# Patient Record
Sex: MALE | Race: ASIAN | NOT HISPANIC OR LATINO | Employment: FULL TIME | URBAN - METROPOLITAN AREA
[De-identification: names, ages, dates, MRNs, and addresses within clinical notes are randomized per-mention and may not be internally consistent; named-entity substitution may affect disease eponyms.]

---

## 2021-08-11 ENCOUNTER — APPOINTMENT (OUTPATIENT)
Dept: RADIOLOGY | Facility: HOSPITAL | Age: 36
End: 2021-08-11
Attending: STUDENT IN AN ORGANIZED HEALTH CARE EDUCATION/TRAINING PROGRAM
Payer: COMMERCIAL

## 2021-08-11 ENCOUNTER — HOSPITAL ENCOUNTER (EMERGENCY)
Facility: HOSPITAL | Age: 36
Discharge: HOME | End: 2021-08-11
Attending: EMERGENCY MEDICINE | Admitting: EMERGENCY MEDICINE
Payer: COMMERCIAL

## 2021-08-11 VITALS
OXYGEN SATURATION: 96 % | TEMPERATURE: 97.6 F | SYSTOLIC BLOOD PRESSURE: 125 MMHG | DIASTOLIC BLOOD PRESSURE: 80 MMHG | RESPIRATION RATE: 18 BRPM | HEART RATE: 100 BPM | WEIGHT: 180 LBS

## 2021-08-11 DIAGNOSIS — K27.9 PUD (PEPTIC ULCER DISEASE): ICD-10-CM

## 2021-08-11 DIAGNOSIS — R10.11 RUQ ABDOMINAL PAIN: ICD-10-CM

## 2021-08-11 LAB
ALBUMIN SERPL-MCNC: 4 G/DL (ref 3.5–5)
ALP SERPL-CCNC: 67 U/L (ref 45–120)
ALT SERPL W P-5'-P-CCNC: 36 U/L (ref 0–45)
ANION GAP SERPL CALCULATED.3IONS-SCNC: 6 MMOL/L (ref 5–18)
AST SERPL W P-5'-P-CCNC: 22 U/L (ref 0–40)
BASOPHILS # BLD AUTO: 0.1 10E3/UL (ref 0–0.2)
BASOPHILS NFR BLD AUTO: 1 %
BILIRUB SERPL-MCNC: 0.6 MG/DL (ref 0–1)
BUN SERPL-MCNC: 10 MG/DL (ref 8–22)
CALCIUM SERPL-MCNC: 9.2 MG/DL (ref 8.5–10.5)
CHLORIDE BLD-SCNC: 106 MMOL/L (ref 98–107)
CO2 SERPL-SCNC: 28 MMOL/L (ref 22–31)
CREAT SERPL-MCNC: 0.83 MG/DL (ref 0.7–1.3)
EOSINOPHIL # BLD AUTO: 0.1 10E3/UL (ref 0–0.7)
EOSINOPHIL NFR BLD AUTO: 1 %
ERYTHROCYTE [DISTWIDTH] IN BLOOD BY AUTOMATED COUNT: 12.6 % (ref 10–15)
GFR SERPL CREATININE-BSD FRML MDRD: >90 ML/MIN/1.73M2
GLUCOSE BLD-MCNC: 97 MG/DL (ref 70–125)
HCT VFR BLD AUTO: 46.4 % (ref 40–53)
HGB BLD-MCNC: 15.6 G/DL (ref 13.3–17.7)
IMM GRANULOCYTES # BLD: 0 10E3/UL
IMM GRANULOCYTES NFR BLD: 1 %
LYMPHOCYTES # BLD AUTO: 1.9 10E3/UL (ref 0.8–5.3)
LYMPHOCYTES NFR BLD AUTO: 26 %
MCH RBC QN AUTO: 30 PG (ref 26.5–33)
MCHC RBC AUTO-ENTMCNC: 33.6 G/DL (ref 31.5–36.5)
MCV RBC AUTO: 89 FL (ref 78–100)
MONOCYTES # BLD AUTO: 0.6 10E3/UL (ref 0–1.3)
MONOCYTES NFR BLD AUTO: 9 %
NEUTROPHILS # BLD AUTO: 4.6 10E3/UL (ref 1.6–8.3)
NEUTROPHILS NFR BLD AUTO: 62 %
NRBC # BLD AUTO: 0 10E3/UL
NRBC BLD AUTO-RTO: 0 /100
PLATELET # BLD AUTO: 262 10E3/UL (ref 150–450)
POTASSIUM BLD-SCNC: 3.8 MMOL/L (ref 3.5–5)
PROT SERPL-MCNC: 7.2 G/DL (ref 6–8)
RBC # BLD AUTO: 5.2 10E6/UL (ref 4.4–5.9)
SODIUM SERPL-SCNC: 140 MMOL/L (ref 136–145)
WBC # BLD AUTO: 7.2 10E3/UL (ref 4–11)

## 2021-08-11 PROCEDURE — 71046 X-RAY EXAM CHEST 2 VIEWS: CPT

## 2021-08-11 PROCEDURE — 85025 COMPLETE CBC W/AUTO DIFF WBC: CPT | Performed by: EMERGENCY MEDICINE

## 2021-08-11 PROCEDURE — 250N000009 HC RX 250: Performed by: EMERGENCY MEDICINE

## 2021-08-11 PROCEDURE — 82247 BILIRUBIN TOTAL: CPT | Performed by: EMERGENCY MEDICINE

## 2021-08-11 PROCEDURE — 99284 EMERGENCY DEPT VISIT MOD MDM: CPT | Mod: 25

## 2021-08-11 PROCEDURE — 36415 COLL VENOUS BLD VENIPUNCTURE: CPT | Performed by: EMERGENCY MEDICINE

## 2021-08-11 PROCEDURE — 250N000013 HC RX MED GY IP 250 OP 250 PS 637: Performed by: EMERGENCY MEDICINE

## 2021-08-11 PROCEDURE — 82040 ASSAY OF SERUM ALBUMIN: CPT | Performed by: EMERGENCY MEDICINE

## 2021-08-11 RX ADMIN — LIDOCAINE HYDROCHLORIDE 30 ML: 20 SOLUTION ORAL; TOPICAL at 15:52

## 2021-08-11 ASSESSMENT — ENCOUNTER SYMPTOMS
BLOOD IN STOOL: 0
DIARRHEA: 0
CHILLS: 0
DIZZINESS: 0
VOMITING: 0
CONFUSION: 0
ABDOMINAL PAIN: 0
SORE THROAT: 0
HEMATURIA: 0
COUGH: 0
DYSURIA: 0
JOINT SWELLING: 0
DIFFICULTY URINATING: 0
NAUSEA: 0
FEVER: 0
SHORTNESS OF BREATH: 0

## 2021-08-11 NOTE — ED PROVIDER NOTES
Emergency Department Encounter     Evaluation Date & Time:   8/11/2021  3:17 PM    CHIEF COMPLAINT:  Rib Pain      Triage Note:R sided anterior lower rib pain x3-4 years, acutely worse and constant over the past 3 weeks. Denies cough, denies CP, Denies trauma, denies SOB, Denies fevers, denies NVD. No other complaints.         ED COURSE & MEDICAL DECISION MAKING:     ED Course as of Aug 11 1707   Wed Aug 11, 2021   1550 CXR (independent interpretation): no acute cardiopulmonary process       1559 CBC unremarkable.      1616 CMP unremarkable. Will speak with pt, reiterate PPI, [facility] follow up. Pt remains quite well here, appropriate for discharge, conservative empiric therapy.            3:36 PM I met with patient to gather history and perform initial exam. ED course and treatment plan was discussed.  Pt here with ongoing, intermittent RUQ/epigastric pain for the past 3 years, occuring 2-3 times a month, but now daily for the past 2-3 weeks. This is in the setting of recent ibuprofen use for left upper back pain (that has now resolved for few days).  Pt states abd pain is worse with an empty stomach, improves with eating.  Abdomen entirely benign, no associated other concerning symptoms and no bleeding/melena and well appearing.  Exam/history consistent with likely PUD.  Will get cmp, cbc, but no indication for imaging.  Will give a gi cocktail, anticipate discharge with PPI and [facility] follow up.  Pt agreeable.    4:20 PM I updated the patient with results and discussed plans for discharge. Patient was agreeable with the plan.    At the conclusion of the encounter I discussed the results of all the tests and the disposition. The questions were answered. The patient or family acknowledged understanding and was agreeable with the care plan.      MEDICATIONS GIVEN IN THE EMERGENCY DEPARTMENT:  Medications   lidocaine (XYLOCAINE) 2 % 15 mL, alum & mag hydroxide-simethicone (MAALOX) 15 mL GI Cocktail (30 mLs Oral Given  "8/11/21 1552)       NEW PRESCRIPTIONS STARTED AT TODAY'S ED VISIT:  Discharge Medication List as of 8/11/2021  4:26 PM      START taking these medications    Details   omeprazole (PRILOSEC) 20 MG DR capsule Take 1 capsule (20 mg) by mouth daily for 21 days, Disp-21 capsule, R-0, E-Prescribe             HPI   HPI     Jeromy Valdivia is a 35 year old male with no pertinent history who presents to this ED via walk in for evaluation of right rib pain.    Patient reports developing worsening \"constant\" right sided rib pain for 3 weeks that has been ongoing for 3 years with episodes that occur 1-2x a month. Also endorses constipation. Pain has no radiation. Says that eating and moving are palliating factors. Not eating provokes his pain. Has not seen any other provider. Notes he has been intaking more Ibuprofen due to his back pain with about 3 tablets each day. Denies nausea, vomiting, diarrhea, chest pain, shortness of breath, cough, difficulty urinating, black/bloody stools. No other complaints at this time.     REVIEW OF SYSTEMS:  Review of Systems   Constitutional: Negative for chills and fever.   HENT: Negative for sore throat.    Eyes: Negative for visual disturbance.   Respiratory: Negative for cough and shortness of breath.    Cardiovascular: Negative for chest pain.   Gastrointestinal: Negative for abdominal pain, blood in stool, diarrhea, nausea and vomiting.   Endocrine: Negative for polyuria.   Genitourinary: Negative for difficulty urinating, dysuria and hematuria.        - urinary changes     Musculoskeletal: Negative for joint swelling.        Positive for right rib pain.   Skin: Negative for rash.   Neurological: Negative for dizziness.   Psychiatric/Behavioral: Negative for confusion.   All other systems reviewed and are negative.          Medical History   No past medical history on file.    No past surgical history on file.    No family history on file.    Social History     Tobacco Use     Smoking status: " Not on file   Substance Use Topics     Alcohol use: Not on file     Drug use: Not on file       omeprazole (PRILOSEC) 20 MG DR capsule        Physical Exam     Triage Vitals:  ED Triage Vitals [08/11/21 1459]   Enc Vitals Group      /80      Pulse 100      Resp 18      Temp 97.6  F (36.4  C)      Temp src Temporal      SpO2 96 %      Weight 81.6 kg (180 lb)      Height       Head Circumference       Peak Flow       Pain Score       Pain Loc       Pain Edu?       Excl. in GC?         Vitals:  /80   Pulse 100   Temp 97.6  F (36.4  C) (Temporal)   Resp 18   Wt 81.6 kg (180 lb)   SpO2 96%     PHYSICAL EXAM:   Physical Exam  Vitals and nursing note reviewed.   Constitutional:       General: He is not in acute distress.     Appearance: Normal appearance.   HENT:      Head: Normocephalic and atraumatic.      Nose: Nose normal.      Mouth/Throat:      Mouth: Mucous membranes are moist.   Eyes:      Pupils: Pupils are equal, round, and reactive to light.   Cardiovascular:      Rate and Rhythm: Normal rate and regular rhythm.      Pulses: Normal pulses.           Radial pulses are 2+ on the right side and 2+ on the left side.        Dorsalis pedis pulses are 2+ on the right side and 2+ on the left side.   Pulmonary:      Effort: Pulmonary effort is normal. No respiratory distress.      Breath sounds: Normal breath sounds.   Chest:      Chest wall: No tenderness.   Abdominal:      General: There is no distension.      Palpations: Abdomen is soft.      Tenderness: There is no abdominal tenderness. Negative signs include Jacobo's sign and McBurney's sign.      Comments: No rigidity   Musculoskeletal:      Cervical back: Full passive range of motion without pain and neck supple.      Comments: No calf tenderness or swelling b/l   Skin:     General: Skin is warm.      Findings: No rash.   Neurological:      General: No focal deficit present.      Mental Status: He is alert. Mental status is at baseline.       Comments: Fluent speech, no acute lateralizing deficits   Psychiatric:         Mood and Affect: Mood normal.         Behavior: Behavior normal.           Results     LAB:  All pertinent labs reviewed and interpreted  Labs Ordered and Resulted from Time of ED Arrival Up to the Time of Departure from the ED   COMPREHENSIVE METABOLIC PANEL - Normal   CBC WITH PLATELETS & DIFFERENTIAL    Narrative:     The following orders were created for panel order CBC with platelets + differential.  Procedure                               Abnormality         Status                     ---------                               -----------         ------                     CBC with platelets and d...[[id number]]                      Final result                 Please view results for these tests on the individual orders.   CBC WITH PLATELETS AND DIFFERENTIAL       RADIOLOGY:  Chest XR,  PA & LAT   Final Result   IMPRESSION: No pleural fluid or pneumothorax. No airspace disease or edema. Normal size of the heart. No displaced fractures identified. CT or rib radiographs would be more sensitive.                    ECG:  none    PROCEDURES:  Procedures:  None            FINAL IMPRESSION:    ICD-10-CM    1. RUQ abdominal pain  R10.11    2. PUD (peptic ulcer disease)  K27.9     suspect       0 minutes of critical care time      I, Zakia Romero, am serving as a scribe to document services personally performed by Dr. Sammy Howard, based on my observations and the provider's statements to me. I, Sammy Howard, DO attest that Zakia Romero is acting in a scribe capacity, has observed my performance of the services and has documented them in accordance with my direction.      Sammy Howard DO  Emergency Medicine  [facility] EMERGENCY DEPARTMENT  8/11/2021  3:22 PM        Sammy Howard MD  08/11/21 1393

## 2021-08-11 NOTE — ED TRIAGE NOTES
R sided anterior lower rib pain x3-4 years, acutely worse and constant over the past 3 weeks. Denies cough, denies CP, Denies trauma, denies SOB, Denies fevers, denies NVD. No other complaints.

## 2021-08-11 NOTE — DISCHARGE INSTRUCTIONS
Take prilosec daily for the next 2 weeks at least and call GI doctor for follow up and further evaluation/care.  Prilosec needs to be taken every day to be effective and can take 2-3 days to start working.  For the first 2-3 days, you can try over the counter pepcid 20mg twice a day for those first 2-3 days.

## 2021-08-22 ENCOUNTER — HEALTH MAINTENANCE LETTER (OUTPATIENT)
Age: 36
End: 2021-08-22

## 2021-10-17 ENCOUNTER — HEALTH MAINTENANCE LETTER (OUTPATIENT)
Age: 36
End: 2021-10-17

## 2021-10-22 ENCOUNTER — HOSPITAL ENCOUNTER (OUTPATIENT)
Facility: HOSPITAL | Age: 36
Setting detail: OBSERVATION
Discharge: HOME HEALTH | End: 2021-10-24
Attending: EMERGENCY MEDICINE | Admitting: HOSPITALIST
Payer: COMMERCIAL

## 2021-10-22 ENCOUNTER — APPOINTMENT (OUTPATIENT)
Dept: MRI IMAGING | Facility: HOSPITAL | Age: 36
End: 2021-10-22
Payer: COMMERCIAL

## 2021-10-22 DIAGNOSIS — M54.50 ACUTE MIDLINE LOW BACK PAIN WITHOUT SCIATICA: Primary | ICD-10-CM

## 2021-10-22 DIAGNOSIS — R10.13 DYSPEPSIA: ICD-10-CM

## 2021-10-22 LAB — SARS-COV-2 RNA RESP QL NAA+PROBE: NEGATIVE

## 2021-10-22 PROCEDURE — 99220 PR INITIAL OBSERVATION CARE,LEVEL III: CPT | Performed by: HOSPITALIST

## 2021-10-22 PROCEDURE — 99285 EMERGENCY DEPT VISIT HI MDM: CPT | Mod: 25

## 2021-10-22 PROCEDURE — 250N000011 HC RX IP 250 OP 636: Performed by: PHYSICIAN ASSISTANT

## 2021-10-22 PROCEDURE — 72148 MRI LUMBAR SPINE W/O DYE: CPT

## 2021-10-22 PROCEDURE — 250N000011 HC RX IP 250 OP 636: Performed by: HOSPITALIST

## 2021-10-22 PROCEDURE — 87635 SARS-COV-2 COVID-19 AMP PRB: CPT | Performed by: EMERGENCY MEDICINE

## 2021-10-22 PROCEDURE — G0378 HOSPITAL OBSERVATION PER HR: HCPCS

## 2021-10-22 PROCEDURE — 96376 TX/PRO/DX INJ SAME DRUG ADON: CPT

## 2021-10-22 PROCEDURE — 96375 TX/PRO/DX INJ NEW DRUG ADDON: CPT

## 2021-10-22 PROCEDURE — 96374 THER/PROPH/DIAG INJ IV PUSH: CPT

## 2021-10-22 PROCEDURE — C9803 HOPD COVID-19 SPEC COLLECT: HCPCS

## 2021-10-22 PROCEDURE — 250N000011 HC RX IP 250 OP 636: Performed by: EMERGENCY MEDICINE

## 2021-10-22 PROCEDURE — 250N000013 HC RX MED GY IP 250 OP 250 PS 637: Performed by: HOSPITALIST

## 2021-10-22 RX ORDER — NALOXONE HYDROCHLORIDE 0.4 MG/ML
0.2 INJECTION, SOLUTION INTRAMUSCULAR; INTRAVENOUS; SUBCUTANEOUS
Status: DISCONTINUED | OUTPATIENT
Start: 2021-10-22 | End: 2021-10-24 | Stop reason: HOSPADM

## 2021-10-22 RX ORDER — ONDANSETRON 4 MG/1
4 TABLET, ORALLY DISINTEGRATING ORAL EVERY 6 HOURS PRN
Status: DISCONTINUED | OUTPATIENT
Start: 2021-10-22 | End: 2021-10-24 | Stop reason: HOSPADM

## 2021-10-22 RX ORDER — ORPHENADRINE CITRATE 30 MG/ML
60 INJECTION INTRAMUSCULAR; INTRAVENOUS ONCE
Status: COMPLETED | OUTPATIENT
Start: 2021-10-22 | End: 2021-10-22

## 2021-10-22 RX ORDER — AMOXICILLIN 250 MG
2 CAPSULE ORAL 2 TIMES DAILY PRN
Status: DISCONTINUED | OUTPATIENT
Start: 2021-10-22 | End: 2021-10-23

## 2021-10-22 RX ORDER — METHOCARBAMOL 500 MG/1
500 TABLET, FILM COATED ORAL 4 TIMES DAILY
Status: DISCONTINUED | OUTPATIENT
Start: 2021-10-22 | End: 2021-10-24 | Stop reason: HOSPADM

## 2021-10-22 RX ORDER — NALOXONE HYDROCHLORIDE 0.4 MG/ML
0.4 INJECTION, SOLUTION INTRAMUSCULAR; INTRAVENOUS; SUBCUTANEOUS
Status: DISCONTINUED | OUTPATIENT
Start: 2021-10-22 | End: 2021-10-24 | Stop reason: HOSPADM

## 2021-10-22 RX ORDER — ONDANSETRON 2 MG/ML
4 INJECTION INTRAMUSCULAR; INTRAVENOUS EVERY 6 HOURS PRN
Status: DISCONTINUED | OUTPATIENT
Start: 2021-10-22 | End: 2021-10-24 | Stop reason: HOSPADM

## 2021-10-22 RX ORDER — KETOROLAC TROMETHAMINE 30 MG/ML
30 INJECTION, SOLUTION INTRAMUSCULAR; INTRAVENOUS EVERY 6 HOURS PRN
Status: DISCONTINUED | OUTPATIENT
Start: 2021-10-22 | End: 2021-10-24 | Stop reason: HOSPADM

## 2021-10-22 RX ORDER — AMOXICILLIN 250 MG
1 CAPSULE ORAL 2 TIMES DAILY PRN
Status: DISCONTINUED | OUTPATIENT
Start: 2021-10-22 | End: 2021-10-23

## 2021-10-22 RX ORDER — ACETAMINOPHEN 325 MG/1
975 TABLET ORAL EVERY 8 HOURS
Status: DISCONTINUED | OUTPATIENT
Start: 2021-10-22 | End: 2021-10-24 | Stop reason: HOSPADM

## 2021-10-22 RX ORDER — ALLOPURINOL 100 MG/1
100 TABLET ORAL DAILY
COMMUNITY
Start: 2020-11-19 | End: 2022-01-27

## 2021-10-22 RX ORDER — DIAZEPAM 10 MG/2ML
5 INJECTION, SOLUTION INTRAMUSCULAR; INTRAVENOUS ONCE
Status: COMPLETED | OUTPATIENT
Start: 2021-10-22 | End: 2021-10-22

## 2021-10-22 RX ORDER — HYDROMORPHONE HYDROCHLORIDE 2 MG/1
2 TABLET ORAL EVERY 4 HOURS
Status: DISCONTINUED | OUTPATIENT
Start: 2021-10-22 | End: 2021-10-22

## 2021-10-22 RX ORDER — BACLOFEN 10 MG/1
10 TABLET ORAL 2 TIMES DAILY PRN
Status: ON HOLD | COMMUNITY
Start: 2020-07-16 | End: 2021-10-24

## 2021-10-22 RX ORDER — KETOROLAC TROMETHAMINE 15 MG/ML
15 INJECTION, SOLUTION INTRAMUSCULAR; INTRAVENOUS ONCE
Status: COMPLETED | OUTPATIENT
Start: 2021-10-22 | End: 2021-10-22

## 2021-10-22 RX ADMIN — HYDROMORPHONE HYDROCHLORIDE 1 MG: 1 INJECTION, SOLUTION INTRAMUSCULAR; INTRAVENOUS; SUBCUTANEOUS at 20:31

## 2021-10-22 RX ADMIN — ORPHENADRINE CITRATE 60 MG: 30 INJECTION INTRAMUSCULAR; INTRAVENOUS at 13:40

## 2021-10-22 RX ADMIN — HYDROMORPHONE HYDROCHLORIDE 1 MG: 1 INJECTION, SOLUTION INTRAMUSCULAR; INTRAVENOUS; SUBCUTANEOUS at 13:05

## 2021-10-22 RX ADMIN — HYDROMORPHONE HYDROCHLORIDE 1 MG: 1 INJECTION, SOLUTION INTRAMUSCULAR; INTRAVENOUS; SUBCUTANEOUS at 15:37

## 2021-10-22 RX ADMIN — KETOROLAC TROMETHAMINE 15 MG: 15 INJECTION, SOLUTION INTRAMUSCULAR; INTRAVENOUS at 16:45

## 2021-10-22 RX ADMIN — HYDROMORPHONE HYDROCHLORIDE 1 MG: 1 INJECTION, SOLUTION INTRAMUSCULAR; INTRAVENOUS; SUBCUTANEOUS at 13:40

## 2021-10-22 RX ADMIN — ACETAMINOPHEN 975 MG: 325 TABLET ORAL at 20:32

## 2021-10-22 RX ADMIN — METHOCARBAMOL 500 MG: 500 TABLET, FILM COATED ORAL at 22:46

## 2021-10-22 RX ADMIN — DIAZEPAM 5 MG: 5 INJECTION, SOLUTION INTRAMUSCULAR; INTRAVENOUS at 15:38

## 2021-10-22 ASSESSMENT — ENCOUNTER SYMPTOMS
CHILLS: 0
ABDOMINAL PAIN: 0
WEAKNESS: 0
DIARRHEA: 0
VOMITING: 0
FEVER: 0
DIAPHORESIS: 0
NAUSEA: 0
NUMBNESS: 0
BACK PAIN: 1

## 2021-10-22 ASSESSMENT — MIFFLIN-ST. JEOR: SCORE: 1620.72

## 2021-10-22 ASSESSMENT — ACTIVITIES OF DAILY LIVING (ADL): DEPENDENT_IADLS:: INDEPENDENT

## 2021-10-22 NOTE — ED PROVIDER NOTES
EMERGENCY DEPARTMENT ENCOUNTER      NAME: Jeromy Valdivia  AGE: 35 year old male  YOB: 1985  MRN: [id number]  EVALUATION DATE & TIME: 10/22/2021 12:24 PM    PCP: System, Provider Not In    ED PROVIDER: Liliana Contreras PA-C      Chief Complaint   Patient presents with     Back Pain       FINAL IMPRESSION:  1. Acute midline low back pain without sciatica          MEDICAL DECISION MAKING:    Pertinent Labs & Imaging studies reviewed. (See chart for details)  35 year old male presents to the Emergency Department for evaluation of atraumatic severe low back pain. Woke up with mild low back pain which acutely worsened after standing up from toilet. Pain intermittent with contraction like muscle spasms that causes him to not be able to walk. He denies fevers, chills, night sweats, saddle anesthesia, bowel/bladder dysfunction, leg weakness or numbness. Pain does not radiate into his legs. He notes tingling in his hands and feet when pain is severe.     Exam reveals uncomfortable appearing male.  Vital signs unremarkable. He has 5 out of 5 strength of upper and lower extremities, bilaterally. Distal sensation intact. No reproducible midline or paraspinal tenderness though grabbing at his lower back. No overlying skin changes. Differential diagnosis includes muscle spasm/strain, slipped disc cauda equina syndrome, vertebral fracture, vertebral tumor, epidural abscess / discitis.    Acute, severe low back pain. The patient appears nontoxic with stable vital signs. Overall, the patient has a benign physical exam with no focal neuro deficits.  He denies any saddle anesthesia, bowel or bladder incontinence, history of immunocompromise, fever or night sweats, spinal instrumentation, or other red flags.  He has no bony tenderness or recent falls or trauma.  Given severity of pain despite pain medication and muscle relaxants, will proceed with MRI lumbar spine. Patient signed out to Dr. Early at scheduled shift change pending  "imaging study.    0 minutes of critical care time     ED COURSE:  12:35 PM I met with the patient, obtained history, performed an initial exam, and discussed options and plan for diagnostics and treatment here in the ED.  2:23 PM Patient signed out to Dr. Early pending MR lumbar spine.     The questions were answered. The patient and family acknowledged understanding and were agreeable with the care plan.     PPE: Provider wore gloves, N95 mask, eye protection, surgical cap, and paper mask.     MEDICATIONS GIVEN IN THE EMERGENCY:  Medications   HYDROmorphone (DILAUDID) injection 1 mg (1 mg Intravenous Given 10/22/21 1305)   HYDROmorphone (DILAUDID) injection 1 mg (1 mg Intravenous Given 10/22/21 1340)   orphenadrine (NORFLEX) injection 60 mg (60 mg Intravenous Given 10/22/21 1340)       NEW PRESCRIPTIONS STARTED AT TODAY'S ER VISIT  New Prescriptions    No medications on file     =================================================================    HPI:    Patient information was obtained from: Patient    Use of Interpretor: N/A    Jeromy Valdivia is a 35 year old male with no pertinent medical history who presents to this ED via walk-in for evaluation of back pain.     The patient reports waking up with localized mild lower back pain this morning. Patient went to work where his pain began to increase. Patient used the bathroom to have a bowel movement and once he got up off of the toilet, he felt a sharp spasm in the center of his lower back that releases and contracts intermittently. He notes that any movement can cause the spasm. He rates his pain \"off the chart\". Also notes some tingling in his legs and hands. Patient took a muscle relaxant prior to arrival. Patient denies fever, chills, diaphoresis, abdominal pain, numbness, weakness, bowel or bladder dysfunction, saddle anesthesia, and any other symptoms or complaints at this time.    REVIEW OF SYSTEMS:  Review of Systems   Constitutional: Negative for chills, " diaphoresis and fever.   Gastrointestinal: Negative for abdominal pain, diarrhea, nausea and vomiting.   Musculoskeletal: Positive for back pain (mid lower back) and gait problem (secondary to back pain).   Skin: Negative for rash.   Allergic/Immunologic: Negative for immunocompromised state.   Neurological: Negative for weakness and numbness.        Positive for tingling (bilateral legs, bilateral hands). Negative for bowel or bladder dysfunction   All other systems reviewed and are negative.      PAST MEDICAL HISTORY:  No past medical history on file.    PAST SURGICAL HISTORY:  No past surgical history on file.    CURRENT MEDICATIONS:    No current facility-administered medications for this encounter.  No current outpatient medications on file.    ALLERGIES:  Allergies   Allergen Reactions     Flagyl [Metronidazole] Hives     FAMILY HISTORY:  No family history on file.    SOCIAL HISTORY:   Social History     Socioeconomic History     Marital status: Single     Spouse name: Not on file     Number of children: Not on file     Years of education: Not on file     Highest education level: Not on file   Occupational History     Not on file   Tobacco Use     Smoking status: Not on file   Substance and Sexual Activity     Alcohol use: Not on file     Drug use: Not on file     Sexual activity: Not on file   Other Topics Concern     Not on file   Social History Narrative     Not on file     Social Determinants of Health     Financial Resource Strain:      Difficulty of Paying Living Expenses:    Food Insecurity:      Worried About Running Out of Food in the Last Year:      Ran Out of Food in the Last Year:    Transportation Needs:      Lack of Transportation (Medical):      Lack of Transportation (Non-Medical):    Physical Activity:      Days of Exercise per Week:      Minutes of Exercise per Session:    Stress:      Feeling of Stress :    Social Connections:      Frequency of Communication with Friends and Family:       "Frequency of Social Gatherings with Friends and Family:      Attends Muslim Services:      Active Member of Clubs or Organizations:      Attends Club or Organization Meetings:      Marital Status:    Intimate Partner Violence:      Fear of Current or Ex-Partner:      Emotionally Abused:      Physically Abused:      Sexually Abused:        VITALS:  Patient Vitals for the past 24 hrs:   BP Temp Temp src Pulse Resp SpO2 Height Weight   10/22/21 1300 110/70 -- -- 92 16 98 % -- --   10/22/21 1137 121/73 98.3  F (36.8  C) Oral 89 20 99 % 1.575 m (5' 2\") 81.6 kg (180 lb)       PHYSICAL EXAM   Constitutional: Well developed, Obese. Uncomfortable appearing.   HENT: Normocephalic, Atraumatic, mucous membranes moist  Neck-  Normal range of motion, No tenderness, Supple, No stridor.   Eyes: PERRL, Conjunctiva normal, No discharge.   Respiratory: Normal breath sounds, No respiratory distress, No wheezing, Speaks full sentences easily. No cough.  Cardiovascular: Normal heart rate, Regular rhythm, No murmurs, No rubs, No gallops. Chest wall nontender.  GI: Soft, No tenderness, No masses, No flank tenderness. No rebound or guarding.  Musculoskeletal: 2+ DP pulses bilaterally. No edema. No cyanosis, No clubbing. Good range of motion in all major joints. Lumbar flexion and extension WNL. Straight leg raise negative bilaterally. No midline or paraspinal tenderness. No overlying skin changes. 5/5 strength for the bilateral hip flexors, knee flexors/extensors, ankle dorsiflexors/plantar flexors, great toe extensors, ankle evertors/invertors.  Integument: Warm, Dry, No erythema, No rash. No petechiae.  Neurologic: Patient is alert and oriented ×3. Face is symmetric. Speech is normal. Strength is full and equal in both upper and lower extremities. Sensory is intact. Patellar and achilles reflexes are 2+ and symmetric. No ankle clonus bilaterally.  Psychiatric: Affect normal, Judgment normal, Mood normal. " Cooperative.    RADIOLOGY:  Reviewed all pertinent imaging. Please see official radiology report.  Lumbar spine MRI w/o contrast    (Results Pending)       I, Deana Chaparro, am serving as a scribe to document services personally performed by Liliana Contreras PA-C based on my observation and the provider's statements to me. I, Liliana Contreras PA-C attest that Deana Chaparro is acting in a scribe capacity, has observed my performance of the services and has documented them in accordance with my direction.    Liliana Contreras PA-C  Emergency Medicine  [facility]  10/22/2021      Liliana Contreras PA-C  10/22/21 1722

## 2021-10-22 NOTE — H&P
.  [facility]    History and Physical - Hospitalist Service       Date of Admission:  10/22/2021    Assessment & Plan      Jeromy Valdivia is a 35 year old male admitted on 10/22/2021. He has no history of PMH and comes in with back pain    Acute back pain - unclear etiology   -Sudden onset of back pain today, no injuries reported.  -MRI no acute findings.  -Check UA ?  -Start scheduled tylenol 1000mg TID, IV dilaudid 1 mg Q4H  -Toradol 30mg X3 doses and robaxin ordered  -PT/OT consulted      Diet: Regular Diet AdultRegular diet  DVT Prophylaxis: Ambulate every shift  Downing Catheter: Not present  Central Lines: None  Code Status: Full CodeFull code    Disposition Plan   Expected discharge: <2 midnight for evaluation of back pain    The patient's care was discussed with the Bedside Nurse and Patient.    Lisa Gunn MD  [facility]  ______________________________________________________________________    Chief Complaint   Acute back pain    History is obtained from the patient    History of Present Illness   Jeromy Valdivia is a 35 year old male with no past medical history presented with back pain that started today.  Patient reports pain is in the lower back, rates 10 on 10 in severity.  Describes it as dull aching.  Associated with severe muscle spasms.  It is nonradiating.  Patient denies any fever, chest pain, shortness of breath, abdominal pain, nausea, vomiting, dysuria or polyuria, diarrhea or constipation.  No bowel or bladder incontinence.  No weakness, tingling or numbness.  No recent travel or sick contact.  No known exposures to Covid.  No history of any injuries to back.    In ER, patient appears hemodynamically stable.  Labs are all unremarkable.  MRI of the lumbar spine shows no acute findings to suggest the etiology for the back pain.  Patient's pain is uncontrollable and hence will be admitted on observation overnight for pain control.    Review of Systems     The 10 point Review of Systems is negative other than noted in the HPI or here.     Past Medical History    I have reviewed this patient's medical history and updated it with pertinent information if needed.   No past medical history on file.    Past Surgical History   I have reviewed this patient's surgical history and updated it with pertinent information if needed.  No past surgical history on file.    Social History   I have reviewed this patient's social history and updated it with pertinent information if needed.  Social History     Tobacco Use     Smoking status: Not on file   Substance Use Topics     Alcohol use: Not on file     Drug use: Not on file       Family History   Reviewed and non contributory    Prior to Admission Medications   Prior to Admission Medications   Prescriptions Last Dose Informant Patient Reported? Taking?   allopurinol (ZYLOPRIM) 100 MG tablet 10/21/2021 at Unknown time  Yes Yes   Sig: Take 100 mg by mouth daily   baclofen (LIORESAL) 10 MG tablet 10/22/2021 at am  Yes Yes   Sig: Take 10 mg by mouth 2 times daily as needed      Facility-Administered Medications: None     Allergies   Allergies   Allergen Reactions     Flagyl [Metronidazole] Hives       Physical Exam   Vital Signs: Temp: 98.4  F (36.9  C) Temp src: Oral BP: 120/73 Pulse: 80   Resp: 16 SpO2: 98 % O2 Device: Nasal cannula Oxygen Delivery: 3 LPM  Weight: 180 lbs 0 oz  General:  Appears stated age, no acute distress. A&O x 3.  Skin:  Warm, dry. No rashes or lesions on exposed skin.  HEENT:  Normocephalic, atraumatic; EOMs grossly intact.  Neck:  Supple.  Chest:  Breath sounds CTA and no increased work of breathing on room air.  Cardiovascular:  RRR. No peripheral edema.  Abdomen:  Soft, non-tender, non-distended.  Musculoskeletal:  Moves all four extremities. No muscle atrophy. Paraspinal muscle tenderness+.   Neurological:  CN 2-12 grossly intact.    Data   Data reviewed today: I reviewed all medications, new labs and  imaging results over the last 24 hours. I personally reviewed labs and MRI    No lab results found in last 7 days.    Imaging:  Recent Results (from the past 24 hour(s))   Lumbar spine MRI w/o contrast    Narrative    EXAM: MR LUMBAR SPINE W/O CONTRAST  LOCATION: [facility]  DATE/TIME: 10/22/2021 2:22 PM    INDICATION: Severe low back pain.  COMPARISON: None.  TECHNIQUE: Routine Lumbar Spine MRI without IV contrast.    FINDINGS:   Nomenclature is based on five lumbar-type vertebral bodies.    Vertebral body heights are unremarkable. There is no evidence of a marrow-replacing process. There is no evidence of abnormal bony edema. The imaged portions of the proximal femora, sacrum and gabe appear intact.    There is mild dextroconvex curvature. Lumbar lordosis is unremarkable.    The conus terminates at L1. No signal abnormalities of the visualized cord or cauda equina nerve roots are demonstrated.    The paraspinal soft tissues are unremarkable.    Limited images of the abdominal cavity demonstrate no acute abnormality. There is no evidence of abdominal aortic aneurysm.    T12-L1: Normal disc height and signal. No herniation. Normal facets. No spinal canal or neural foraminal stenosis.     L1-L2: Normal disc height and signal. No herniation. Normal facets. No spinal canal or neural foraminal stenosis.    L2-L3: Normal disc height and signal. No herniation. Normal facets. No spinal canal or neural foraminal stenosis.     L3-L4: Mild intervertebral disc height loss with disc desiccation. Shallow bulge and right paracentral protrusion. No significant spinal canal stenosis. Mild right lateral recess effacement. Mild facet arthropathy. No significant neural foraminal   stenosis.    L4-L5: Mild intervertebral disc height loss with disc desiccation. Shallow bulge and central/left paracentral extrusion. Mild facet arthropathy. No significant spinal canal stenosis. No significant neural foraminal  stenosis.    L5-S1: Mild intervertebral disc height loss with disc desiccation. Broad-based disc bulging with right-sided protrusion abutting the descending S1 nerve root. No spinal canal stenosis or neural foraminal stenosis.      Impression    IMPRESSION:  1.  No findings to suggest acute fracture.  2.  No high-grade stenosis of the spinal canal or neural foramina demonstrated.  3.  At L5-S1, a right-sided protrusion contacts the descending right S1 nerve root.  4.  At L3-L4, there is mild right lateral recess stenosis.  5.  Additional mild lower lumbar spondylosis as above.

## 2021-10-22 NOTE — ED TRIAGE NOTES
Pt in recliner chair in [facility].  Reports not able to get up due to pain.  Reports woke up today with some pian in low back from chronic injury more than 1 yr ago.  States went to work and about 0900 went to have a BM and started having sharp pain in low back that radiates bilat.  Denies current injury.  Reports feels better when laying down.  Took baclofen and tylenol about 0830 today.

## 2021-10-22 NOTE — PHARMACY-ADMISSION MEDICATION HISTORY
Pharmacy Note - Admission Medication History    Pertinent Provider Information: None     ______________________________________________________________________    Prior To Admission (PTA) med list completed and updated in EMR.       PTA Med List   Medication Sig Last Dose     allopurinol (ZYLOPRIM) 100 MG tablet Take 100 mg by mouth daily 10/21/2021 at Unknown time     baclofen (LIORESAL) 10 MG tablet Take 10 mg by mouth 2 times daily as needed 10/22/2021 at am       Information source(s): Patient, Family member and CareEverywhere/SureScripts  Method of interview communication: in-person    Summary of Changes to PTA Med List  New: Both  Discontinued: None  Changed: None    Patient was asked about OTC/herbal products specifically.  PTA med list reflects this.    In the past week, patient estimated taking medication this percent of the time:  greater than 90%.    Allergies were reviewed, assessed, and updated with the patient.      Patient does not use any multi-dose medications prior to admission.    The information provided in this note is only as accurate as the sources available at the time of the update(s).    Thank you for the opportunity to participate in the care of this patient.    Alison Kwong [facility]  10/22/2021 6:09 PM

## 2021-10-22 NOTE — ED PROVIDER NOTES
"I am seeing this patient along with Liliana Contreras, PAC.    HPI:  Jeromy Valdivia is a 35 year old male who presents to this ED for the evaluation of back pain. The patient reports onset of localized mild lower back pain this morning when he woke up which grew worse after going to work. Patient had a sharp spasm in his central middle back after he got up from having a bowel movement. Pain persistent since onset and states it constricts and releases. He currently rates his pain as 9/10. Pain provoked by movement, particularly movement of his legs. Patient took muscle relaxer prior to arrival.    Denies dysuria, hematuria, urinary or bowel incontinence, chest pain, shortness of breath, cough, congestion, sore throat, fever, or any other symptoms at this time.    ROS:   Constitutional:  Denies fever, chills, weight loss or weakness  Eyes:  No pain, discharge, redness   HENT:  Denies sore throat, ear pain, congestion   Respiratory: No SOB, wheeze or cough  Cardiovascular:  No CP, palpitations  GI:  Denies abdominal pain, nausea, vomiting, diarrhea, incontinence  : Denies dysuria, hematuria, urinary incontinence  Musculoskeletal:  Positive for back pain (central lower, severe) Denies any new joint pain, swelling or loss of function.   Skin:  Denies rash, pallor   Neurologic:  Denies headache, focal weakness or sensory changes  Endocrine: Denies polyuria, denies difficulty with tolerating hot/cold temperatures  Lymph: Denies swollen nodes    All other systems negative unless noted in HPI.    Physical Exam:    VITAL SIGNS: /83   Pulse 89   Temp 98.3  F (36.8  C) (Oral)   Resp 16   Ht 1.575 m (5' 2\")   Wt 81.6 kg (180 lb)   SpO2 100%   BMI 32.92 kg/m      General Appearance: Well-appearing, well-nourished, no acute distress   Head:  Normocephalic, without obvious abnormality, atraumatic  Eyes:  PERRL, conjunctiva/corneas clear, EOM's intact,  ENT:  Lips, mucosa, and tongue normal, membranes are moist without " pallor  Neck:  Normal ROM, symmetrical, trachea midline    Cardio:  Regular rate and rhythm, no murmur, rub or gallop, 2+ pulses symmetric in all extremities  Pulm:  Clear to auscultation bilaterally, respirations unlabored,   Back:  Spasms with back pain with attempts to lift legs off of bed. ROM normal, no CVA tenderness, no spinal tenderness, no paraspinal tenderness   Abdomen:  Soft, non-tender, no rebound or guarding.  Musculoskeletal: Full ROM, no edema, no cyanosis, good ROM of major joints  Integument:  Warm, Dry, No erythema, No rash.    Neurologic:  Alert & oriented.  No focal deficits appreciated.  Ambulatory.  Psychiatric:  Affect normal, Judgment normal, Mood normal.      LABS  Results for orders placed or performed during the hospital encounter of 10/22/21 (from the past 24 hour(s))   Lumbar spine MRI w/o contrast    Narrative    EXAM: MR LUMBAR SPINE W/O CONTRAST  LOCATION: [facility]  DATE/TIME: 10/22/2021 2:22 PM    INDICATION: Severe low back pain.  COMPARISON: None.  TECHNIQUE: Routine Lumbar Spine MRI without IV contrast.    FINDINGS:   Nomenclature is based on five lumbar-type vertebral bodies.    Vertebral body heights are unremarkable. There is no evidence of a marrow-replacing process. There is no evidence of abnormal bony edema. The imaged portions of the proximal femora, sacrum and gabe appear intact.    There is mild dextroconvex curvature. Lumbar lordosis is unremarkable.    The conus terminates at L1. No signal abnormalities of the visualized cord or cauda equina nerve roots are demonstrated.    The paraspinal soft tissues are unremarkable.    Limited images of the abdominal cavity demonstrate no acute abnormality. There is no evidence of abdominal aortic aneurysm.    T12-L1: Normal disc height and signal. No herniation. Normal facets. No spinal canal or neural foraminal stenosis.     L1-L2: Normal disc height and signal. No herniation. Normal facets. No spinal  canal or neural foraminal stenosis.    L2-L3: Normal disc height and signal. No herniation. Normal facets. No spinal canal or neural foraminal stenosis.     L3-L4: Mild intervertebral disc height loss with disc desiccation. Shallow bulge and right paracentral protrusion. No significant spinal canal stenosis. Mild right lateral recess effacement. Mild facet arthropathy. No significant neural foraminal   stenosis.    L4-L5: Mild intervertebral disc height loss with disc desiccation. Shallow bulge and central/left paracentral extrusion. Mild facet arthropathy. No significant spinal canal stenosis. No significant neural foraminal stenosis.    L5-S1: Mild intervertebral disc height loss with disc desiccation. Broad-based disc bulging with right-sided protrusion abutting the descending S1 nerve root. No spinal canal stenosis or neural foraminal stenosis.      Impression    IMPRESSION:  1.  No findings to suggest acute fracture.  2.  No high-grade stenosis of the spinal canal or neural foramina demonstrated.  3.  At L5-S1, a right-sided protrusion contacts the descending right S1 nerve root.  4.  At L3-L4, there is mild right lateral recess stenosis.  5.  Additional mild lower lumbar spondylosis as above.         RADIOLOGY  Lumbar spine MRI w/o contrast   Final Result   IMPRESSION:   1.  No findings to suggest acute fracture.   2.  No high-grade stenosis of the spinal canal or neural foramina demonstrated.   3.  At L5-S1, a right-sided protrusion contacts the descending right S1 nerve root.   4.  At L3-L4, there is mild right lateral recess stenosis.   5.  Additional mild lower lumbar spondylosis as above.           ED COURSE & MEDICAL DECISION MAKING    Pertinent Labs and Imagaing reviewed (see chart for details)    35 year old male presented to emergency department with acute back pain.  Patient did not have reproducible pain on exam however any movement of his legs where he tries to lift them off the bed because of  severe back spasms.  Patient the patient's clinical presentation, an MRI was performed that did not show any evidence of obvious pathology that could easily explain his symptoms.  There is no evidence of surgical or other emergent pathology.  Unfortunately while in the emergency department, we have been unable to control the patient's pain despite Norflex, Toradol, Valium, and multiple doses of Dilaudid.  At this time, as the patient is not able to ambulate due to the severe pain, I will have to admit for pain control under observation status.    2:00 PM The patient was staffed with me.  3:08 PM I met with the patient, obtained history, performed an initial exam, and discussed options and plan for diagnostics and treatment here in the ED. PPE worn including N95 mask, surgical gloves.  3:29 PM I spoke with radiology who states they do not see any abnormalities with the psoas muscle in the MRI. Rechecked and updated the patient.  4:18 PM I rechecked and updated the patient. Even after valium, the patient cannot move his legs without having major spasms in his back.  5:46 PM I spoke with the hospitalist Dr. Gunn.    At the conclusion of the encounter I discussed  the results of all of the tests and the disposition.   The questions were answered.  The patient or family acknowledged understanding and was agreeable with the care plan.       FINAL IMPRESSION        1. Acute midline low back pain without sciatica            I, Raul Sherman, am serving as a scribe to document services personally performed by Sandoval Early DO, based on my observations and the provider's statements to me.  I, Sandoval Early DO, attest that Raul Sherman is acting in a scribe capacity, has observed my performance of the services and has documented them in accordance with my direction.     Sandoval Early D.O.  Emergency Medicine  [facility] EMERGENCY DEPARTMENT     Sandoval Early DO  10/22/21  1827

## 2021-10-22 NOTE — ED NOTES
Pt c/o spasms in lumbar area without specific spine pain with palpation.  States he is having spasm in low back that go to coccyx area and he is unable to ambulate.  No loss of sensation, bowel, bladder, numbness or ningling.  Does not radiate down legs.  Warm pack applied

## 2021-10-22 NOTE — CONSULTS
Care Management Initial Consult    General Information  Assessment completed with: Patient,    Type of CM/SW Visit: Initial Assessment    Primary Care Provider verified and updated as needed:     Readmission within the last 30 days: no previous admission in last 30 days      Reason for Consult: discharge planning  Advance Care Planning:            Communication Assessment  Patient's communication style: spoken language (English or Bilingual)    Hearing Difficulty or Deaf: no   Wear Glasses or Blind: no    Cognitive  Cognitive/Neuro/Behavioral: WDL                      Living Environment:   People in home: significant other     Current living Arrangements: house      Able to return to prior arrangements: yes       Family/Social Support:  Care provided by:    Provides care for:    Marital Status: Lives with Significant Other  Significant Other          Description of Support System:           Current Resources:   Patient receiving home care services: No     Community Resources: None  Equipment currently used at home: none  Supplies currently used at home: None    Employment/Financial:  Employment Status: employed full-time        Financial Concerns:             Lifestyle & Psychosocial Needs:  Social Determinants of Health     Tobacco Use:      Smoking Tobacco Use:      Smokeless Tobacco Use:    Alcohol Use:      Frequency of Alcohol Consumption:      Average Number of Drinks:      Frequency of Binge Drinking:    Financial Resource Strain:      Difficulty of Paying Living Expenses:    Food Insecurity:      Worried About Running Out of Food in the Last Year:      Ran Out of Food in the Last Year:    Transportation Needs:      Lack of Transportation (Medical):      Lack of Transportation (Non-Medical):    Physical Activity:      Days of Exercise per Week:      Minutes of Exercise per Session:    Stress:      Feeling of Stress :    Social Connections:      Frequency of Communication with Friends and Family:       Frequency of Social Gatherings with Friends and Family:      Attends Sabianist Services:      Active Member of Clubs or Organizations:      Attends Club or Organization Meetings:      Marital Status:    Intimate Partner Violence:      Fear of Current or Ex-Partner:      Emotionally Abused:      Physically Abused:      Sexually Abused:    Depression:      PHQ-2 Score:    Housing Stability:      Unable to Pay for Housing in the Last Year:      Number of Places Lived in the Last Year:      Unstable Housing in the Last Year:        Functional Status:  Prior to admission patient needed assistance:   Dependent ADLs:: Independent  Dependent IADLs:: Independent  Assesssment of Functional Status: Not at baseline with ADL Functioning    Mental Health Status:          Chemical Dependency Status:                Values/Beliefs:  Spiritual, Cultural Beliefs, Sabianist Practices, Values that affect care:                 Additional Information:     Pt lives with significant other Pino Amira: [phone] in a private residence. He awoke with low back pain and spams, unable to walk. Otherwise he is independent with cares. He has chronic low back pain due to injury a year ago.     Discharge needs to be determined on patient's progress. Family will transport. Informed CM will follow.     Lavonne Aparicio RN, CM, AHMET

## 2021-10-22 NOTE — ED NOTES
"[facility] ED Handoff Report    ED Chief Complaint:     ED Diagnosis:  (M54.50) Acute midline low back pain without sciatica  Comment:   Plan:        PMH:  No past medical history on file.     Code Status:  No Order     Falls Risk: Yes Band: Not applicable    Current Living Situation/Residence: lives with a significant other     Elimination Status: Continent: Yes     Activity Level: Independent at baseline.     Patients Preferred Language:  English     Needed: No    Vital Signs:  /83   Pulse 89   Temp 98.3  F (36.8  C) (Oral)   Resp 16   Ht 1.575 m (5' 2\")   Wt 81.6 kg (180 lb)   SpO2 100%   BMI 32.92 kg/m       Cardiac Rhythm    Pain Score: 10/10    Is the Patient Confused:  No    Last Food or Drink: 10/22/21   Focused Assessment:  Pt A/o x4. Denies sob and chest pain. 02 drop when sleep due to pain medications. 2lit 02 applied.  Bi leg pain 10/10. Pain meds given with no effects. Neuro intact.     Tests Performed: Done: Labs and Imaging    Treatments Provided:      Family Dynamics/Concerns: No    Family Updated On Visitor Policy: Yes    Plan of Care Communicated to Family: Yes    Who Was Updated about Plan of Care: Spouse    Belongings Checklist Done and Signed by Patient: No    Covid: asymptomatic , result pending     Additional Information:    RN: Thony Perez  10/22/2021 6:16 PM       "

## 2021-10-23 ENCOUNTER — APPOINTMENT (OUTPATIENT)
Dept: OCCUPATIONAL THERAPY | Facility: HOSPITAL | Age: 36
End: 2021-10-23
Attending: HOSPITALIST
Payer: COMMERCIAL

## 2021-10-23 ENCOUNTER — APPOINTMENT (OUTPATIENT)
Dept: PHYSICAL THERAPY | Facility: HOSPITAL | Age: 36
End: 2021-10-23
Attending: HOSPITALIST
Payer: COMMERCIAL

## 2021-10-23 LAB
ANION GAP SERPL CALCULATED.3IONS-SCNC: 8 MMOL/L (ref 5–18)
BUN SERPL-MCNC: 14 MG/DL (ref 8–22)
C REACTIVE PROTEIN LHE: 0.3 MG/DL (ref 0–0.8)
CALCIUM SERPL-MCNC: 9.1 MG/DL (ref 8.5–10.5)
CHLORIDE BLD-SCNC: 106 MMOL/L (ref 98–107)
CO2 SERPL-SCNC: 27 MMOL/L (ref 22–31)
CREAT SERPL-MCNC: 0.79 MG/DL (ref 0.7–1.3)
ERYTHROCYTE [DISTWIDTH] IN BLOOD BY AUTOMATED COUNT: 12.7 % (ref 10–15)
GFR SERPL CREATININE-BSD FRML MDRD: >90 ML/MIN/1.73M2
GLUCOSE BLD-MCNC: 98 MG/DL (ref 70–125)
HCT VFR BLD AUTO: 46 % (ref 40–53)
HGB BLD-MCNC: 15.2 G/DL (ref 13.3–17.7)
MCH RBC QN AUTO: 30.4 PG (ref 26.5–33)
MCHC RBC AUTO-ENTMCNC: 33 G/DL (ref 31.5–36.5)
MCV RBC AUTO: 92 FL (ref 78–100)
PLATELET # BLD AUTO: 262 10E3/UL (ref 150–450)
POTASSIUM BLD-SCNC: 4 MMOL/L (ref 3.5–5)
RBC # BLD AUTO: 5 10E6/UL (ref 4.4–5.9)
SODIUM SERPL-SCNC: 141 MMOL/L (ref 136–145)
WBC # BLD AUTO: 7.8 10E3/UL (ref 4–11)

## 2021-10-23 PROCEDURE — 86141 C-REACTIVE PROTEIN HS: CPT | Performed by: INTERNAL MEDICINE

## 2021-10-23 PROCEDURE — 258N000003 HC RX IP 258 OP 636: Performed by: INTERNAL MEDICINE

## 2021-10-23 PROCEDURE — G0378 HOSPITAL OBSERVATION PER HR: HCPCS

## 2021-10-23 PROCEDURE — 250N000011 HC RX IP 250 OP 636: Performed by: HOSPITALIST

## 2021-10-23 PROCEDURE — 97116 GAIT TRAINING THERAPY: CPT | Mod: GP

## 2021-10-23 PROCEDURE — 82947 ASSAY GLUCOSE BLOOD QUANT: CPT | Performed by: HOSPITALIST

## 2021-10-23 PROCEDURE — 97535 SELF CARE MNGMENT TRAINING: CPT | Mod: GO

## 2021-10-23 PROCEDURE — 99225 PR SUBSEQUENT OBSERVATION CARE,LEVEL II: CPT | Performed by: INTERNAL MEDICINE

## 2021-10-23 PROCEDURE — 36415 COLL VENOUS BLD VENIPUNCTURE: CPT | Performed by: HOSPITALIST

## 2021-10-23 PROCEDURE — 250N000013 HC RX MED GY IP 250 OP 250 PS 637: Performed by: HOSPITALIST

## 2021-10-23 PROCEDURE — 250N000013 HC RX MED GY IP 250 OP 250 PS 637: Performed by: INTERNAL MEDICINE

## 2021-10-23 PROCEDURE — 97530 THERAPEUTIC ACTIVITIES: CPT | Mod: GP

## 2021-10-23 PROCEDURE — 97166 OT EVAL MOD COMPLEX 45 MIN: CPT | Mod: GO

## 2021-10-23 PROCEDURE — 82374 ASSAY BLOOD CARBON DIOXIDE: CPT | Performed by: HOSPITALIST

## 2021-10-23 PROCEDURE — 97162 PT EVAL MOD COMPLEX 30 MIN: CPT | Mod: GP

## 2021-10-23 PROCEDURE — 85027 COMPLETE CBC AUTOMATED: CPT | Performed by: HOSPITALIST

## 2021-10-23 PROCEDURE — 96376 TX/PRO/DX INJ SAME DRUG ADON: CPT

## 2021-10-23 PROCEDURE — 96361 HYDRATE IV INFUSION ADD-ON: CPT

## 2021-10-23 RX ORDER — ALLOPURINOL 100 MG/1
100 TABLET ORAL DAILY
Status: DISCONTINUED | OUTPATIENT
Start: 2021-10-23 | End: 2021-10-24 | Stop reason: HOSPADM

## 2021-10-23 RX ORDER — SODIUM CHLORIDE 9 MG/ML
INJECTION, SOLUTION INTRAVENOUS CONTINUOUS
Status: DISCONTINUED | OUTPATIENT
Start: 2021-10-23 | End: 2021-10-24

## 2021-10-23 RX ORDER — POLYETHYLENE GLYCOL 3350 17 G/17G
17 POWDER, FOR SOLUTION ORAL DAILY
Status: DISCONTINUED | OUTPATIENT
Start: 2021-10-23 | End: 2021-10-24 | Stop reason: HOSPADM

## 2021-10-23 RX ORDER — AMOXICILLIN 250 MG
2 CAPSULE ORAL 2 TIMES DAILY
Status: DISCONTINUED | OUTPATIENT
Start: 2021-10-23 | End: 2021-10-24 | Stop reason: HOSPADM

## 2021-10-23 RX ORDER — OXYCODONE HYDROCHLORIDE 5 MG/1
5 TABLET ORAL EVERY 4 HOURS PRN
Status: DISCONTINUED | OUTPATIENT
Start: 2021-10-23 | End: 2021-10-24 | Stop reason: HOSPADM

## 2021-10-23 RX ORDER — AMOXICILLIN 250 MG
1 CAPSULE ORAL 2 TIMES DAILY
Status: DISCONTINUED | OUTPATIENT
Start: 2021-10-23 | End: 2021-10-24 | Stop reason: HOSPADM

## 2021-10-23 RX ORDER — NICOTINE 21 MG/24HR
1 PATCH, TRANSDERMAL 24 HOURS TRANSDERMAL DAILY
Status: DISCONTINUED | OUTPATIENT
Start: 2021-10-23 | End: 2021-10-24 | Stop reason: HOSPADM

## 2021-10-23 RX ORDER — FAMOTIDINE 20 MG/1
20 TABLET, FILM COATED ORAL 2 TIMES DAILY
Status: DISCONTINUED | OUTPATIENT
Start: 2021-10-23 | End: 2021-10-24 | Stop reason: HOSPADM

## 2021-10-23 RX ORDER — IBUPROFEN 600 MG/1
600 TABLET, FILM COATED ORAL 3 TIMES DAILY
Status: DISCONTINUED | OUTPATIENT
Start: 2021-10-23 | End: 2021-10-24 | Stop reason: HOSPADM

## 2021-10-23 RX ADMIN — ACETAMINOPHEN 975 MG: 325 TABLET ORAL at 12:11

## 2021-10-23 RX ADMIN — ALLOPURINOL 100 MG: 100 TABLET ORAL at 13:36

## 2021-10-23 RX ADMIN — ACETAMINOPHEN 975 MG: 325 TABLET ORAL at 05:00

## 2021-10-23 RX ADMIN — POLYETHYLENE GLYCOL 3350 17 G: 17 POWDER, FOR SOLUTION ORAL at 12:12

## 2021-10-23 RX ADMIN — HYDROMORPHONE HYDROCHLORIDE 1 MG: 1 INJECTION, SOLUTION INTRAMUSCULAR; INTRAVENOUS; SUBCUTANEOUS at 07:25

## 2021-10-23 RX ADMIN — METHOCARBAMOL 500 MG: 500 TABLET, FILM COATED ORAL at 16:06

## 2021-10-23 RX ADMIN — SODIUM CHLORIDE: 9 INJECTION, SOLUTION INTRAVENOUS at 13:38

## 2021-10-23 RX ADMIN — DOCUSATE SODIUM 50 MG AND SENNOSIDES 8.6 MG 1 TABLET: 8.6; 5 TABLET, FILM COATED ORAL at 12:11

## 2021-10-23 RX ADMIN — METHOCARBAMOL 500 MG: 500 TABLET, FILM COATED ORAL at 20:26

## 2021-10-23 RX ADMIN — FAMOTIDINE 20 MG: 20 TABLET, FILM COATED ORAL at 12:11

## 2021-10-23 RX ADMIN — FAMOTIDINE 20 MG: 20 TABLET, FILM COATED ORAL at 20:26

## 2021-10-23 RX ADMIN — IBUPROFEN 600 MG: 600 TABLET ORAL at 13:36

## 2021-10-23 RX ADMIN — IBUPROFEN 600 MG: 600 TABLET ORAL at 20:26

## 2021-10-23 RX ADMIN — DOCUSATE SODIUM 50 MG AND SENNOSIDES 8.6 MG 1 TABLET: 8.6; 5 TABLET, FILM COATED ORAL at 20:26

## 2021-10-23 RX ADMIN — METHOCARBAMOL 500 MG: 500 TABLET, FILM COATED ORAL at 13:02

## 2021-10-23 RX ADMIN — METHOCARBAMOL 500 MG: 500 TABLET, FILM COATED ORAL at 09:31

## 2021-10-23 RX ADMIN — ACETAMINOPHEN 975 MG: 325 TABLET ORAL at 20:26

## 2021-10-23 RX ADMIN — HYDROMORPHONE HYDROCHLORIDE 1 MG: 1 INJECTION, SOLUTION INTRAMUSCULAR; INTRAVENOUS; SUBCUTANEOUS at 00:33

## 2021-10-23 NOTE — PROGRESS NOTES
PRIMARY DIAGNOSIS: ACUTE PAIN  OUTPATIENT/OBSERVATION GOALS TO BE MET BEFORE DISCHARGE:  1. Pain Status: Improved-controlled with oral pain medications.    2. Return to near baseline physical activity: No    3. Cleared for discharge by consultants (if involved): No    Discharge Planner Nurse   Safe discharge environment identified: No  Barriers to discharge: Yes       Entered by: Geneva Nino 10/23/2021 12:18 PM     Please review provider order for any additional goals.   Nurse to notify provider when observation goals have been met and patient is ready for discharge.

## 2021-10-23 NOTE — SUMMARY OF CARE
PRIMARY DIAGNOSIS: ACUTE PAIN  OUTPATIENT/OBSERVATION GOALS TO BE MET BEFORE DISCHARGE:  1. Pain Status: Improved but still requiring IV narcotics.    2. Return to near baseline physical activity: No    3. Cleared for discharge by consultants (if involved): No    Discharge Planner Nurse   Safe discharge environment identified: No  Barriers to discharge: Yes       Entered by: Emilia Zhu 10/23/2021 8:20 AM     Please review provider order for any additional goals.   Nurse to notify provider when observation goals have been met and patient is ready for discharge.

## 2021-10-23 NOTE — SUMMARY OF CARE
PRIMARY DIAGNOSIS: ACUTE PAIN  OUTPATIENT/OBSERVATION GOALS TO BE MET BEFORE DISCHARGE:  1. Pain Status: Improved but still requiring IV narcotics.    2. Return to near baseline physical activity: No    3. Cleared for discharge by consultants (if involved): No    Discharge Planner Nurse   Safe discharge environment identified: No  Barriers to discharge: Yes       Entered by: Emilia Zhu 10/23/2021 8:24 AM     Please review provider order for any additional goals.   Nurse to notify provider when observation goals have been met and patient is ready for discharge.

## 2021-10-23 NOTE — PROGRESS NOTES
PRIMARY DIAGNOSIS: ACUTE PAIN  OUTPATIENT/OBSERVATION GOALS TO BE MET BEFORE DISCHARGE:  1. Pain Status: Improved but still requiring IV narcotics.    2. Return to near baseline physical activity: No    3. Cleared for discharge by consultants (if involved): No    Discharge Planner Nurse   Safe discharge environment identified: No  Barriers to discharge: Yes - pain control       Entered by: Geneva Nino 10/23/2021 7:57 AM     Please review provider order for any additional goals.   Nurse to notify provider when observation goals have been met and patient is ready for discharge.

## 2021-10-23 NOTE — PROGRESS NOTES
10/23/21 0940   Signing Clinician's Name / Credentials   Signing clinician's name / credentials Mary Reyes, OTR/L   Quick Adds   Rehab Discipline OT   ADL Training   Minutes of Treatment 15   Symptoms Noted During/After Treatment dizziness;increased pain   Treatment Instruction in compensatory methods;ADL training within precautions;Energy conservation/work simplification;Tasks graded to facilitate independence   Treatment Detail sup>sit mod A w/ cues for logroll and breathing, unable to sit at EOB for more than few seconds d/t pain, stood at FWW w/ CGA for 2 minutes, some dizziness, unable to sidestep or amb. forward/backward, dependent w/ socks, education on logroll, positioing in bed  w/ pillow under knees and avoiding bending w drsg,sit>sup quickly w/ min A to avoid having to sit EOB     OT Discharge Planning    OT Discharge Recommendation (DC Rec) Home with assist   OT Rationale for DC Rec pending progress w/ pain and ability to trsf and amb. to BR   Additional Documentation   OT Plan bed mob (logroll), supine drsg, trsfs or amb. to BR as able,

## 2021-10-23 NOTE — UTILIZATION REVIEW
Concurrent stay review; Secondary Review Determination     Under the authority of the Utilization Management Committee, the utilization review process indicated a secondary review on Jeromy Valdivia.  The review outcome is based on review of the medical records, discussions with staff, and applying clinical experience noted on the date of the review.        (x) Observation Status Appropriate - Concurrent stay review    RATIONALE FOR DETERMINATION   35 yr old male with gout who presented with back pain.  No radiculopathy, MRI negative for acute findings, suspected musculoskeletal.  Working on pain control.  IVF started as poor oral intake overnight/this AM however labs normal.  If not able to discharge tomorrow (crossing 3rd night) due to pain issues requiring further IV meds or other acute issues arise then consider change to inpatient.    Patient is clinically improving and there is no clear indication to change patient's status to inpatient. The severity of illness, intensity of service provided, expected LOS and risk for adverse outcome make the care appropriate for observation.    The information on this document is developed by the utilization review team in order for the business office to ensure compliance.  This only denotes the appropriateness of proper admission status and does not reflect the quality of care rendered.         The definitions of Inpatient Status and Observation Status used in making the determination above are those provided in the CMS Coverage Manual, Chapter 1 and Chapter 6, section 70.4.      Sincerely,   Lisa Diego MD  Utilization Review  Physician Advisor  [facility]

## 2021-10-23 NOTE — PLAN OF CARE
Problem: Pain Acute  Goal: Acceptable Pain Control and Functional Ability  Outcome: No Change     Patient continues to report pain in lower back. Reports sometimes there is no pain if he is sitting still but mostly pain gets down to a five from a 10. Pain regimen changed today. Pain controlled with scheduled Tylenol, Ibuprofen and Robaxin. Writer offered oxycodone but patient refused. Using heat pad and ice packs. Working with therapy. Call light within reach and able to make needs known.

## 2021-10-23 NOTE — PROGRESS NOTES
[facility]    PROGRESS NOTE - Hospitalist Service    Assessment and Plan  75 years old male with a past medical history of gout who presents to the ER for his evaluation of back pain, he was admitted with    Acute back pain  - Sudden onset of back pain yesterday after trying to get off toilet seat., no injuries or trauma reported.  - Unremarkable MRI for acute findings.  - No radiculopathy symptoms  - Probably musculoskeletal  - No signs or symptoms of infection, unremarkable CRP  - Pain is still uncontrolled and patient had 3 flights of stairs to get to his apartment  - Continue scheduled tylenol 1000mg TID  - Discontinue IV Dilaudid and start oral oxycodone   - Add scheduled ibuprofen  - Continue with Toradol and robaxin   - PT/OT consulted    History of gout  - No sign or symptom of acute exacerbation  - Resume allopurinol    Low blood pressure  - Patient is not eating or drinking much because of pain  - Start IV fluid support    Smoking dependence  - Start nicotine patch    Active Problems:    Acute midline low back pain without sciatica      VTE prophylaxis:  Pneumatic Compression Devices  DIET: Orders Placed This Encounter      Regular Diet Adult      Disposition/Barriers to discharge: Pain control, PT evaluation  Code Status: Full Code    Subjective:  Jeromy is feeling about the same today, still has significant pain with ambulation.  Denies any chest pain or shortness of breath.    PHYSICAL EXAM  Vitals:    10/22/21 1137   Weight: 81.6 kg (180 lb)     B/P:100/62 T:98.4 P:89 R:16     Intake/Output Summary (Last 24 hours) at 10/23/2021 1231  Last data filed at 10/23/2021 0700  Gross per 24 hour   Intake 240 ml   Output 525 ml   Net -285 ml      Body mass index is 32.92 kg/m .    Constitutional: awake, alert, cooperative, no apparent distress, and appears stated age  Eyes: Lids and lashes normal, pupils equal, round and reactive to light, extra ocular muscles intact, sclera clear,  conjunctiva normal  ENT: Normocephalic, without obvious abnormality, atraumatic, sinuses nontender on palpation, external ears without lesions, oral pharynx with moist mucous membranes, tonsils without erythema or exudates, gums normal and good dentition.  Respiratory: No increased work of breathing, good air exchange, clear to auscultation bilaterally, no crackles or wheezing  Cardiovascular: Normal apical impulse, regular rate and rhythm, normal S1 and S2, no S3 or S4, and no murmur noted  GI: No scars, normal bowel sounds, soft, non-distended, non-tender, no masses palpated, no hepatosplenomegally  Skin: no bruising or bleeding and normal skin color, texture, turgor  Musculoskeletal: There is no redness, warmth, or swelling of the joints.  Full range of motion noted.  no lower extremity pitting edema present  Neurologic: Awake, alert, oriented to name, place and time.  Cranial nerves II-XII are grossly intact.  Motor is 5 out of 5 bilaterally.   Sensory is intact.  Positive straight leg raising test bilaterally.  Neuropsychiatric: Appropriate with examiner      PERTINENT LABS/IMAGING:  Recent Labs   Lab 10/23/21  0802   WBC 7.8   HGB 15.2   MCV 92         POTASSIUM 4.0   CHLORIDE 106   CO2 27   BUN 14   CR 0.79   ANIONGAP 8   CAAR 9.1   GLC 98     Recent Results (from the past 24 hour(s))   Lumbar spine MRI w/o contrast    Narrative    EXAM: MR LUMBAR SPINE W/O CONTRAST  LOCATION: [facility]  DATE/TIME: 10/22/2021 2:22 PM    INDICATION: Severe low back pain.  COMPARISON: None.  TECHNIQUE: Routine Lumbar Spine MRI without IV contrast.    FINDINGS:   Nomenclature is based on five lumbar-type vertebral bodies.    Vertebral body heights are unremarkable. There is no evidence of a marrow-replacing process. There is no evidence of abnormal bony edema. The imaged portions of the proximal femora, sacrum and gabe appear intact.    There is mild dextroconvex curvature. Lumbar lordosis  is unremarkable.    The conus terminates at L1. No signal abnormalities of the visualized cord or cauda equina nerve roots are demonstrated.    The paraspinal soft tissues are unremarkable.    Limited images of the abdominal cavity demonstrate no acute abnormality. There is no evidence of abdominal aortic aneurysm.    T12-L1: Normal disc height and signal. No herniation. Normal facets. No spinal canal or neural foraminal stenosis.     L1-L2: Normal disc height and signal. No herniation. Normal facets. No spinal canal or neural foraminal stenosis.    L2-L3: Normal disc height and signal. No herniation. Normal facets. No spinal canal or neural foraminal stenosis.     L3-L4: Mild intervertebral disc height loss with disc desiccation. Shallow bulge and right paracentral protrusion. No significant spinal canal stenosis. Mild right lateral recess effacement. Mild facet arthropathy. No significant neural foraminal   stenosis.    L4-L5: Mild intervertebral disc height loss with disc desiccation. Shallow bulge and central/left paracentral extrusion. Mild facet arthropathy. No significant spinal canal stenosis. No significant neural foraminal stenosis.    L5-S1: Mild intervertebral disc height loss with disc desiccation. Broad-based disc bulging with right-sided protrusion abutting the descending S1 nerve root. No spinal canal stenosis or neural foraminal stenosis.      Impression    IMPRESSION:  1.  No findings to suggest acute fracture.  2.  No high-grade stenosis of the spinal canal or neural foramina demonstrated.  3.  At L5-S1, a right-sided protrusion contacts the descending right S1 nerve root.  4.  At L3-L4, there is mild right lateral recess stenosis.  5.  Additional mild lower lumbar spondylosis as above.       Discussed with patient, family, nursing staff and discharge planner    Vineet Mcdaniel MD  [facility] Medicine Service  [phone]

## 2021-10-23 NOTE — PLAN OF CARE
Problem: Adult Inpatient Plan of Care  Goal: Optimal Comfort and Wellbeing  Outcome: Improving     Problem: Adult Inpatient Plan of Care  Goal: Absence of Hospital-Acquired Illness or Injury  Intervention: Identify and Manage Fall Risk  Recent Flowsheet Documentation  Taken 10/23/2021 0501 by Emilia Washburn, RN  Safety Promotion/Fall Prevention:   lighting adjusted   nonskid shoes/slippers when out of bed   patient and family education  Taken 10/23/2021 0035 by Emilia Washburn, RN  Safety Promotion/Fall Prevention:   lighting adjusted   nonskid shoes/slippers when out of bed   patient and family education  Taken 10/22/2021 2030 by Emilia Washburn, RN  Safety Promotion/Fall Prevention:   lighting adjusted   nonskid shoes/slippers when out of bed   patient and family education

## 2021-10-23 NOTE — PROGRESS NOTES
10/23/21 0940   Quick Adds   Type of Visit Initial Occupational Therapy Evaluation   Living Environment   People in home significant other   Current Living Arrangements apartment   Home Accessibility other (see comments)  (stairs to 3rd floor apt)   Transportation Anticipated family or friend will provide   Living Environment Comments no elevator   Self-Care   Usual Activity Tolerance good   Current Activity Tolerance poor   Equipment Currently Used at Home none  (tub/shower, standard toilet)   Instrumental Activities of Daily Living (IADL)   IADL Comments indep.all ADLs   Disability/Function   Hearing Difficulty or Deaf no   Wear Glasses or Blind no   Concentrating, Remembering or Making Decisions Difficulty no   Difficulty Communicating no   Difficulty Eating/Swallowing no   Walking or Climbing Stairs Difficulty no   Dressing/Bathing Difficulty no   Toileting issues no   Doing Errands Independently Difficulty (such as shopping) yes   Fall history within last six months no   General Information   Onset of Illness/Injury or Date of Surgery 10/22/21   Patient/Family Therapy Goal Statement (OT) feel better   Existing Precautions/Restrictions spinal   Cognitive Status Examination   Affect/Mental Status (Cognitive) WFL   Visual Perception   Visual Impairment/Limitations WFL   Sensory   Sensory Quick Adds No deficits were identified   Pain Assessment   Patient Currently in Pain Yes, see Vital Sign flowsheet   Range of Motion Comprehensive   General Range of Motion no range of motion deficits identified   Strength Comprehensive (MMT)   General Manual Muscle Testing (MMT) Assessment no strength deficits identified   Coordination   Upper Extremity Coordination No deficits were identified   Bed Mobility   Bed Mobility supine-sit;sit-supine;rolling left;rolling right   Rolling Left [place] (Bed Mobility) contact guard   Rolling Right [place] (Bed Mobility) minimum assist (75% patient effort)   Supine-Sit  [place] (Bed Mobility) moderate assist (50% patient effort)   Sit-Supine [place] (Bed Mobility) minimum assist (75% patient effort)   Assistive Device (Bed Mobility) bed rails   Comment (Bed Mobility) increased pain, unable to sit for more than a few seconds    Transfers   Transfers other (see comments)  (sit to stand at EOB, min A w/ FWW, stood 2 minutes,)   Transfer Comments some dizziness w/ standing, unable to take sidesteps or amb. forward/backward   Balance   Balance Assessment standing balance: static   Standing Balance: Static WFL   Lower Body Dressing Assessment   [place] Level (Lower Body Dressing) dependent (less than 25% patient effort)   Position (Lower Body Dressing) supine   Comment (Lower Body Dressing) socks   Clinical Impression   Criteria for Skilled Therapeutic Interventions Met (OT) yes   OT Diagnosis decreased ADLs   OT Problem List-Impairments impacting ADL pain   Assessment of Occupational Performance 1-3 Performance Deficits   Identified Performance Deficits drsg, trsfs, toileting   Planned Therapy Interventions (OT) ADL retraining;transfer training   Clinical Decision Making Complexity (OT) moderate complexity   Therapy Frequency (OT) Daily   Predicted Duration of Therapy 5 days   Anticipated Equipment Needs Upon Discharge (OT)   (cont. to assess)   Risk & Benefits of therapy have been explained patient   OT Discharge Planning    OT Discharge Recommendation (DC Rec) Home with assist   OT Rationale for DC Rec pending progress w/ pain and ability to trsf and amb. to [place]   Total Evaluation Time (Minutes)   Total Evaluation Time (Minutes) 15

## 2021-10-24 ENCOUNTER — APPOINTMENT (OUTPATIENT)
Dept: PHYSICAL THERAPY | Facility: HOSPITAL | Age: 36
End: 2021-10-24
Payer: COMMERCIAL

## 2021-10-24 VITALS
HEIGHT: 62 IN | WEIGHT: 180 LBS | HEART RATE: 88 BPM | TEMPERATURE: 98.2 F | DIASTOLIC BLOOD PRESSURE: 76 MMHG | RESPIRATION RATE: 16 BRPM | OXYGEN SATURATION: 98 % | SYSTOLIC BLOOD PRESSURE: 116 MMHG | BODY MASS INDEX: 33.13 KG/M2

## 2021-10-24 LAB
ALBUMIN UR-MCNC: NEGATIVE MG/DL
APPEARANCE UR: CLEAR
BILIRUB UR QL STRIP: NEGATIVE
COLOR UR AUTO: YELLOW
GLUCOSE UR STRIP-MCNC: NEGATIVE MG/DL
HGB UR QL STRIP: ABNORMAL
KETONES UR STRIP-MCNC: NEGATIVE MG/DL
LEUKOCYTE ESTERASE UR QL STRIP: NEGATIVE
MUCOUS THREADS #/AREA URNS LPF: PRESENT /LPF
NITRATE UR QL: NEGATIVE
PH UR STRIP: 6 [PH] (ref 5–7)
RBC URINE: 1 /HPF
SP GR UR STRIP: 1.03 (ref 1–1.03)
UROBILINOGEN UR STRIP-MCNC: 3 MG/DL
WBC URINE: <1 /HPF

## 2021-10-24 PROCEDURE — 81001 URINALYSIS AUTO W/SCOPE: CPT | Performed by: INTERNAL MEDICINE

## 2021-10-24 PROCEDURE — 250N000013 HC RX MED GY IP 250 OP 250 PS 637: Performed by: INTERNAL MEDICINE

## 2021-10-24 PROCEDURE — 99217 PR OBSERVATION CARE DISCHARGE: CPT | Performed by: INTERNAL MEDICINE

## 2021-10-24 PROCEDURE — 250N000013 HC RX MED GY IP 250 OP 250 PS 637: Performed by: HOSPITALIST

## 2021-10-24 PROCEDURE — 97116 GAIT TRAINING THERAPY: CPT | Mod: GP

## 2021-10-24 PROCEDURE — G0378 HOSPITAL OBSERVATION PER HR: HCPCS

## 2021-10-24 PROCEDURE — 97530 THERAPEUTIC ACTIVITIES: CPT | Mod: GP

## 2021-10-24 RX ORDER — FAMOTIDINE 20 MG/1
20 TABLET, FILM COATED ORAL 2 TIMES DAILY
Qty: 60 TABLET | Refills: 0 | Status: SHIPPED | OUTPATIENT
Start: 2021-10-24 | End: 2021-11-23

## 2021-10-24 RX ORDER — METHOCARBAMOL 500 MG/1
500 TABLET, FILM COATED ORAL 4 TIMES DAILY
Qty: 40 TABLET | Refills: 0 | Status: SHIPPED | OUTPATIENT
Start: 2021-10-24 | End: 2021-11-03

## 2021-10-24 RX ORDER — ACETAMINOPHEN 325 MG/1
975 TABLET ORAL EVERY 8 HOURS
Qty: 180 TABLET | Refills: 0 | Status: SHIPPED | OUTPATIENT
Start: 2021-10-24 | End: 2021-11-13

## 2021-10-24 RX ORDER — IBUPROFEN 600 MG/1
600 TABLET, FILM COATED ORAL 3 TIMES DAILY
Qty: 30 TABLET | Refills: 0 | Status: SHIPPED | OUTPATIENT
Start: 2021-10-24 | End: 2021-11-03

## 2021-10-24 RX ADMIN — IBUPROFEN 600 MG: 600 TABLET ORAL at 09:09

## 2021-10-24 RX ADMIN — FAMOTIDINE 20 MG: 20 TABLET, FILM COATED ORAL at 09:09

## 2021-10-24 RX ADMIN — ACETAMINOPHEN 975 MG: 325 TABLET ORAL at 04:41

## 2021-10-24 RX ADMIN — OXYCODONE HYDROCHLORIDE 5 MG: 5 TABLET ORAL at 01:05

## 2021-10-24 RX ADMIN — ALLOPURINOL 100 MG: 100 TABLET ORAL at 09:09

## 2021-10-24 RX ADMIN — METHOCARBAMOL 500 MG: 500 TABLET, FILM COATED ORAL at 09:09

## 2021-10-24 NOTE — PLAN OF CARE
Physical Therapy Discharge Summary    Reason for therapy discharge:    Discharged to home with home therapy.    Progress towards therapy goal(s). See goals on Care Plan in [facility] electronic health record for goal details.  Goals partially met.  Barriers to achieving goals:   limited tolerance for therapy and discharge from facility.    Therapy recommendation(s):    Continued therapy is recommended.  Rationale/Recommendations:  to improve mobility and increase strength. .

## 2021-10-24 NOTE — PLAN OF CARE
PRIMARY DIAGNOSIS: ACUTE PAIN  OUTPATIENT/OBSERVATION GOALS TO BE MET BEFORE DISCHARGE:  1. Pain Status: Improved-controlled with oral pain medications. Pt tolerating pain with scheduled meds, ice packs, and heating pad.     2. Return to near baseline physical activity: No. Pt having difficulty ambulating and sitting upright. Requires assist of 1 and walker.    3. Cleared for discharge by consultants (if involved): No    Discharge Planner Nurse   Safe discharge environment identified: No  Barriers to discharge: Yes       Entered by: Melodie Haddad 10/23/2021 11:13 PM     Please review provider order for any additional goals.   Nurse to notify provider when observation goals have been met and patient is ready for discharge.

## 2021-10-24 NOTE — PLAN OF CARE
Problem: Pain Acute  Goal: Acceptable Pain Control and Functional Ability  Outcome: Improving  Intervention: Develop Pain Management Plan  Recent Flowsheet Documentation  Taken 10/24/2021 0755 by Geneva Nino, RN  Pain Management Interventions: medication (see MAR)     Pain controlled with scheduled Ibuprofen and robaxin. Patient rating pain 7/10 per him is better compared to yesterday when his pain was 9-10/10. Has been out of bed and sitting in chair. Worked with therapy today and will discharge this afternoon.   PRIMARY DIAGNOSIS: ACUTE PAIN  OUTPATIENT/OBSERVATION GOALS TO BE MET BEFORE DISCHARGE:  1. Pain Status: Improved-controlled with oral pain medications.    2. Return to near baseline physical activity: Yes    3. Cleared for discharge by consultants (if involved): Yes    Discharge Planner Nurse   Safe discharge environment identified: Yes  Barriers to discharge: No       Entered by: Geneva Nino 10/24/2021 11:02 AM     Please review provider order for any additional goals.   Nurse to notify provider when observation goals have been met and patient is ready for discharge.

## 2021-10-24 NOTE — DISCHARGE SUMMARY
[facility]  Hospitalist Discharge Summary      Date of Admission:  10/22/2021  Date of Discharge:  10/24/2021  Discharging Provider: Vineet Mcdaniel MD      Discharge Diagnoses   Acute back pain, improving  History of gout  Low blood pressure, resolved  Smoking dependence    Follow-ups Needed After Discharge   Follow-up Appointments     Follow-up and recommended labs and tests       Follow up with primary care provider, Provider Not In System, within 7   days for hospital follow- up.  No follow up labs or test are needed.             Unresulted Labs Ordered in the Past 30 Days of this Admission     No orders found from 9/22/2021 to 10/23/2021.      These results will be followed up by PCP    Discharge Disposition   Discharged to home with home care  Condition at discharge: Stable    Hospital Course   75 years old male with a past medical history of gout who presents to the ER for his evaluation of back pain, he was admitted, please refer to H&P for details     Acute back pain  - Sudden onset of back pain yesterday after trying to get off toilet seat., no injuries or trauma reported.  - Unremarkable MRI for acute findings.  - No radiculopathy symptoms  - Probably musculoskeletal  - No signs or symptoms of infection, unremarkable CRP  - Pain is improving and patient is able to ambulate, of note that patient had 3 flights of stairs to get to his apartment with no elevator  - Continue scheduled tylenol 1000mg TID  - Discontinue IV Dilaudid and start oral oxycodone   - Add scheduled ibuprofen  - Continue with Toradol and robaxin   - PT/OT consulted  - Patient would need home care on discharge     History of gout  - No sign or symptom of acute exacerbation  - Resume allopurinol     Low blood pressure  - Patient is not eating or drinking much because of pain  - Improving with IV fluid support  - Discontinue IV fluid     Smoking dependence  - Start nicotine patch    Consultations This Hospital Stay    SOCIAL WORK IP CONSULT  PHYSICAL THERAPY ADULT IP CONSULT  OCCUPATIONAL THERAPY ADULT IP CONSULT    Code Status   Full Code    Time Spent on this Encounter   I, Vineet Mcdaniel MD, personally saw the patient today and spent greater than 30 minutes discharging this patient.       Vineet Mcdaniel MD  [address] [address]  Phone: [phone]  Fax: [phone]  ______________________________________________________________________    Physical Exam   Vital Signs: Temp: 98.2  F (36.8  C) Temp src: Oral BP: 116/76 Pulse: 88   Resp: 16 SpO2: 98 % O2 Device: None (Room air)    Weight: 180 lbs 0 oz  Constitutional: awake, alert, cooperative, no apparent distress, and appears stated age  Eyes: Lids and lashes normal, pupils equal, round and reactive to light, extra ocular muscles intact, sclera clear, conjunctiva normal  ENT: Normocephalic, without obvious abnormality, atraumatic, sinuses nontender on palpation, external ears without lesions, oral pharynx with moist mucous membranes, tonsils without erythema or exudates, gums normal and good dentition.  Respiratory: No increased work of breathing, good air exchange, clear to auscultation bilaterally, no crackles or wheezing  Cardiovascular: Normal apical impulse, regular rate and rhythm, normal S1 and S2, no S3 or S4, and no murmur noted  GI: No scars, normal bowel sounds, soft, non-distended, non-tender, no masses palpated, no hepatosplenomegally  Skin: no bruising or bleeding and normal skin color, texture, turgor  Musculoskeletal: There is no redness, warmth, or swelling of the joints.  Full range of motion noted.  no lower extremity pitting edema present  Neurologic: Awake, alert, oriented to name, place and time.  Cranial nerves II-XII are grossly intact.  Motor is 5 out of 5 bilaterally.   Sensory is intact.  Positive straight leg raising test bilaterally.  Neuropsychiatric: Appropriate with examiner        Primary Care Physician   Provider Not In System    Discharge Orders      Home Care PT Referral for Hospital Discharge      Reason for your hospital stay    Acute back pain     Follow-up and recommended labs and tests     Follow up with primary care provider, Provider Not In System, within 7 days for hospital follow- up.  No follow up labs or test are needed.     Activity    Your activity upon discharge: activity as tolerated     MD face to face encounter    Documentation of Face to Face and Certification for Home Health Services    I certify that patient: Jeromy Valdivia is under my care and that I, or a nurse practitioner or physician's assistant working with me, had a face-to-face encounter that meets the physician face-to-face encounter requirements with this patient on: 10/24/2021.    This encounter with the patient was in whole, or in part, for the following medical condition, which is the primary reason for home health care: 35 years old male admitted with acute back pain.    I certify that, based on my findings, the following services are medically necessary home health services: Physical Therapy.    My clinical findings support the need for the above services because: Physical Therapy Services are needed to assess and treat the following functional impairments: Back pain.    Further, I certify that my clinical findings support that this patient is homebound (i.e. absences from home require considerable and taxing effort and are for medical reasons or [employer] or infrequently or of short duration when for other reasons) because: Patient is bedbound due to: Back injury with 3 flights of stairs and no elevator..    Based on the above findings. I certify that this patient is confined to the home and needs intermittent skilled nursing care, physical therapy and/or speech therapy.  The patient is under my care, and I have initiated the establishment of the plan of care.  This patient will be followed by a  physician who will periodically review the plan of care.  Physician/Provider to provide follow up care: System, Provider Not In    Attending hospital physician (the Medicare certified [facility] provider): Vineet Mcdaniel MD  Physician Signature: See electronic signature associated with these discharge orders.  Date: 10/24/2021     Walker Order    DME Documentation:   Describe the reason for need to support medical necessity: LBP.     I, the undersigned, certify that the above prescribed supplies are medically necessary for this patient and is both reasonable and necessary in reference to accepted standards of medical and necessary in reference to accepted standards of medical practice in the treatment of this patient's condition and is not prescribed as a convenience.     Diet    Follow this diet upon discharge: Orders Placed This Encounter      Regular Diet Adult       Significant Results and Procedures   Most Recent 3 CBC's:Recent Labs   Lab Test 10/23/21  0802 08/11/21  1549   WBC 7.8 7.2   HGB 15.2 15.6   MCV 92 89    262     Most Recent 3 BMP's:Recent Labs   Lab Test 10/23/21  0802 08/11/21  1549    140   POTASSIUM 4.0 3.8   CHLORIDE 106 106   CO2 27 28   BUN 14 10   CR 0.79 0.83   ANIONGAP 8 6   CARA 9.1 9.2   GLC 98 97   ,   Results for orders placed or performed during the hospital encounter of 10/22/21   Lumbar spine MRI w/o contrast    Narrative    EXAM: MR LUMBAR SPINE W/O CONTRAST  LOCATION: [facility]  DATE/TIME: 10/22/2021 2:22 PM    INDICATION: Severe low back pain.  COMPARISON: None.  TECHNIQUE: Routine Lumbar Spine MRI without IV contrast.    FINDINGS:   Nomenclature is based on five lumbar-type vertebral bodies.    Vertebral body heights are unremarkable. There is no evidence of a marrow-replacing process. There is no evidence of abnormal bony edema. The imaged portions of the proximal femora, sacrum and gabe appear intact.    There is mild dextroconvex curvature.  Lumbar lordosis is unremarkable.    The conus terminates at L1. No signal abnormalities of the visualized cord or cauda equina nerve roots are demonstrated.    The paraspinal soft tissues are unremarkable.    Limited images of the abdominal cavity demonstrate no acute abnormality. There is no evidence of abdominal aortic aneurysm.    T12-L1: Normal disc height and signal. No herniation. Normal facets. No spinal canal or neural foraminal stenosis.     L1-L2: Normal disc height and signal. No herniation. Normal facets. No spinal canal or neural foraminal stenosis.    L2-L3: Normal disc height and signal. No herniation. Normal facets. No spinal canal or neural foraminal stenosis.     L3-L4: Mild intervertebral disc height loss with disc desiccation. Shallow bulge and right paracentral protrusion. No significant spinal canal stenosis. Mild right lateral recess effacement. Mild facet arthropathy. No significant neural foraminal   stenosis.    L4-L5: Mild intervertebral disc height loss with disc desiccation. Shallow bulge and central/left paracentral extrusion. Mild facet arthropathy. No significant spinal canal stenosis. No significant neural foraminal stenosis.    L5-S1: Mild intervertebral disc height loss with disc desiccation. Broad-based disc bulging with right-sided protrusion abutting the descending S1 nerve root. No spinal canal stenosis or neural foraminal stenosis.      Impression    IMPRESSION:  1.  No findings to suggest acute fracture.  2.  No high-grade stenosis of the spinal canal or neural foramina demonstrated.  3.  At L5-S1, a right-sided protrusion contacts the descending right S1 nerve root.  4.  At L3-L4, there is mild right lateral recess stenosis.  5.  Additional mild lower lumbar spondylosis as above.       Discharge Medications   Current Discharge Medication List      START taking these medications    Details   acetaminophen (TYLENOL) 325 MG tablet Take 3 tablets (975 mg) by mouth every 8 hours  for 20 days  Qty: 180 tablet, Refills: 0    Associated Diagnoses: Acute midline low back pain without sciatica      famotidine (PEPCID) 20 MG tablet Take 1 tablet (20 mg) by mouth 2 times daily  Qty: 60 tablet, Refills: 0    Associated Diagnoses: Dyspepsia      ibuprofen (ADVIL/MOTRIN) 600 MG tablet Take 1 tablet (600 mg) by mouth 3 times daily for 10 days  Qty: 30 tablet, Refills: 0    Associated Diagnoses: Acute midline low back pain without sciatica      methocarbamol (ROBAXIN) 500 MG tablet Take 1 tablet (500 mg) by mouth 4 times daily for 10 days  Qty: 40 tablet, Refills: 0    Associated Diagnoses: Acute midline low back pain without sciatica         CONTINUE these medications which have NOT CHANGED    Details   allopurinol (ZYLOPRIM) 100 MG tablet Take 100 mg by mouth daily         STOP taking these medications       baclofen (LIORESAL) 10 MG tablet Comments:   Reason for Stopping:             Allergies   Allergies   Allergen Reactions     Flagyl [Metronidazole] Hives

## 2021-10-24 NOTE — PROGRESS NOTES
PRIMARY DIAGNOSIS: GENERALIZED WEAKNESS    OUTPATIENT/OBSERVATION GOALS TO BE MET BEFORE DISCHARGE  1. Orthostatic performed: No    2. Tolerating PO medications: Yes    3. Return to near baseline physical activity: No    4. Cleared for discharge by consultants (if involved): No    Discharge Planner Nurse   Safe discharge environment identified: No  Barriers to discharge: Yes       Entered by: Thania Marti 10/24/2021 5:42 AM     Please review provider order for any additional goals.   Nurse to notify provider when observation goals have been met and patient is ready for discharge.

## 2021-10-24 NOTE — PROGRESS NOTES
Care Management Follow Up    Length of Stay (days): 0    Expected Discharge Date: 10/25/2021     Concerns to be Addressed: discharge planning     Patient plan of care discussed at interdisciplinary rounds: Yes    Anticipated Discharge Disposition: Home Care     Anticipated Discharge Services:  PT  Anticipated Discharge DME:  None     Education Provided on the Discharge Plan:  yes  Patient/Family in Agreement with the Plan: yes     Referrals Placed by CM/SW:    Private pay costs discussed: Not applicable    Additional Information:  SWCM updated with pt RN and reviewed Chart. Recs for home PT and noted pt agreeable.   Referral to [facility] for PT (pend).   Per intake -SOC out until Nov 4th .     Referral to [facility] (pend).   Orders faxed and VM left for  to inquire about ability to accept pt for services.   Fadumo Suresh, NGHIASW

## 2021-10-24 NOTE — PLAN OF CARE
[id number] Shift Summary     A&Ox4. Rating pain 8-9 to lower back. PRN Oxycodone given x1, sleeping upon reassessment. Order obtained to stop IVF- explained to pt he needs to improve PO intake. Closely monitoring BP if fluids would need to be restarted due to hypotension. Makes needs known, call light within reach.    Thania Marti RN    Problem: OT General Care Plan  Goal: Transfer (OT)  Description: Transfer (OT)  Outcome: No Change     Problem: Pain Acute  Goal: Acceptable Pain Control and Functional Ability  Outcome: No Change

## 2021-10-24 NOTE — PLAN OF CARE
PRIMARY DIAGNOSIS: ACUTE PAIN  OUTPATIENT/OBSERVATION GOALS TO BE MET BEFORE DISCHARGE:  1. Pain Status: Improved-controlled with oral pain medications.    2. Return to near baseline physical activity: No    3. Cleared for discharge by consultants (if involved): No    Discharge Planner Nurse   Safe discharge environment identified: No  Barriers to discharge: Yes       Entered by: Melodie Haddad 10/23/2021 7:48 PM     Please review provider order for any additional goals.   Nurse to notify provider when observation goals have been met and patient is ready for discharge.

## 2021-10-24 NOTE — PLAN OF CARE
Occupational Therapy Discharge Summary    Reason for therapy discharge:    Discharged to home.    Progress towards therapy goal(s). See goals on Care Plan in [facility] electronic health record for goal details.  Goals met    Therapy recommendation(s):    No further therapy is recommended.

## 2021-10-27 ENCOUNTER — TELEPHONE (OUTPATIENT)
Dept: FAMILY MEDICINE | Facility: CLINIC | Age: 36
End: 2021-10-27

## 2021-10-27 ENCOUNTER — OFFICE VISIT (OUTPATIENT)
Dept: FAMILY MEDICINE | Facility: CLINIC | Age: 36
End: 2021-10-27
Payer: COMMERCIAL

## 2021-10-27 VITALS
WEIGHT: 184 LBS | DIASTOLIC BLOOD PRESSURE: 71 MMHG | SYSTOLIC BLOOD PRESSURE: 108 MMHG | HEART RATE: 109 BPM | OXYGEN SATURATION: 99 % | BODY MASS INDEX: 33.65 KG/M2 | RESPIRATION RATE: 16 BRPM

## 2021-10-27 DIAGNOSIS — M54.50 ACUTE MIDLINE LOW BACK PAIN WITHOUT SCIATICA: Primary | ICD-10-CM

## 2021-10-27 PROCEDURE — 99213 OFFICE O/P EST LOW 20 MIN: CPT | Performed by: FAMILY MEDICINE

## 2021-10-27 RX ORDER — METHYLPREDNISOLONE 4 MG
TABLET, DOSE PACK ORAL
Qty: 21 TABLET | Refills: 0 | Status: SHIPPED | OUTPATIENT
Start: 2021-10-27 | End: 2021-11-02

## 2021-10-27 RX ORDER — LIDOCAINE 4 G/G
1 PATCH TOPICAL EVERY 24 HOURS
Qty: 15 PATCH | Refills: 1 | Status: SHIPPED | OUTPATIENT
Start: 2021-10-27 | End: 2021-11-02

## 2021-10-27 NOTE — TELEPHONE ENCOUNTER
"Attempt to call geneva and was unable to reach her due to number not working if home care call back it was okay for orders .  \" Okay to relay message\"   "

## 2021-10-27 NOTE — LETTER
[facility] [address] [place] [address]  Phone: [phone]  Fax: [phone]    October 27, 2021        Jeromy Valdivia  [address]  [address] [place] [address]          To whom it may concern:    RE: Jeromy Valdivia    Patient was seen and treated today at our clinic.he is unable to return to work for 2 weeks.    Please contact me for questions or concerns.      Sincerely,        Garrett Desouza MD

## 2021-10-27 NOTE — TELEPHONE ENCOUNTER
Reason for Call:  Home Health Care    Giana with [facility] called regarding (reason for call): Verbal Order    Orders are needed for this patient. Giana from [facility] called to ask if provider would follow patient for home care services? If not, who woul dfollow patient for home care services?    PT: n/a    OT: n/a    Skilled Nursing: n/a    Pt Provider: Dr. Desouza    Phone Number Homecare Nurse can be reached at: [phone]    Can we leave a detailed message on this number? YES    Phone number patient can be reached at: [place] number on file [phone] (home)    Best Time: any    Call taken on 10/27/2021 at 1:57 PM by Jose Quinn

## 2021-10-28 NOTE — PROGRESS NOTES
Hospital Follow-up Visit:    Assessment/Plan:     (M54.50) Acute midline low back pain without sciatica  (primary encounter diagnosis)  Comment:   Plan: methylPREDNISolone (MEDROL DOSEPAK) 4 MG tablet        therapy pack, Spine Referral, Lidocaine         (LIDOCARE) 4 % Patch        A taper prednisone discussed and prescribed, hold ibuprofen while taking prednisone, use topical Lidoderm patch in the back, follow with the spine clinic for long-term management. Note to be out of work was written.follow up in 2 weeks.          Subjective:     Jeromy Valdivia is a 35 year old male who presents for a hospital discharge follow up.    Following up on recent hospital admission for acute lower back pain, hospital record review medication reconciled. Was admitted from October 22 to October 24 for acute lower back pain. Was given IV medication and then switched to oral, currently taking Tylenol twice a day, ibuprofen 3 times a day, methocarbamol, pain is still there, with little movement of the back, MRI of the spine done in the hospital was reviewed. Does have a disc hernia, denies any urinary or bowel movement difficulty. 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 Is here with girlfriend. Does have a history of gout, but no recent flareup.    Hospital/Nursing Home/IP Rehab Facility: [facility]  Date of Admission:10/22  Date of Discharge:10/24  Reason(s) for Admission:Acute LBP            Do you have any problems taking your medication regularly?  None       Have you had any changes in your medication since discharge? None       Have you had any difficulty following your discharge or treatment plan?  no    Summary of hospitalization:  Hospital discharge summary reviewed  Diagnostic Tests/Treatments reviewed.  Follow up needed: None  Other Healthcare Providers Involved in Patient's Care: Patient Care Team:  Garrett Desouza MD as PCP - General (Family Medicine)      Update since discharge: {improved    Information from family, SNF, care coordination: pt     Post Discharge Medication Reconciliation yes  Plan of care communicated with: pt    Objective:     [unfilled]      Physical Exam:Physical Exam  Constitutional:       Appearance: Normal appearance.   HENT:      Head: Normocephalic and atraumatic.   Cardiovascular:      Rate and Rhythm: Normal rate and regular rhythm.   Pulmonary:      Effort: Pulmonary effort is normal.      Breath sounds: Normal breath sounds.   Musculoskeletal:      Cervical back: Normal range of motion and neck supple.      Lumbar back: Spasms and tenderness (Paraspinal muscles) present.        Back:       Comments: Unable to perform SLR because of pain   Neurological:      General: No focal deficit present.      Mental Status: He is alert and oriented to person, place, and time.   Psychiatric:         Behavior: Behavior normal.         Thought Content: Thought content normal.         Judgment: Judgment normal.           Coding guidelines for this visit:  Type of Medical   Decision Making Face-to-Face Visit       within 7 Days of discharge Face-to-Face Visit        within 14 days of discharge   Moderate Complexity [id number]   High Complexity [id number]       Electronically signed by Garrett Desouza MD 10/28/21 12:35 PM

## 2021-11-01 PROBLEM — M51.26 LUMBAR DISC HERNIATION: Status: ACTIVE | Noted: 2021-11-01

## 2021-11-02 ENCOUNTER — OFFICE VISIT (OUTPATIENT)
Dept: PHYSICAL MEDICINE AND REHAB | Facility: CLINIC | Age: 36
End: 2021-11-02
Payer: COMMERCIAL

## 2021-11-02 VITALS
DIASTOLIC BLOOD PRESSURE: 77 MMHG | BODY MASS INDEX: 33.29 KG/M2 | OXYGEN SATURATION: 96 % | HEART RATE: 112 BPM | WEIGHT: 182 LBS | SYSTOLIC BLOOD PRESSURE: 131 MMHG

## 2021-11-02 DIAGNOSIS — M54.16 LUMBAR RADICULITIS: Primary | ICD-10-CM

## 2021-11-02 DIAGNOSIS — M54.50 ACUTE MIDLINE LOW BACK PAIN WITHOUT SCIATICA: ICD-10-CM

## 2021-11-02 DIAGNOSIS — M51.26 LUMBAR DISC HERNIATION: ICD-10-CM

## 2021-11-02 DIAGNOSIS — M79.18 MYOFASCIAL PAIN: ICD-10-CM

## 2021-11-02 PROCEDURE — 99204 OFFICE O/P NEW MOD 45 MIN: CPT | Performed by: PAIN MEDICINE

## 2021-11-02 ASSESSMENT — PAIN SCALES - GENERAL: PAINLEVEL: MILD PAIN (3)

## 2021-11-02 NOTE — LETTER
11/2/2021         RE: Jeromy Valdivia  [address]  [address]  [place] [address]        Dear Colleague,    Thank you for referring your patient, Jeromy Valdivia, to the [facility] [place]. Please see a copy of my visit note below.    ASSESSMENT: Jeromy Valdivia is a 35 year old male who presents for consultation at the request of [facility] PCP Garrett Desouza, with a past medical history significant for acute midline low back pain without sciatica, lumbar disc herniation who presents today for new patient evaluation of low back pain and bilateral lower extremity pain:    -Overall patient's physical exam is reassuring that he has normal strength and reflexes in his lower extremities.  His pain is likely secondary to lumbar radiculopathy.    Patient is neurologically intact on exam. No myelopathic or red flag symptoms.     Oswestry (CHANEL) Questionnaire    OSWESTRY DISABILITY INDEX 11/2/2021   Count 10   Sum 26   Oswestry Score (%) 52     Diagnoses and all orders for this visit:  Lumbar radiculitis  -     Physical Therapy Referral; Future  Acute midline low back pain without sciatica  -     Spine Referral  Lumbar disc herniation  -     Physical Therapy Referral; Future  Myofascial pain  -     Physical Therapy Referral; Future    PLAN:  Reviewed spine anatomy and disease process. Discussed diagnosis and treatment options with the patient today. A shared decision making model was used.  The patient's values and choices were respected. The following represents what was discussed and decided upon by the provider and the patient.      -DIAGNOSTIC TESTS:  Images were personally reviewed and interpreted and explained to patient today using spine model.   --MRI of the lumbar spine dated 10/22/2021 is personally viewed images interpreted discussed with patient.  There are no findings to suggest acute fracture.  There is no high-grade stenosis or foraminal stenosis.  At L5-S1 there is a right-sided disc protrusion  likely in contact with the descending right S1 nerve root.  At L3-4 there is mild right lateral recess stenosis.  There is lumbar spondylosis.    -PHYSICAL THERAPY: I ordered physical therapy for core strengthening as well as hip  strengthening flexibility.  Like him to also show him nerve glides.  Discussed the importance of core strengthening, ROM, stretching exercises with the patient and how each of these entities is important in decreasing pain.  Explained to the patient that the purpose of physical therapy is to teach the patient a home exercise program.  These exercises need to be performed every day in order to decrease pain and prevent future occurrences of pain.        -MEDICATIONS: We discussed gabapentin, however pain is under better control at this time.  We could consider this in the future.  -  Discussed multiple medication options today with patient. Discussed risks, side effects, and proper use of medications. Patient verbalized understanding.    -INTERVENTIONS: No interventions at this time.  Discussed risks and benefits of injections with patient today.    -PATIENT EDUCATION: We discussed pain management in a multimodal fashion including physical therapy, medication management, possible future injections.    -FOLLOW-UP:   Patient will follow up in 4 weeks.    Advised patient to call the Spine Center if symptoms worsen or you have problems controlling bladder and bowel function.   ______________________________________________________________________    SUBJECTIVE:  HPI:  Jeromy Valdivia  Is a 35 year old male who presents today for new patient evaluation of low back pain.  The patient was hospitalized from 10/22/2021 through 10/24/2021 with acute low back pain.  He followed up with his primary care provider on 10/27/2021 and was referred to the spine center for further evaluation.  Patient notes that he had severe pain in his back and into his legs on both sides especially in the calves and feet.   Since being released from the hospital his pain has improved quite a bit, however is still there.  His pain today is 3/10 is worst is 10/10 is best is 3/10.  In-home physical therapy have been ordered, however since his improvement they had canceled this.  His pain is worse with prolonged walking, sitting, laying and improved with Tylenol, methocarbamol and ibuprofen.  He is here with his girlfriend who is very helpful in the planning process.  He has pain across his low back and the belt line as well as in his calves and feet.  He denies any bowel or bladder changes, fevers, chills, unintentional weight loss.  Pain is now aching in nature whereas it was very sharp in the past.  He notes this started with leaning over to pull his pants up.    -Treatment to Date: MRI of the lumbar spine dated 10/22/2021.    -Medications:    Current Outpatient Medications   Medication     acetaminophen (TYLENOL) 325 MG tablet     allopurinol (ZYLOPRIM) 100 MG tablet     famotidine (PEPCID) 20 MG tablet     ibuprofen (ADVIL/MOTRIN) 600 MG tablet     methocarbamol (ROBAXIN) 500 MG tablet     No current facility-administered medications for this visit.       Allergies   Allergen Reactions     Flagyl [Metronidazole] Hives       No past medical history on file.     Patient Active Problem List   Diagnosis     Acute midline low back pain without sciatica     Lumbar disc herniation     Past surgical history: None    Family history: His mom and uncle have had cancer.  His brother has hypertension.  Reviewed past medical, surgical, and family history with patient found on new patient intake packet located in EMR [employer] tab.     SOCIAL HX: He smokes daily.  He drinks often, he denies recreational drug use.    ROS: Specifically negative for bowel/bladder dysfunction, balance changes, headache, dizziness, foot drop, fevers, chills, appetite changes, nausea/vomiting, unexplained weight loss. Otherwise 13 systems reviewed are negative. Please see the  patient's intake questionnaire from today for details.    OBJECTIVE:  /77   Pulse 112   Wt 182 lb (82.6 kg)   SpO2 96%   BMI 33.29 kg/m      PHYSICAL EXAMINATION:    --CONSTITUTIONAL:  Vital signs as above.  No acute distress.  The patient is well nourished and well groomed.  --PSYCHIATRIC:  Appropriate mood and affect. The patient is awake, alert, oriented to person, place, time and answering questions appropriately with clear speech.    --SKIN:  Skin over the face, bilateral lower extremities, and posterior torso is clean, dry, intact without rashes.    --RESPIRATORY: Normal rhythm and effort. No abnormal accessory muscle breathing patterns noted.   --STANDING EXAMINATION:  Normal lumbar lordosis noted, no lateral shift.  --MUSCULOSKELETAL: Lumbar spine inspection reveals no evidence of deformity. Range of motion is mildly limited in lumbar flexion, extension, lateral rotation. No tenderness to palpation lumbar spine. Straight leg raising in the supine position is negative to radicular pain.   --SACROILIAC JOINT: Negative distraction.  Negative Kristy's with reproduction of pain to affected extremity.  One Finger point test negative.  --GROSS MOTOR: Gait is non-antalgic. Easily arises from a seated position. Toe walking and heel walking are normal without significant difficulty.    --LOWER EXTREMITY MOTOR TESTING:  Plantar flexion left 5/5, right 5/5   Dorsiflexion left 5/5, right 5/5   Great toe MTP extension left 5/5, right 5/5  Knee flexion left 5/5, right 5/5  Knee extension left 5/5, right 5/5   Hip flexion left 5/5, right 5/5  Hip abduction left 5/5, right 5/5  Hip adduction left 5/5, right 5/5   --HIPS: Full range of motion bilaterally.  Stinchfield test is negative bilaterally.  --NEUROLOGICAL:  2/4 patellar and achilles reflexes bilaterally.  Sensation to light touch is intact in the bilateral L4, L5, and S1 dermatomes. Babinski is negative.  --VASCULAR:  2/4 dorsalis pedis and posterior tibialsi  pulses bilaterally.  Bilateral lower extremities are warm.  There is no pitting edema of the bilateral lower extremities.    RESULTS: Prior medical records from [facility] emergency department and primary care were reviewed today.    Imaging: Lumbar spine Imaging was personally reviewed and interpreted today. The images were shown to the patient and the findings were explained using a spine model.      Lumbar spine MRI w/o contrast    Result Date: 10/22/2021  EXAM: MR LUMBAR SPINE W/O CONTRAST LOCATION: [facility] DATE/TIME: 10/22/2021 2:22 PM INDICATION: Severe low back pain. COMPARISON: None. TECHNIQUE: Routine Lumbar Spine MRI without IV contrast. FINDINGS: Nomenclature is based on five lumbar-type vertebral bodies. Vertebral body heights are unremarkable. There is no evidence of a marrow-replacing process. There is no evidence of abnormal bony edema. The imaged portions of the proximal femora, sacrum and gabe appear intact. There is mild dextroconvex curvature. Lumbar lordosis is unremarkable. The conus terminates at L1. No signal abnormalities of the visualized cord or cauda equina nerve roots are demonstrated. The paraspinal soft tissues are unremarkable. Limited images of the abdominal cavity demonstrate no acute abnormality. There is no evidence of abdominal aortic aneurysm. T12-L1: Normal disc height and signal. No herniation. Normal facets. No spinal canal or neural foraminal stenosis. L1-L2: Normal disc height and signal. No herniation. Normal facets. No spinal canal or neural foraminal stenosis. L2-L3: Normal disc height and signal. No herniation. Normal facets. No spinal canal or neural foraminal stenosis. L3-L4: Mild intervertebral disc height loss with disc desiccation. Shallow bulge and right paracentral protrusion. No significant spinal canal stenosis. Mild right lateral recess effacement. Mild facet arthropathy. No significant neural foraminal stenosis. L4-L5: Mild  intervertebral disc height loss with disc desiccation. Shallow bulge and central/left paracentral extrusion. Mild facet arthropathy. No significant spinal canal stenosis. No significant neural foraminal stenosis. L5-S1: Mild intervertebral disc height loss with disc desiccation. Broad-based disc bulging with right-sided protrusion abutting the descending S1 nerve root. No spinal canal stenosis or neural foraminal stenosis.     IMPRESSION: 1.  No findings to suggest acute fracture. 2.  No high-grade stenosis of the spinal canal or neural foramina demonstrated. 3.  At L5-S1, a right-sided protrusion contacts the descending right S1 nerve root. 4.  At L3-L4, there is mild right lateral recess stenosis. 5.  Additional mild lower lumbar spondylosis as above.        Again, thank you for allowing me to participate in the care of your patient.        Sincerely,        Sanjeev Robins, DO

## 2021-11-02 NOTE — PATIENT INSTRUCTIONS

## 2021-11-02 NOTE — PROGRESS NOTES
ASSESSMENT: Jeromy Valdivia is a 35 year old male who presents for consultation at the request of [facility] PCP Garrett Desouza, with a past medical history significant for acute midline low back pain without sciatica, lumbar disc herniation who presents today for new patient evaluation of low back pain and bilateral lower extremity pain:    -Overall patient's physical exam is reassuring that he has normal strength and reflexes in his lower extremities.  His pain is likely secondary to lumbar radiculopathy.    Patient is neurologically intact on exam. No myelopathic or red flag symptoms.     Oswestry (CHANEL) Questionnaire    OSWESTRY DISABILITY INDEX 11/2/2021   Count 10   Sum 26   Oswestry Score (%) 52     Diagnoses and all orders for this visit:  Lumbar radiculitis  -     Physical Therapy Referral; Future  Acute midline low back pain without sciatica  -     Spine Referral  Lumbar disc herniation  -     Physical Therapy Referral; Future  Myofascial pain  -     Physical Therapy Referral; Future    PLAN:  Reviewed spine anatomy and disease process. Discussed diagnosis and treatment options with the patient today. A shared decision making model was used.  The patient's values and choices were respected. The following represents what was discussed and decided upon by the provider and the patient.      -DIAGNOSTIC TESTS:  Images were personally reviewed and interpreted and explained to patient today using spine model.   --MRI of the lumbar spine dated 10/22/2021 is personally viewed images interpreted discussed with patient.  There are no findings to suggest acute fracture.  There is no high-grade stenosis or foraminal stenosis.  At L5-S1 there is a right-sided disc protrusion likely in contact with the descending right S1 nerve root.  At L3-4 there is mild right lateral recess stenosis.  There is lumbar spondylosis.    -PHYSICAL THERAPY: I ordered physical therapy for core strengthening as well as hip  strengthening  flexibility.  Like him to also show him nerve glides.  Discussed the importance of core strengthening, ROM, stretching exercises with the patient and how each of these entities is important in decreasing pain.  Explained to the patient that the purpose of physical therapy is to teach the patient a home exercise program.  These exercises need to be performed every day in order to decrease pain and prevent future occurrences of pain.        -MEDICATIONS: We discussed gabapentin, however pain is under better control at this time.  We could consider this in the future.  -  Discussed multiple medication options today with patient. Discussed risks, side effects, and proper use of medications. Patient verbalized understanding.    -INTERVENTIONS: No interventions at this time.  Discussed risks and benefits of injections with patient today.    -PATIENT EDUCATION: We discussed pain management in a multimodal fashion including physical therapy, medication management, possible future injections.    -FOLLOW-UP:   Patient will follow up in 4 weeks.    Advised patient to call the Spine Center if symptoms worsen or you have problems controlling bladder and bowel function.   ______________________________________________________________________    SUBJECTIVE:  HPI:  Jeromy Valdivia  Is a 35 year old male who presents today for new patient evaluation of low back pain.  The patient was hospitalized from 10/22/2021 through 10/24/2021 with acute low back pain.  He followed up with his primary care provider on 10/27/2021 and was referred to the spine center for further evaluation.  Patient notes that he had severe pain in his back and into his legs on both sides especially in the calves and feet.  Since being released from the hospital his pain has improved quite a bit, however is still there.  His pain today is 3/10 is worst is 10/10 is best is 3/10.  In-home physical therapy have been ordered, however since his improvement they had canceled  this.  His pain is worse with prolonged walking, sitting, laying and improved with Tylenol, methocarbamol and ibuprofen.  He is here with his girlfriend who is very helpful in the planning process.  He has pain across his low back and the belt line as well as in his calves and feet.  He denies any bowel or bladder changes, fevers, chills, unintentional weight loss.  Pain is now aching in nature whereas it was very sharp in the past.  He notes this started with leaning over to pull his pants up.    -Treatment to Date: MRI of the lumbar spine dated 10/22/2021.    -Medications:    Current Outpatient Medications   Medication     acetaminophen (TYLENOL) 325 MG tablet     allopurinol (ZYLOPRIM) 100 MG tablet     famotidine (PEPCID) 20 MG tablet     ibuprofen (ADVIL/MOTRIN) 600 MG tablet     methocarbamol (ROBAXIN) 500 MG tablet     No current facility-administered medications for this visit.       Allergies   Allergen Reactions     Flagyl [Metronidazole] Hives       No past medical history on file.     Patient Active Problem List   Diagnosis     Acute midline low back pain without sciatica     Lumbar disc herniation     Past surgical history: None    Family history: His mom and uncle have had cancer.  His brother has hypertension.  Reviewed past medical, surgical, and family history with patient found on new patient intake packet located in [employer]MR [employer] tab.     SOCIAL HX: He smokes daily.  He drinks often, he denies recreational drug use.    ROS: Specifically negative for bowel/bladder dysfunction, balance changes, headache, dizziness, foot drop, fevers, chills, appetite changes, nausea/vomiting, unexplained weight loss. Otherwise 13 systems reviewed are negative. Please see the patient's intake questionnaire from today for details.    OBJECTIVE:  /77   Pulse 112   Wt 182 lb (82.6 kg)   SpO2 96%   BMI 33.29 kg/m      PHYSICAL EXAMINATION:    --CONSTITUTIONAL:  Vital signs as above.  No acute distress.  The patient  is well nourished and well groomed.  --PSYCHIATRIC:  Appropriate mood and affect. The patient is awake, alert, oriented to person, place, time and answering questions appropriately with clear speech.    --SKIN:  Skin over the face, bilateral lower extremities, and posterior torso is clean, dry, intact without rashes.    --RESPIRATORY: Normal rhythm and effort. No abnormal accessory muscle breathing patterns noted.   --STANDING EXAMINATION:  Normal lumbar lordosis noted, no lateral shift.  --MUSCULOSKELETAL: Lumbar spine inspection reveals no evidence of deformity. Range of motion is mildly limited in lumbar flexion, extension, lateral rotation. No tenderness to palpation lumbar spine. Straight leg raising in the supine position is negative to radicular pain.   --SACROILIAC JOINT: Negative distraction.  Negative Kristy's with reproduction of pain to affected extremity.  One Finger point test negative.  --GROSS MOTOR: Gait is non-antalgic. Easily arises from a seated position. Toe walking and heel walking are normal without significant difficulty.    --LOWER EXTREMITY MOTOR TESTING:  Plantar flexion left 5/5, right 5/5   Dorsiflexion left 5/5, right 5/5   Great toe MTP extension left 5/5, right 5/5  Knee flexion left 5/5, right 5/5  Knee extension left 5/5, right 5/5   Hip flexion left 5/5, right 5/5  Hip abduction left 5/5, right 5/5  Hip adduction left 5/5, right 5/5   --HIPS: Full range of motion bilaterally.  Stinchfield test is negative bilaterally.  --NEUROLOGICAL:  2/4 patellar and achilles reflexes bilaterally.  Sensation to light touch is intact in the bilateral L4, L5, and S1 dermatomes. Babinski is negative.  --VASCULAR:  2/4 dorsalis pedis and posterior tibialsi pulses bilaterally.  Bilateral lower extremities are warm.  There is no pitting edema of the bilateral lower extremities.    RESULTS: Prior medical records from [facility] emergency department and primary care were reviewed today.    Imaging:  Lumbar spine Imaging was personally reviewed and interpreted today. The images were shown to the patient and the findings were explained using a spine model.      Lumbar spine MRI w/o contrast    Result Date: 10/22/2021  EXAM: MR LUMBAR SPINE W/O CONTRAST LOCATION: [facility] DATE/TIME: 10/22/2021 2:22 PM INDICATION: Severe low back pain. COMPARISON: None. TECHNIQUE: Routine Lumbar Spine MRI without IV contrast. FINDINGS: Nomenclature is based on five lumbar-type vertebral bodies. Vertebral body heights are unremarkable. There is no evidence of a marrow-replacing process. There is no evidence of abnormal bony edema. The imaged portions of the proximal femora, sacrum and gabe appear intact. There is mild dextroconvex curvature. Lumbar lordosis is unremarkable. The conus terminates at L1. No signal abnormalities of the visualized cord or cauda equina nerve roots are demonstrated. The paraspinal soft tissues are unremarkable. Limited images of the abdominal cavity demonstrate no acute abnormality. There is no evidence of abdominal aortic aneurysm. T12-L1: Normal disc height and signal. No herniation. Normal facets. No spinal canal or neural foraminal stenosis. L1-L2: Normal disc height and signal. No herniation. Normal facets. No spinal canal or neural foraminal stenosis. L2-L3: Normal disc height and signal. No herniation. Normal facets. No spinal canal or neural foraminal stenosis. L3-L4: Mild intervertebral disc height loss with disc desiccation. Shallow bulge and right paracentral protrusion. No significant spinal canal stenosis. Mild right lateral recess effacement. Mild facet arthropathy. No significant neural foraminal stenosis. L4-L5: Mild intervertebral disc height loss with disc desiccation. Shallow bulge and central/left paracentral extrusion. Mild facet arthropathy. No significant spinal canal stenosis. No significant neural foraminal stenosis. L5-S1: Mild intervertebral disc height  loss with disc desiccation. Broad-based disc bulging with right-sided protrusion abutting the descending S1 nerve root. No spinal canal stenosis or neural foraminal stenosis.     IMPRESSION: 1.  No findings to suggest acute fracture. 2.  No high-grade stenosis of the spinal canal or neural foramina demonstrated. 3.  At L5-S1, a right-sided protrusion contacts the descending right S1 nerve root. 4.  At L3-L4, there is mild right lateral recess stenosis. 5.  Additional mild lower lumbar spondylosis as above.

## 2021-11-11 ENCOUNTER — TELEPHONE (OUTPATIENT)
Dept: FAMILY MEDICINE | Facility: CLINIC | Age: 36
End: 2021-11-11
Payer: COMMERCIAL

## 2021-11-11 NOTE — LETTER
[facility] [address] [place] [address]  Phone: [phone]  Fax: [phone]    November 12, 2021        Jeromy Valdivia  [address]  [address]  [place] [address]          To whom it may concern:    RE: Jeromy Valdivia    Patient was seen and treated today at our clinic on 10/27/2021, he is unable to work from November 1 until November 14.    Please contact me for questions or concerns.      Sincerely,        Garrett Desouza MD

## 2021-11-11 NOTE — TELEPHONE ENCOUNTER
PT came into clinic stating that the doctors note he received from Dr. Desouza on Oct. 27. 2021 needs to be updated. His employer needs the exact dates of leave and the return date on the doctors note. Please advise and upload the new note onto [employer]. PT would like a confirmation phone call when doctors note has been updated.

## 2021-11-17 ENCOUNTER — HOSPITAL ENCOUNTER (OUTPATIENT)
Dept: PHYSICAL THERAPY | Facility: REHABILITATION | Age: 36
End: 2021-11-17
Attending: PAIN MEDICINE
Payer: COMMERCIAL

## 2021-11-17 DIAGNOSIS — M79.18 MYOFASCIAL PAIN: ICD-10-CM

## 2021-11-17 DIAGNOSIS — M62.81 GENERALIZED MUSCLE WEAKNESS: ICD-10-CM

## 2021-11-17 DIAGNOSIS — M54.16 LUMBAR RADICULITIS: Primary | ICD-10-CM

## 2021-11-17 DIAGNOSIS — M51.26 LUMBAR DISC HERNIATION: ICD-10-CM

## 2021-11-17 PROCEDURE — 97110 THERAPEUTIC EXERCISES: CPT | Mod: GP | Performed by: PHYSICAL THERAPIST

## 2021-11-17 PROCEDURE — 97140 MANUAL THERAPY 1/> REGIONS: CPT | Mod: GP | Performed by: PHYSICAL THERAPIST

## 2021-11-17 PROCEDURE — 97161 PT EVAL LOW COMPLEX 20 MIN: CPT | Mod: GP | Performed by: PHYSICAL THERAPIST

## 2021-11-17 NOTE — PROGRESS NOTES
11/17/21 0900   General Information   Type of Visit Initial OP Ortho PT Evaluation   Start of Care Date 11/17/21   Referring Physician Sanjeev Robins, DO   Patient/Family Goals Statement Strength lower back   Orders Evaluate and Treat   Date of Order 11/09/21   Certification Required? No   Medical Diagnosis Lumbar radiculitis M54.16 Lumbar disc herniation M51.26 Myofascial pain M79.18   Surgical/Medical history reviewed Yes   Precautions/Limitations no known precautions/limitations       Present No   Body Part(s)   Body Part(s) Lumbar Spine/SI   Presentation and Etiology   Pertinent history of current problem (include personal factors and/or comorbidities that impact the POC) The patient reports that when he stands too long, the muscle tenses up on the L side.  It almost feels compressed in the lower back.  He started back at work on Monday and felt it then.  On 10/29/21, he felt his pain was getting worse.  When he got up from going to the bathroom, he had extreme pain and could barely move afterwards.  He went to the ED and was able to sit in a reclined chair.  The pain had been improving until he went back to work on Monday.  Patient goes to chiropractor 3x/weeks beginning in September.   Impairments A. Pain;D. Decreased ROM;F. Decreased strength and endurance   Functional Limitations perform activities of daily living;perform required work activities   Symptom Location L side lower back and centrally, calf muscles bilaterally   How/Where did it occur From insidious onset   Onset date of current episode/exacerbation 10/22/21   Chronicity Recurrent   Pain rating (0-10 point scale) Best (/10);Worst (/10)   Best (/10) 0   Worst (/10) 10   Pain quality B. Dull;C. Aching   Frequency of pain/symptoms C. With activity   Pain/symptoms are: Worse during the day   Pain/symptoms exacerbated by D. Carrying;K. Home tasks;L. Work tasks;M. Other   Pain exacerbation comment Standing   Pain/symptoms  eased by A. Sitting;C. Rest;I. OTC medication(s);K. Other   Pain eased by comment Muscle relaxer   Progression of symptoms since onset: Improved   Current / Previous Interventions   Diagnostic Tests: MRI   MRI Results Results   MRI results Lumbar spine: 1.  No findings to suggest acute fracture.  2.  No high-grade stenosis of the spinal canal or neural foramina demonstrated.  3.  At L5-S1, a right-sided protrusion contacts the descending right S1 nerve root.  4.  At L3-L4, there is mild right lateral recess stenosis.  5.  Additional mild lower lumbar spondylosis as above.   Current Level of Function   Patient role/employment history A. Employed   Employment Comments  at [employer] environment [employer]   Fall Risk Screen   Fall screen completed by PT   Have you fallen 2 or more times in the past year? No   Have you fallen and had an injury in the past year? No   Is patient a fall risk? No   Abuse Screen (yes response referral indicated)   Feels Unsafe at Home or Work/School no   Feels Threatened by Someone no   Does Anyone Try to Keep You From Having Contact with Others or Doing Things Outside Your Home? no   Physical Signs of Abuse Present no   System Outcome Measures   Outcome Measures Low Back Pain (see Oswestry and Cynthia)  (22%)   Lumbar Spine/SI Objective Findings   Posture Mild increased lumbar lordosis   Flexion ROM To ankle with pulling on the L lower back   Extension ROM Pain in the middle lower back   Right Side Bending ROM WNL   Left Side Bending ROM Pain in the middle lower back   Lumbar ROM Comment Rotation is WNL; returning to center from L rotation mm activation   Pelvic Screen Pelvic landmarks level   Hip Screen WNL bilaterally   Hip Flexion (L2) Strength 4+   Hip Abduction Strength 4   Hip Adduction Strength 4   Knee Flexion Strength 5   Knee Extension (L3) Strength 5   Ankle Dorsiflexion (L4) Strength 5   Slump Test (+) L   Segmental Mobility Hypomobility lower lumbar spine;  pain at L5 and L5/S1 and L4/5 L facet joints   Sensation Testing Patient denies numbness/tingling   Palpation No palpatory tenderness; increased muscle tone bilateral lumbar paraspinal musculature   Planned Therapy Interventions   Planned Therapy Interventions joint mobilization;manual therapy;neuromuscular re-education;ROM;strengthening;stretching   Clinical Impression   Criteria for Skilled Therapeutic Interventions Met yes, treatment indicated   PT Diagnosis Acute on chronic lower back pain secondary to disc bulge and facet arthropathy   Influenced by the following impairments Impaired ROM, strength, mobliity, and pain   Functional limitations due to impairments Bending, twisting, turning, carrying, standing, house tasks, and work   Clinical Presentation Stable/Uncomplicated   Clinical Decision Making (Complexity) Low complexity   Therapy Frequency 1 time/week   Predicted Duration of Therapy Intervention (days/wks) 6-12   Risk & Benefits of therapy have been explained Yes   Patient, Family & other staff in agreement with plan of care Yes   Clinical Impression Comments Jeromy Valdivia is a 35 year old male who presents to PT with c/o acute on chronic lower back pain.  He has had pain for the last 2-5 years, but had sudden onset of increased pain on 10/22/21, requiring him to go to the ED.  Patient denies any known injury.  His pain is described as dull and achy, occurs with activity, and is rated 0-10/10.  Since starting muscle relaxers, patient reports overall improvements in his pain symptoms.  He is having difficulty with bending, twisting, turning, carrying heavy objects, home tasks, work, and standing.  Patient demonstrates impairments in lumbar ROM, strength, lumbar mobility, and pain.  His symptoms are most consisted with lumbar discogenic pain and facet arthropathy.  He is appropriate for skilled 1:1 physical therapy services to address the listed impairments and return to function.   ORTHO GOALS   PT Ortho Eval  Goals 1;2;3;4   Ortho Goal 1   Goal Identifier Self-management/HEP   Goal Description Patient will be independent in self-management of condition and HEP.   Target Date 01/26/22   Ortho Goal 2   Goal Identifier Bending   Goal Description Patient will be able to bend forward without pain in the lower back.   Target Date 01/26/22   Ortho Goal 3   Goal Identifier Standing   Goal Description Patient will be able to stand for 8 hour shift at work with <2/10 pain in the lower back.   Target Date 01/26/22   Ortho Goal 4   Goal Identifier House work   Goal Description Patient will be able to perform daily house work with <2/10 pain in the lower back.   Target Date 01/26/22   Total Evaluation Time   PT Eval, Low Complexity Minutes (10489) 32     Iona Velazquez, PT, DPT, A  11/17/2021

## 2021-11-24 ENCOUNTER — HOSPITAL ENCOUNTER (OUTPATIENT)
Dept: PHYSICAL THERAPY | Facility: REHABILITATION | Age: 36
End: 2021-11-24
Payer: COMMERCIAL

## 2021-11-24 DIAGNOSIS — M54.16 LUMBAR RADICULITIS: Primary | ICD-10-CM

## 2021-11-24 DIAGNOSIS — M79.18 MYOFASCIAL PAIN: ICD-10-CM

## 2021-11-24 DIAGNOSIS — M51.26 LUMBAR DISC HERNIATION: ICD-10-CM

## 2021-11-24 DIAGNOSIS — M62.81 GENERALIZED MUSCLE WEAKNESS: ICD-10-CM

## 2021-11-24 PROCEDURE — 97140 MANUAL THERAPY 1/> REGIONS: CPT | Mod: GP | Performed by: PHYSICAL THERAPIST

## 2021-11-24 PROCEDURE — 97110 THERAPEUTIC EXERCISES: CPT | Mod: GP | Performed by: PHYSICAL THERAPIST

## 2021-12-01 ENCOUNTER — HOSPITAL ENCOUNTER (OUTPATIENT)
Dept: PHYSICAL THERAPY | Facility: REHABILITATION | Age: 36
End: 2021-12-01
Payer: COMMERCIAL

## 2021-12-01 DIAGNOSIS — M51.26 LUMBAR DISC HERNIATION: ICD-10-CM

## 2021-12-01 DIAGNOSIS — M79.18 MYOFASCIAL PAIN: ICD-10-CM

## 2021-12-01 DIAGNOSIS — M62.81 GENERALIZED MUSCLE WEAKNESS: ICD-10-CM

## 2021-12-01 DIAGNOSIS — M54.16 LUMBAR RADICULITIS: Primary | ICD-10-CM

## 2021-12-01 PROCEDURE — 97110 THERAPEUTIC EXERCISES: CPT | Mod: GP | Performed by: PHYSICAL THERAPIST

## 2021-12-08 ENCOUNTER — HOSPITAL ENCOUNTER (OUTPATIENT)
Dept: PHYSICAL THERAPY | Facility: REHABILITATION | Age: 36
End: 2021-12-08

## 2021-12-08 DIAGNOSIS — M62.81 GENERALIZED MUSCLE WEAKNESS: ICD-10-CM

## 2021-12-08 DIAGNOSIS — M51.26 LUMBAR DISC HERNIATION: ICD-10-CM

## 2021-12-08 DIAGNOSIS — M79.18 MYOFASCIAL PAIN: ICD-10-CM

## 2021-12-08 DIAGNOSIS — M54.16 LUMBAR RADICULITIS: Primary | ICD-10-CM

## 2021-12-08 PROCEDURE — 97140 MANUAL THERAPY 1/> REGIONS: CPT | Mod: GP | Performed by: PHYSICAL THERAPIST

## 2021-12-08 PROCEDURE — 97110 THERAPEUTIC EXERCISES: CPT | Mod: GP | Performed by: PHYSICAL THERAPIST

## 2021-12-15 ENCOUNTER — HOSPITAL ENCOUNTER (OUTPATIENT)
Dept: PHYSICAL THERAPY | Facility: REHABILITATION | Age: 36
End: 2021-12-15
Payer: COMMERCIAL

## 2021-12-15 DIAGNOSIS — M79.18 MYOFASCIAL PAIN: ICD-10-CM

## 2021-12-15 DIAGNOSIS — M54.16 LUMBAR RADICULITIS: Primary | ICD-10-CM

## 2021-12-15 DIAGNOSIS — M62.81 GENERALIZED MUSCLE WEAKNESS: ICD-10-CM

## 2021-12-15 DIAGNOSIS — M51.26 LUMBAR DISC HERNIATION: ICD-10-CM

## 2021-12-15 PROCEDURE — 97140 MANUAL THERAPY 1/> REGIONS: CPT | Mod: GP | Performed by: PHYSICAL THERAPIST

## 2021-12-15 PROCEDURE — 97110 THERAPEUTIC EXERCISES: CPT | Mod: GP | Performed by: PHYSICAL THERAPIST

## 2021-12-15 NOTE — ADDENDUM NOTE
Encounter addended by: Aniya Pimentel, PT on: 12/15/2021 4:30 PM   Actions taken: Flowsheet accepted

## 2021-12-22 ENCOUNTER — HOSPITAL ENCOUNTER (OUTPATIENT)
Dept: PHYSICAL THERAPY | Facility: REHABILITATION | Age: 36
End: 2021-12-22
Payer: COMMERCIAL

## 2021-12-22 DIAGNOSIS — M79.18 MYOFASCIAL PAIN: ICD-10-CM

## 2021-12-22 DIAGNOSIS — M54.16 LUMBAR RADICULITIS: Primary | ICD-10-CM

## 2021-12-22 DIAGNOSIS — M62.81 GENERALIZED MUSCLE WEAKNESS: ICD-10-CM

## 2021-12-22 DIAGNOSIS — M51.26 LUMBAR DISC HERNIATION: ICD-10-CM

## 2021-12-22 PROCEDURE — 97110 THERAPEUTIC EXERCISES: CPT | Mod: GP | Performed by: PHYSICAL THERAPIST

## 2021-12-22 PROCEDURE — 97140 MANUAL THERAPY 1/> REGIONS: CPT | Mod: GP | Performed by: PHYSICAL THERAPIST

## 2021-12-22 NOTE — PROGRESS NOTES
[facility]    Outpatient Physical Therapy Discharge Note  Patient: Jeromy Valdivia  : 1985    Beginning/End Dates of Reporting Period:  21 to 21    Referring Provider: Sanjeev Robins DO Therapy Diagnosis: Acute on chronic lower back pain secondary to disc bulge and facet arthropathy     Client Self Report: The patient reports that his pain has been better this week.  His pain can be just a little achy.  He can stand for about 4-5 hours before it starts to bother him.  Exercises are going well without any problems.    Objective Measurements:  Objective Measure: Lumbar ROM  Details: Flexion to ankles; appears WNL all planes  Objective Measure: Quadrant  Details: (-) quadrant test bilaterally    Outcome Measures (most recent score):  CHANEL: 4%    Goals:  Goal Identifier Self-management/HEP   Goal Description Patient will be independent in self-management of condition and HEP.   Target Date 22   Date Met  21   Progress (detail required for progress note): Met     Goal Identifier Bending   Goal Description Patient will be able to bend forward without pain in the lower back.   Target Date 22   Date Met  21   Progress (detail required for progress note): Met     Goal Identifier Standing   Goal Description Patient will be able to stand for 8 hour shift at work with <2/10 pain in the lower back.   Target Date 22   Date Met  21   Progress (detail required for progress note): 4-5 hours without pain; Almost Met     Goal Identifier House work   Goal Description Patient will be able to perform daily house work with <2/10 pain in the lower back.   Target Date 22   Date Met  12/15/21   Progress (detail required for progress note): Met     Assessment:  The patient presents to PT for follow up visit.  His back pain is significantly improved since beginning PT and he  has met 3/4 PT goals, having almost met his last goal.  He no longer has a (+) quadrant test and is independent with his HEP.  He is appropriate for discharge from skilled PT services at this time.    Plan:  Discharge from therapy.    Discharge:    Reason for Discharge: Patient has met all goals.    Equipment Issued: Resistance band.    Discharge Plan: Patient to continue home program.    Iona Velazquez, PT, DPT, A  12/22/2021

## 2022-01-27 DIAGNOSIS — E79.0 HYPERURICEMIA: Primary | ICD-10-CM

## 2022-01-27 NOTE — TELEPHONE ENCOUNTER
Reason for Call:  Medication or medication refill:    Do you use a [facility]?  Name of the pharmacy and phone number for the current request:  [facility] pharmacy in [place]    Name of the medication requested: allopurinol (ZYLOPRIM) 100 MG tablet    Other request: Patient called to request provider to send in refills for this medication. Please send refills to pharmacy.    Can we leave a detailed message on this number? YES    Phone number patient can be reached at: Home number on file [phone] (home)    Best Time: any    Call taken on 1/27/2022 at 4:48 PM by Jose Quinn

## 2022-01-30 NOTE — TELEPHONE ENCOUNTER
"Routing refill request to provider for review/approval because:  Outpatient Medication Detail     Disp Refills Start End SHREYA   allopurinol (ZYLOPRIM) 100 MG tablet   11/19/2020  --   Sig - Route: Take 100 mg by mouth daily - Oral   Class: Historical   Order: [id number]     Labs not current:  Uric acid  Medication is reported/historical    Last office visit provider:  10/27/21     Requested Prescriptions   Pending Prescriptions Disp Refills     allopurinol (ZYLOPRIM) 100 MG tablet       Sig: Take 1 tablet (100 mg) by mouth daily       Gout Agents Protocol Failed - 1/27/2022  4:50 PM        Failed - Has Uric Acid on file in past 12 months and value is less than 6     No lab results found.  If level is 6mg/dL or greater, ok to refill one time and refer to provider.           Passed - CBC on file in past 12 months     Recent Labs   Lab Test 10/23/21  0802   WBC 7.8   RBC 5.00   HGB 15.2   HCT 46.0                    Passed - ALT on file in past 12 months     Recent Labs   Lab Test 08/11/21  1549   ALT 36             Passed - Recent (12 mo) or future (30 days) visit within the authorizing provider's specialty     Patient has had an office visit with the authorizing provider or a provider within the authorizing providers department within the previous 12 mos or has a future within next 30 days. See \"Patient Info\" tab in inbasket, or \"Choose Columns\" in Meds & Orders section of the refill encounter.              Passed - Medication is active on med list        Passed - Patient is age 18 or older        Passed - Normal serum creatinine on file in the past 12 months     Recent Labs   Lab Test 10/23/21  0802   CR 0.79       Ok to refill medication if creatinine is low               Andrei Mcgraw RN 01/30/22 8:09 AM  "

## 2022-01-31 RX ORDER — ALLOPURINOL 100 MG/1
100 TABLET ORAL DAILY
Qty: 90 TABLET | Refills: 0 | Status: SHIPPED | OUTPATIENT
Start: 2022-01-31 | End: 2022-05-16

## 2022-05-16 ENCOUNTER — VIRTUAL VISIT (OUTPATIENT)
Dept: FAMILY MEDICINE | Facility: CLINIC | Age: 37
End: 2022-05-16
Payer: COMMERCIAL

## 2022-05-16 DIAGNOSIS — E79.0 HYPERURICEMIA: Primary | ICD-10-CM

## 2022-05-16 DIAGNOSIS — Z13.220 SCREENING FOR HYPERLIPIDEMIA: ICD-10-CM

## 2022-05-16 DIAGNOSIS — Z11.4 SCREENING FOR HIV (HUMAN IMMUNODEFICIENCY VIRUS): ICD-10-CM

## 2022-05-16 DIAGNOSIS — Z11.59 NEED FOR HEPATITIS C SCREENING TEST: ICD-10-CM

## 2022-05-16 PROBLEM — M10.9 GOUT INVOLVING TOE OF LEFT FOOT: Status: ACTIVE | Noted: 2020-06-09

## 2022-05-16 PROCEDURE — 99213 OFFICE O/P EST LOW 20 MIN: CPT | Mod: 95 | Performed by: FAMILY MEDICINE

## 2022-05-16 RX ORDER — ALLOPURINOL 100 MG/1
100 TABLET ORAL DAILY
Qty: 90 TABLET | Refills: 3 | Status: SHIPPED | OUTPATIENT
Start: 2022-05-16 | End: 2023-02-17

## 2022-05-16 RX ORDER — ALLOPURINOL 100 MG/1
1 TABLET ORAL DAILY
COMMUNITY
Start: 2020-11-19 | End: 2023-06-18

## 2022-05-16 NOTE — PROGRESS NOTES
"Jeromy is a 36 year old who is being evaluated via a billable telephone visit.      What phone number would you like to be contacted at? [phone]   How would you like to obtain your AVS? MyChart    Assessment & Plan     Hyperuricemia    - allopurinol (ZYLOPRIM) 100 MG tablet; Take 1 tablet (100 mg) by mouth daily  - **Uric acid FUTURE 2mo; Future  - Comprehensive metabolic panel (BMP + Alb, Alk Phos, ALT, AST, Total. Bili, TP); Future    Screening for HIV (human immunodeficiency virus)    - HIV Antigen Antibody Combo; Future    Need for hepatitis C screening test    - Hepatitis C Screen Reflex to HCV RNA Quant and Genotype; Future    Screening for hyperlipidemia    - Lipid panel reflex to direct LDL Fasting; Future    Chronic gout, management discussed with healthy lifestyle changes, will check baseline uric acid keep it below 5, continue allopurinol 100 mg daily, adjust dose based on symptoms and uric acid level.  Age-appropriate screening including hep C HIV order discussed and placed.    Review of external notes as documented elsewhere in note  12 minutes spent on the date of the encounter doing chart review, review of outside records, review of test results, interpretation of tests, patient visit and documentation        Tobacco Cessation:   reports that he has been smoking. He uses smokeless tobacco.  Tobacco Cessation Action Plan: Self help information given to patient    BMI:   Estimated body mass index is 33.29 kg/m  as calculated from the following:    Height as of 10/22/21: 1.575 m (5' 2\").    Weight as of 11/2/21: 82.6 kg (182 lb).       MEDICATIONS:  Continue current medications without change    No follow-ups on file.    Garrett Desouza MD  [facility]    Subjective   Jeromy is a 36 year old who presents for the following health issues   Chronic gout, managed with allopurinol 100 mg daily, overall tolerating well, no adverse reaction, patient  tried to stop the medication, " usually get flare up involving the knees,  Feet and toes.  Would like a refill of allopurinol.  HPI   There were no vitals taken for this visit.          Review of Systems   Constitutional, HEENT, cardiovascular, pulmonary, gi and gu systems are negative, except as otherwise noted.      Objective           Vitals:  No vitals were obtained today due to virtual visit.    Physical Exam   healthy, alert and no distress  PSYCH: Alert and oriented times 3; coherent speech, normal   rate and volume, able to articulate logical thoughts, able   to abstract reason, no tangential thoughts, no hallucinations   or delusions  His affect is normal  RESP: No cough, no audible wheezing, able to talk in full sentences  Remainder of exam unable to be completed due to telephone visits                Phone call duration: 12 minutes

## 2022-08-20 ENCOUNTER — HOSPITAL ENCOUNTER (EMERGENCY)
Facility: HOSPITAL | Age: 37
Discharge: HOME | End: 2022-08-20
Attending: FAMILY MEDICINE | Admitting: FAMILY MEDICINE
Payer: COMMERCIAL

## 2022-08-20 ENCOUNTER — APPOINTMENT (OUTPATIENT)
Dept: CT IMAGING | Facility: HOSPITAL | Age: 37
End: 2022-08-20
Attending: FAMILY MEDICINE
Payer: COMMERCIAL

## 2022-08-20 VITALS
BODY MASS INDEX: 34.55 KG/M2 | HEIGHT: 63 IN | SYSTOLIC BLOOD PRESSURE: 126 MMHG | WEIGHT: 195 LBS | OXYGEN SATURATION: 99 % | RESPIRATION RATE: 18 BRPM | HEART RATE: 90 BPM | DIASTOLIC BLOOD PRESSURE: 83 MMHG | TEMPERATURE: 98.1 F

## 2022-08-20 DIAGNOSIS — N20.1 RIGHT URETERAL STONE: ICD-10-CM

## 2022-08-20 DIAGNOSIS — M54.50 ACUTE MIDLINE LOW BACK PAIN WITHOUT SCIATICA: ICD-10-CM

## 2022-08-20 LAB
ALBUMIN UR-MCNC: NEGATIVE MG/DL
ANION GAP SERPL CALCULATED.3IONS-SCNC: 8 MMOL/L (ref 5–18)
APPEARANCE UR: CLEAR
BASOPHILS # BLD AUTO: 0.1 10E3/UL (ref 0–0.2)
BASOPHILS NFR BLD AUTO: 1 %
BILIRUB UR QL STRIP: NEGATIVE
BUN SERPL-MCNC: 13 MG/DL (ref 8–22)
C REACTIVE PROTEIN LHE: 0.1 MG/DL (ref 0–?)
CALCIUM SERPL-MCNC: 8.9 MG/DL (ref 8.5–10.5)
CHLORIDE BLD-SCNC: 109 MMOL/L (ref 98–107)
CO2 SERPL-SCNC: 23 MMOL/L (ref 22–31)
COLOR UR AUTO: ABNORMAL
CREAT SERPL-MCNC: 1.02 MG/DL (ref 0.7–1.3)
EOSINOPHIL # BLD AUTO: 0.1 10E3/UL (ref 0–0.7)
EOSINOPHIL NFR BLD AUTO: 1 %
ERYTHROCYTE [DISTWIDTH] IN BLOOD BY AUTOMATED COUNT: 12.7 % (ref 10–15)
GFR SERPL CREATININE-BSD FRML MDRD: >90 ML/MIN/1.73M2
GLUCOSE BLD-MCNC: 111 MG/DL (ref 70–125)
GLUCOSE UR STRIP-MCNC: NEGATIVE MG/DL
HCT VFR BLD AUTO: 46.9 % (ref 40–53)
HGB BLD-MCNC: 15.6 G/DL (ref 13.3–17.7)
HGB UR QL STRIP: ABNORMAL
IMM GRANULOCYTES # BLD: 0.1 10E3/UL
IMM GRANULOCYTES NFR BLD: 1 %
KETONES UR STRIP-MCNC: NEGATIVE MG/DL
LEUKOCYTE ESTERASE UR QL STRIP: NEGATIVE
LYMPHOCYTES # BLD AUTO: 2.1 10E3/UL (ref 0.8–5.3)
LYMPHOCYTES NFR BLD AUTO: 25 %
MCH RBC QN AUTO: 30.2 PG (ref 26.5–33)
MCHC RBC AUTO-ENTMCNC: 33.3 G/DL (ref 31.5–36.5)
MCV RBC AUTO: 91 FL (ref 78–100)
MONOCYTES # BLD AUTO: 0.6 10E3/UL (ref 0–1.3)
MONOCYTES NFR BLD AUTO: 7 %
NEUTROPHILS # BLD AUTO: 5.5 10E3/UL (ref 1.6–8.3)
NEUTROPHILS NFR BLD AUTO: 65 %
NITRATE UR QL: NEGATIVE
NRBC # BLD AUTO: 0 10E3/UL
NRBC BLD AUTO-RTO: 0 /100
PH UR STRIP: 5.5 [PH] (ref 5–7)
PLATELET # BLD AUTO: 254 10E3/UL (ref 150–450)
POTASSIUM BLD-SCNC: 3.9 MMOL/L (ref 3.5–5)
RBC # BLD AUTO: 5.16 10E6/UL (ref 4.4–5.9)
RBC URINE: 36 /HPF
SARS-COV-2 RNA RESP QL NAA+PROBE: NEGATIVE
SODIUM SERPL-SCNC: 140 MMOL/L (ref 136–145)
SP GR UR STRIP: 1.02 (ref 1–1.03)
SQUAMOUS EPITHELIAL: <1 /HPF
UROBILINOGEN UR STRIP-MCNC: <2 MG/DL
WBC # BLD AUTO: 8.3 10E3/UL (ref 4–11)
WBC URINE: 1 /HPF

## 2022-08-20 PROCEDURE — C9803 HOPD COVID-19 SPEC COLLECT: HCPCS

## 2022-08-20 PROCEDURE — 96375 TX/PRO/DX INJ NEW DRUG ADDON: CPT

## 2022-08-20 PROCEDURE — 85025 COMPLETE CBC W/AUTO DIFF WBC: CPT | Performed by: FAMILY MEDICINE

## 2022-08-20 PROCEDURE — U0003 INFECTIOUS AGENT DETECTION BY NUCLEIC ACID (DNA OR RNA); SEVERE ACUTE RESPIRATORY SYNDROME CORONAVIRUS 2 (SARS-COV-2) (CORONAVIRUS DISEASE [COVID-19]), AMPLIFIED PROBE TECHNIQUE, MAKING USE OF HIGH THROUGHPUT TECHNOLOGIES AS DESCRIBED BY CMS-2020-01-R: HCPCS | Performed by: FAMILY MEDICINE

## 2022-08-20 PROCEDURE — 36415 COLL VENOUS BLD VENIPUNCTURE: CPT | Performed by: FAMILY MEDICINE

## 2022-08-20 PROCEDURE — 81001 URINALYSIS AUTO W/SCOPE: CPT | Performed by: FAMILY MEDICINE

## 2022-08-20 PROCEDURE — 250N000011 HC RX IP 250 OP 636: Performed by: FAMILY MEDICINE

## 2022-08-20 PROCEDURE — 74176 CT ABD & PELVIS W/O CONTRAST: CPT

## 2022-08-20 PROCEDURE — 99285 EMERGENCY DEPT VISIT HI MDM: CPT | Mod: CS,25

## 2022-08-20 PROCEDURE — 80048 BASIC METABOLIC PNL TOTAL CA: CPT | Performed by: FAMILY MEDICINE

## 2022-08-20 PROCEDURE — 96374 THER/PROPH/DIAG INJ IV PUSH: CPT

## 2022-08-20 PROCEDURE — 250N000013 HC RX MED GY IP 250 OP 250 PS 637: Performed by: FAMILY MEDICINE

## 2022-08-20 PROCEDURE — 86140 C-REACTIVE PROTEIN: CPT | Performed by: FAMILY MEDICINE

## 2022-08-20 RX ORDER — ONDANSETRON 2 MG/ML
4 INJECTION INTRAMUSCULAR; INTRAVENOUS ONCE
Status: COMPLETED | OUTPATIENT
Start: 2022-08-20 | End: 2022-08-20

## 2022-08-20 RX ORDER — ACETAMINOPHEN 325 MG/1
975 TABLET ORAL ONCE
Status: COMPLETED | OUTPATIENT
Start: 2022-08-20 | End: 2022-08-20

## 2022-08-20 RX ORDER — DIMENHYDRINATE 50 MG
50 TABLET ORAL EVERY 6 HOURS PRN
Qty: 28 TABLET | Refills: 0 | Status: SHIPPED | OUTPATIENT
Start: 2022-08-20 | End: 2022-08-27

## 2022-08-20 RX ORDER — ACETAMINOPHEN 500 MG
1000 TABLET ORAL EVERY 6 HOURS
Qty: 56 TABLET | Refills: 0 | Status: SHIPPED | OUTPATIENT
Start: 2022-08-20 | End: 2022-08-27

## 2022-08-20 RX ORDER — OXYCODONE HYDROCHLORIDE 5 MG/1
5 TABLET ORAL EVERY 6 HOURS PRN
Qty: 8 TABLET | Refills: 0 | Status: SHIPPED | OUTPATIENT
Start: 2022-08-20 | End: 2022-08-24

## 2022-08-20 RX ORDER — IBUPROFEN 200 MG
400 TABLET ORAL EVERY 6 HOURS
Qty: 56 TABLET | Refills: 0 | Status: SHIPPED | OUTPATIENT
Start: 2022-08-20 | End: 2022-08-27

## 2022-08-20 RX ORDER — KETOROLAC TROMETHAMINE 15 MG/ML
15 INJECTION, SOLUTION INTRAMUSCULAR; INTRAVENOUS ONCE
Status: COMPLETED | OUTPATIENT
Start: 2022-08-20 | End: 2022-08-20

## 2022-08-20 RX ADMIN — Medication 50 MG: at 21:38

## 2022-08-20 RX ADMIN — ONDANSETRON 4 MG: 2 INJECTION INTRAMUSCULAR; INTRAVENOUS at 21:47

## 2022-08-20 RX ADMIN — KETOROLAC TROMETHAMINE 15 MG: 15 INJECTION, SOLUTION INTRAMUSCULAR; INTRAVENOUS at 21:45

## 2022-08-20 RX ADMIN — ACETAMINOPHEN 975 MG: 325 TABLET ORAL at 21:39

## 2022-08-20 ASSESSMENT — ACTIVITIES OF DAILY LIVING (ADL): ADLS_ACUITY_SCORE: 33

## 2022-08-21 NOTE — ED TRIAGE NOTES
Pt complains of flank pain that radiates down to his lower abdomen and groin. Pt denies urinary symptoms. Pt reports testicles are not swollen.      Triage Assessment     Row Name 08/20/22 2041       Triage Assessment (Adult)    Airway WDL WDL       Respiratory WDL    Respiratory WDL WDL       Skin Circulation/Temperature WDL    Skin Circulation/Temperature WDL WDL       Cardiac WDL    Cardiac WDL WDL       Peripheral/Neurovascular WDL    Peripheral Neurovascular WDL WDL       Cognitive/Neuro/Behavioral WDL    Cognitive/Neuro/Behavioral WDL WDL

## 2022-08-21 NOTE — ED PROVIDER NOTES
EMERGENCY DEPARTMENT ENCOUNTER      NAME: Jeromy Valdivia  AGE: 36 year old male  YOB: 1985  MRN: [id number]  EVALUATION DATE & TIME: 8/20/2022  8:44 PM    PCP: Garrett Desouza    ED PROVIDER: Gurinder Keane M.D.    Chief Complaint   Patient presents with     Back Pain     Flank Pain       FINAL IMPRESSION:  1. Acute midline low back pain without sciatica    2. Right ureteral stone        ED COURSE & MEDICAL DECISION MAKING:    Pertinent Labs & Imaging studies personally reviewed and interpreted by me. (See chart for details)   Patient seen and examined, prior records reviewed.  Differential diagnosis includes but not limited to pyelonephritis, ureteral stone, aortic dissection, aortic aneurysm, musculoskeletal pain, disc herniation.  Patient presents with about an hour of right flank pain rating to the right lower quadrant and right scrotum.  No prior history of this.  On exam, appears uncomfortable with no right lower quadrant tenderness and no CVA tenderness.  Symptoms are most consistent with ureteral stone likely distal.  Labs and CT scan are ordered.  Toradol, Tylenol, Dramamine, Zofran ordered for symptom management.  10:09 PM CT scan personally reviewed and interpreted by me demonstrates a small right UVJ stone consistent with patient's symptoms.  Urinalysis with trace blood.  Remaining labs are pending.  Plan to discharge if pain is well controlled as stone is small and likely will pass.  Referral to [facility] is sent.    At the conclusion of the encounter I discussed the results of all of the tests and the disposition. The questions were answered. The patient or family acknowledged understanding and was agreeable with the care plan.     PROCEDURES:   Procedures    MEDICATIONS GIVEN IN THE EMERGENCY:  Medications   ketorolac (TORADOL) injection 15 mg (15 mg Intravenous Given 8/20/22 2145)   acetaminophen (TYLENOL) tablet 975 mg (975 mg Oral Given 8/20/22 2139)   dimenhyDRINATE chew tab 50 mg (50 mg  Oral Given 8/20/22 2138)   ondansetron (ZOFRAN) injection 4 mg (4 mg Intravenous Given 8/20/22 2147)       NEW PRESCRIPTIONS STARTED AT TODAY'S ER VISIT  New Prescriptions    ACETAMINOPHEN (TYLENOL) 500 MG TABLET    Take 2 tablets (1,000 mg) by mouth every 6 hours for 7 days    DIMENHYDRINATE (DRAMAMINE) 50 MG TABLET    Take 1 tablet (50 mg) by mouth every 6 hours as needed for other (kidney stone pain management)    IBUPROFEN (ADVIL/MOTRIN) 200 MG TABLET    Take 2 tablets (400 mg) by mouth every 6 hours for 7 days    OXYCODONE (ROXICODONE) 5 MG TABLET    Take 1 tablet (5 mg) by mouth every 6 hours as needed for severe pain If pain is not improved with acetaminophen and ibuprofen.       =================================================================    HPI    Patient information was obtained from: Patient      Jeromy Valdivia is a 36 year old male with a pertinent history of back pain who presents to this ED for evaluation of right-sided flank pain and abdominal pain that started about an hour prior to coming emergency department.  Pain is constant, no relieving or exacerbating factors.  He took ibuprofen at home with no relief.  Does radiate down into the testicle.  Feels like he needs to urinate but unable to do so.  No blood in the urine, no dysuria up to this point.  No radiation into the leg.      REVIEW OF SYSTEMS   Review of Systems   All other systems reviewed and negative    PAST MEDICAL HISTORY:  No past medical history on file.    PAST SURGICAL HISTORY:  No past surgical history on file.    CURRENT MEDICATIONS:    No current facility-administered medications for this encounter.     Current Outpatient Medications   Medication     acetaminophen (TYLENOL) 500 MG tablet     dimenhyDRINATE (DRAMAMINE) 50 MG tablet     ibuprofen (ADVIL/MOTRIN) 200 MG tablet     oxyCODONE (ROXICODONE) 5 MG tablet     allopurinol (ZYLOPRIM) 100 MG tablet     allopurinol (ZYLOPRIM) 100 MG tablet       ALLERGIES:  Allergies   Allergen  "Reactions     Flagyl [Metronidazole] Hives       FAMILY HISTORY:  No family history on file.    SOCIAL HISTORY:   Social History     Socioeconomic History     Marital status: Single   Tobacco Use     Smoking status: Current Some Day Smoker     Smokeless tobacco: Current User   Substance and Sexual Activity     Alcohol use: Yes     Drug use: Never       VITALS:  BP (!) 135/94   Pulse 90   Temp 98.1  F (36.7  C) (Temporal)   Resp 18   Ht 1.6 m (5' 3\")   Wt 88.5 kg (195 lb)   SpO2 99%   BMI 34.54 kg/m      PHYSICAL EXAM:  Physical Exam  Vitals and nursing note reviewed.   Constitutional:       Appearance: Normal appearance.      Comments: Appears uncomfortable   HENT:      Head: Normocephalic and atraumatic.      Right Ear: External ear normal.      Left Ear: External ear normal.      Nose: Nose normal.      Mouth/Throat:      Mouth: Mucous membranes are moist.   Eyes:      Extraocular Movements: Extraocular movements intact.      Conjunctiva/sclera: Conjunctivae normal.      Pupils: Pupils are equal, round, and reactive to light.   Cardiovascular:      Rate and Rhythm: Normal rate and regular rhythm.   Pulmonary:      Effort: Pulmonary effort is normal.      Breath sounds: Normal breath sounds. No wheezing or rales.   Abdominal:      General: Abdomen is flat. There is no distension.      Palpations: Abdomen is soft.      Tenderness: There is no abdominal tenderness. There is no guarding.   Musculoskeletal:         General: Normal range of motion.      Cervical back: Normal range of motion and neck supple.      Right lower leg: No edema.      Left lower leg: No edema.   Lymphadenopathy:      Cervical: No cervical adenopathy.   Skin:     General: Skin is warm and dry.   Neurological:      General: No focal deficit present.      Mental Status: He is alert and oriented to person, place, and time. Mental status is at baseline.      Comments: No gross focal neurologic deficits   Psychiatric:         Mood and Affect: " Mood normal.         Behavior: Behavior normal.         Thought Content: Thought content normal.          LAB:  All pertinent labs reviewed and interpreted.  Results for orders placed or performed during the hospital encounter of 08/20/22   CT Abdomen Pelvis w/o Contrast    Impression    IMPRESSION:   1.  1.5 mm stone distal right ureter at the UVJ with mild right-sided hydronephrosis.    2.  No appendicitis.    3.  Fatty liver.     UA with Microscopic reflex to Culture    Specimen: Urine, Clean Catch   Result Value Ref Range    Color Urine Light Yellow Colorless, Straw, Light Yellow, Yellow    Appearance Urine Clear Clear    Glucose Urine Negative Negative mg/dL    Bilirubin Urine Negative Negative    Ketones Urine Negative Negative mg/dL    Specific Gravity Urine 1.018 1.001 - 1.030    Blood Urine 0.1 mg/dL (A) Negative    pH Urine 5.5 5.0 - 7.0    Protein Albumin Urine Negative Negative mg/dL    Urobilinogen Urine <2.0 <2.0 mg/dL    Nitrite Urine Negative Negative    Leukocyte Esterase Urine Negative Negative    RBC Urine 36 (H) <=2 /HPF    WBC Urine 1 <=5 /HPF    Squamous Epithelials Urine <1 <=1 /HPF   Basic metabolic panel   Result Value Ref Range    Sodium 140 136 - 145 mmol/L    Potassium 3.9 3.5 - 5.0 mmol/L    Chloride 109 (H) 98 - 107 mmol/L    Carbon Dioxide (CO2) 23 22 - 31 mmol/L    Anion Gap 8 5 - 18 mmol/L    Urea Nitrogen 13 8 - 22 mg/dL    Creatinine 1.02 0.70 - 1.30 mg/dL    Calcium 8.9 8.5 - 10.5 mg/dL    Glucose 111 70 - 125 mg/dL    GFR Estimate >90 >60 mL/min/1.73m2   CRP inflammation   Result Value Ref Range    CRP 0.1 0.0 - <0.8 mg/dL       RADIOLOGY:  Reviewed all pertinent imaging. Please see official radiology report.  CT Abdomen Pelvis w/o Contrast   Preliminary Result   IMPRESSION:    1.  1.5 mm stone distal right ureter at the UVJ with mild right-sided hydronephrosis.      2.  No appendicitis.      3.  Fatty liver.           Gurinder Keane M.D.  Emergency  Medicine  [facility] EMERGENCY DEPARTMENT  [facility] [facility] [address] [phone]  Dept: [phone]     Gurinder Keane MD  08/20/22 0320

## 2022-10-03 ENCOUNTER — HEALTH MAINTENANCE LETTER (OUTPATIENT)
Age: 37
End: 2022-10-03

## 2023-02-11 ENCOUNTER — HOSPITAL ENCOUNTER (EMERGENCY)
Facility: HOSPITAL | Age: 38
Discharge: HOME | End: 2023-02-11
Attending: EMERGENCY MEDICINE | Admitting: EMERGENCY MEDICINE
Payer: COMMERCIAL

## 2023-02-11 VITALS
SYSTOLIC BLOOD PRESSURE: 134 MMHG | WEIGHT: 200 LBS | HEIGHT: 62 IN | RESPIRATION RATE: 18 BRPM | DIASTOLIC BLOOD PRESSURE: 85 MMHG | TEMPERATURE: 98 F | HEART RATE: 108 BPM | OXYGEN SATURATION: 99 % | BODY MASS INDEX: 36.8 KG/M2

## 2023-02-11 DIAGNOSIS — S39.012A BACK STRAIN, INITIAL ENCOUNTER: ICD-10-CM

## 2023-02-11 DIAGNOSIS — M62.830 BACK MUSCLE SPASM: ICD-10-CM

## 2023-02-11 PROCEDURE — 250N000013 HC RX MED GY IP 250 OP 250 PS 637: Performed by: EMERGENCY MEDICINE

## 2023-02-11 PROCEDURE — 99284 EMERGENCY DEPT VISIT MOD MDM: CPT

## 2023-02-11 RX ORDER — CYCLOBENZAPRINE HCL 10 MG
10 TABLET ORAL ONCE
Status: COMPLETED | OUTPATIENT
Start: 2023-02-11 | End: 2023-02-11

## 2023-02-11 RX ORDER — OXYCODONE HYDROCHLORIDE 5 MG/1
5 TABLET ORAL ONCE
Status: COMPLETED | OUTPATIENT
Start: 2023-02-11 | End: 2023-02-11

## 2023-02-11 RX ORDER — CYCLOBENZAPRINE HCL 10 MG
10 TABLET ORAL 3 TIMES DAILY PRN
Qty: 18 TABLET | Refills: 0 | Status: SHIPPED | OUTPATIENT
Start: 2023-02-11 | End: 2023-02-14

## 2023-02-11 RX ORDER — OXYCODONE HYDROCHLORIDE 5 MG/1
5 TABLET ORAL EVERY 6 HOURS PRN
Qty: 12 TABLET | Refills: 0 | Status: SHIPPED | OUTPATIENT
Start: 2023-02-11 | End: 2023-02-14

## 2023-02-11 RX ORDER — METHYLPREDNISOLONE 4 MG
TABLET, DOSE PACK ORAL
Qty: 21 TABLET | Refills: 0 | Status: SHIPPED | OUTPATIENT
Start: 2023-02-11 | End: 2023-06-06

## 2023-02-11 RX ADMIN — OXYCODONE HYDROCHLORIDE 5 MG: 5 TABLET ORAL at 15:59

## 2023-02-11 RX ADMIN — CYCLOBENZAPRINE 10 MG: 10 TABLET, FILM COATED ORAL at 15:59

## 2023-02-11 NOTE — ED TRIAGE NOTES
"Patient \"tweaked\" his lumbar back when grabbing a shovel to do his driveway around 0730. This has happened before. Tylenol on board  8/10 with movement and presents with girlfriend and a walker.      Triage Assessment     Row Name 02/11/23 1521       Triage Assessment (Adult)    Airway WDL WDL              "

## 2023-02-11 NOTE — DISCHARGE INSTRUCTIONS
You were seen in the Emergency Department today for evaluation of back pain after you injured it this morning.   You were prescribed a Medrol Dosepak, oxycodone, and Flexeril to help with your symptoms. You should take all medications as prescribed.  Follow up with your primary care physician to ensure resolution of symptoms. Return if you have new or worsening symptoms.   I placed a referral for you to follow-up in the spine clinic.

## 2023-02-11 NOTE — ED PROVIDER NOTES
EMERGENCY DEPARTMENT ENCOUNTER      NAME: Jeromy Valdivia  AGE: 37 year old male  YOB: 1985  MRN: [id number]  EVALUATION DATE & TIME: 2/11/2023  3:23 PM    PCP: Garrett Desouza    ED PROVIDER: Aliza Beasley M.D.      Chief Complaint   Patient presents with     Back Pain     FINAL IMPRESSION:  1. Back muscle spasm    2. Back strain, initial encounter      ED COURSE & MEDICAL DECISION MAKING:    Pertinent Labs & Imaging studies reviewed. (See chart for details)  ED Course as of 02/11/23 1651   Sat Feb 11, 2023   1540 Patient is a 37-year-old male who presents today for evaluation of some right-sided back pain after he was shoveling snow while ice fishing today.  He has a history of back issues in the past.  He has a reported herniated disc.  He is not having any numbness or tingling.  He is not having any weakness.  He does not have any bowel or bladder incontinence.  There is no red flag symptoms that make me think he needs imaging today.  All his tenderness is over the musculature.  He is 37 years old so I do not suspect that he has a spontaneous compression fracture and would need x-ray imaging at this time.  He says in the past when this is happened they have just managed his symptoms.  He is done well with oxycodone, Flexeril, and Medrol Dosepak's in the past.  He is using a walker right now to help him get around.  He was little tachycardic on arrival and I suspect that is related to his discomfort.  On my exam he has tenderness to the right side of his back musculature.  There is no significant midline tenderness.  He has no numbness when I touch his legs.  I did offer him a referral to spine clinic and he would like that.  He says in the past they talked about doing an epidural injection but is never actually had one.  I think he may benefit from that and they can help determine what to do when he follows up with them in the future.  I discussed the plan with him and he is in agreement.  We will  work on getting him discharged home.  I will give him a dose of Flexeril and oxycodone here before he leaves.   1550 Patient was seen on 11/2/2021 for his low back pain after he was referred by his primary care physician to the spine clinic.  They diagnosed him as pain secondary to lumbar radiculopathy.  They had gotten an MRI of his spine on 10/22/2021 and there was a right-sided disc protrusion at L5-S1 that was in contact with the descending right S1 nerve root.  At that time they had ordered physical therapy.      3:41 PM I met the patient and performed my initial interview and exam.   3:48 PM We discussed the plan for discharge and the patient is agreeable. Reviewed supportive cares, symptomatic treatment, outpatient follow up, and reasons to return to the Emergency Department. All questions and concerns were addressed. Patient to be discharged by ED RN.      Medical Decision Making    History:    Supplemental history from: Documented in chart, if applicable    External Record(s) reviewed: Documented in chart, if applicable. Spine clinic note    Work Up:    Chart documentation includes differential considered and any EKGs or imaging independently interpreted by provider, where specified.    In additional to work up documented, I considered the following work up: Documented in chart, if applicable.  Considered x-ray imaging but patient had no midline tenderness and no acute blunt trauma.  I considered MRI imaging but he had no red flag symptoms today.    External consultation:    Discussion of management with another provider: Documented in chart, if applicable    Complicating factors:    Care impacted by chronic illness: Other: Herniated disc in back    Care affected by social determinants of health: Access to Medical Care    Disposition considerations: Discharge. I prescribed additional prescription strength medication(s) as charted. N/A.    At the conclusion of the encounter I discussed  the results of all of  "the tests and the disposition with patient.   All questions were answered.  The patient acknowledged understanding and was involved in the decision making regarding the overall care plan.      I discussed with patient the utility, limitations and findings of the exam/interventions/studies done during this visit as well as the list of differential diagnosis and symptoms to monitor/return to ER for.  Additional verbal discharge instructions were provided.     MEDICATIONS GIVEN IN THE EMERGENCY:  Medications   oxyCODONE (ROXICODONE) tablet 5 mg (5 mg Oral Given 2/11/23 1559)   cyclobenzaprine (FLEXERIL) tablet 10 mg (10 mg Oral Given 2/11/23 1559)       NEW PRESCRIPTIONS STARTED AT TODAY'S ER VISIT  Discharge Medication List as of 2/11/2023  4:03 PM      START taking these medications    Details   cyclobenzaprine (FLEXERIL) 10 MG tablet Take 1 tablet (10 mg) by mouth 3 times daily as needed for muscle spasms, Disp-18 tablet, R-0, E-Prescribe      methylPREDNISolone (MEDROL DOSEPAK) 4 MG tablet therapy pack Follow Package Directions, Disp-21 tablet, R-0, E-Prescribe      oxyCODONE (ROXICODONE) 5 MG tablet Take 1 tablet (5 mg) by mouth every 6 hours as needed for pain, Disp-12 tablet, R-0, E-Prescribe                =================================================================    HPI    Triage Note:   Patient \"tweaked\" his lumbar back when grabbing a shovel to do his driveway around 0730. This has happened before. Tylenol on board  8/10 with movement and presents with girlfriend and a walker.      Triage Assessment     Row Name 02/11/23 1521       Triage Assessment (Adult)    Airway WDL WDL              Patient information was obtained from: patient     Use of : N/A        Jeromy Valdivia is a 37 year old male who presents to the ED for evaluation of back pain.    Patient reports he was ice fishing this morning and shoveling snow off the ice when his back gave out. The patient took tylenol with morning with mild " "relief of pain. The patient reports his pain is mostly on the right, lower side and feels like muscle spasms. The patient has a history of back issues and notes they feel similar. The patient denies any incontinence, numbness, weakness, or any other complaints at this time.     PAST MEDICAL HISTORY:  No past medical history on file.    PAST SURGICAL HISTORY:  No past surgical history on file.    CURRENT MEDICATIONS:    No current facility-administered medications for this encounter.    Current Outpatient Medications:      cyclobenzaprine (FLEXERIL) 10 MG tablet, Take 1 tablet (10 mg) by mouth 3 times daily as needed for muscle spasms, Disp: 18 tablet, Rfl: 0     methylPREDNISolone (MEDROL DOSEPAK) 4 MG tablet therapy pack, Follow Package Directions, Disp: 21 tablet, Rfl: 0     oxyCODONE (ROXICODONE) 5 MG tablet, Take 1 tablet (5 mg) by mouth every 6 hours as needed for pain, Disp: 12 tablet, Rfl: 0     allopurinol (ZYLOPRIM) 100 MG tablet, Take 1 tablet by mouth daily, Disp: , Rfl:      allopurinol (ZYLOPRIM) 100 MG tablet, Take 1 tablet (100 mg) by mouth daily, Disp: 90 tablet, Rfl: 3    ALLERGIES:  Allergies   Allergen Reactions     Flagyl [Metronidazole] Hives       FAMILY HISTORY:  No family history on file.    SOCIAL HISTORY:   Social History     Socioeconomic History     Marital status: Single   Tobacco Use     Smoking status: Some Days     Smokeless tobacco: Current   Substance and Sexual Activity     Alcohol use: Yes     Drug use: Never       PHYSICAL EXAM    VITAL SIGNS: /85   Pulse 108   Temp 98  F (36.7  C) (Temporal)   Resp 18   Ht 1.575 m (5' 2\")   Wt 90.7 kg (200 lb)   SpO2 99%   BMI 36.58 kg/m     GENERAL: Awake, alert, answering questions appropriately, comfortable appearing, no distress  SPEECH:  Easy to understand speech, Normal volume and sussy  PULMONARY: No respiratory distress, Lungs clear to auscultation bilaterally  CARDIOVASCULAR: Regular rate and rhythm, Distal pulses present " and normal.  EXTREMITIES: No lower extremity edema. Tenderness to right para spinal and lower back muscles. Equal sensation to lower extremities.   PSYCH: Normal mood and affect      I, Sandra Millardmg, am serving as a scribe to document services personally performed by Dr. Beasley based on my observation and the provider's statements to me. I, Aliza Beasley MD attest that Sandra Larkin is acting in a scribe capacity, has observed my performance of the services and has documented them in accordance with my direction.    Aliza Beasley M.D.  Emergency Medicine  [facility] EMERGENCY DEPARTMENT  [facility] [facility] [address]  [phone]  Dept: [phone]     Aliza Beasley MD  02/11/23 0299

## 2023-02-11 NOTE — ED NOTES
Pt reports hurting his back while ice fishing this morning pta; pt reports bending over to shovel some snow/ice and his back immediately began to hurt; nad noted; [place]; ambulating with walker for support;

## 2023-02-14 ENCOUNTER — OFFICE VISIT (OUTPATIENT)
Dept: FAMILY MEDICINE | Facility: CLINIC | Age: 38
End: 2023-02-14
Payer: COMMERCIAL

## 2023-02-14 VITALS
HEIGHT: 62 IN | BODY MASS INDEX: 35.7 KG/M2 | RESPIRATION RATE: 20 BRPM | DIASTOLIC BLOOD PRESSURE: 85 MMHG | SYSTOLIC BLOOD PRESSURE: 127 MMHG | HEART RATE: 103 BPM | WEIGHT: 194 LBS | OXYGEN SATURATION: 96 %

## 2023-02-14 DIAGNOSIS — M54.50 ACUTE MIDLINE LOW BACK PAIN WITHOUT SCIATICA: Primary | ICD-10-CM

## 2023-02-14 DIAGNOSIS — M51.26 LUMBAR DISC HERNIATION: ICD-10-CM

## 2023-02-14 PROCEDURE — 99214 OFFICE O/P EST MOD 30 MIN: CPT | Performed by: FAMILY MEDICINE

## 2023-02-14 RX ORDER — CYCLOBENZAPRINE HCL 10 MG
10 TABLET ORAL 3 TIMES DAILY PRN
Qty: 18 TABLET | Refills: 0 | Status: SHIPPED | OUTPATIENT
Start: 2023-02-14 | End: 2023-03-19

## 2023-02-14 NOTE — LETTER
[facility]  [address] [place] [address]  Phone: [phone]  Fax: [phone]    February 14, 2023        Jeromy Valdivia  [address]  [address]  [place] [address]          To whom it may concern:    RE: Jeromy Valdivia    Patient was seen and treated today at our clinic and missed work.2/13 till 2/17.    Please contact me for questions or concerns.      Sincerely,        Garrett Desouza MD

## 2023-02-14 NOTE — PROGRESS NOTES
"  Assessment & Plan     Acute midline low back pain without sciatica    - cyclobenzaprine (FLEXERIL) 10 MG tablet; Take 1 tablet (10 mg) by mouth 3 times daily as needed for muscle spasms    Lumbar disc herniation    Recent ER visit for exacerbation of lumbar right radicular pain, was given oxycodone, steroid and muscle relaxant, slowly improving, was also referred to spine surgery from the ER.  Has done PT in the past with mild but non lasting improvement' continue current management, may need some work limitation.      Review of external notes as documented elsewhere in note  30 minutes spent on the date of the encounter doing chart review, review of outside records, review of test results, interpretation of tests, patient visit and documentation      MED REC REQUIRED  Post Medication Reconciliation Status:  Discharge medications reconciled, continue medications without change    BMI:   Estimated body mass index is 35.48 kg/m  as calculated from the following:    Height as of this encounter: 1.575 m (5' 2\").    Weight as of this encounter: 88 kg (194 lb).   Weight management plan: Discussed healthy diet and exercise guidelines    Regular exercise    No follow-ups on file.    Garrett Desouza MD  [facility]    Ursula Pinzon is a 37 year old, presenting for the following health issues:  Hospital F/U      HPI     Following up on recent ER visit for lower back pain exacerbation, the pain started after shoveling, mild to moderate initially, was seen in the ER, was given steroids,  muscle relaxant, slowly improving.  Has a well-known history of disc hernia with right occasional radicular symptoms.  Has done PT in the past with mild but not lasting improvement.  He works as a , always bending his lower spine.    Review of Systems   Constitutional, HEENT, cardiovascular, pulmonary, gi and gu systems are negative, except as otherwise noted.      Objective    /85 (BP Location: Right " "arm, Patient Position: Sitting, Cuff Size: Adult Regular)   Pulse 103   Resp 20   Ht 1.575 m (5' 2\")   Wt 88 kg (194 lb)   SpO2 96%   BMI 35.48 kg/m    Body mass index is 35.48 kg/m .  Physical Exam   GENERAL: healthy, alert and no distress  NECK: no adenopathy, no asymmetry, masses, or scars and thyroid normal to palpation  RESP: lungs clear to auscultation - no rales, rhonchi or wheezes  CV: regular rate and rhythm, normal S1 S2, no S3 or S4, no murmur, click or rub, no peripheral edema and peripheral pulses strong  ABDOMEN: soft, nontender, no hepatosplenomegaly, no masses and bowel sounds normal  MS: Mild para spinal lumbar muscle spasm        This note was dictated using a voice recognition software.  Any grammatical or context distortion are unintentional and inherent to the software.                 "

## 2023-03-07 NOTE — PROGRESS NOTES
Assessment:   Jeromy Valdivia is a 37 year old y.o. male who is otherwise healthy who presents today for follow-up regarding acute on chronic low back pain with radiation into the right greater than left lower extremity with associated numbness, tingling, weakness.  Flare of pain began 1 month ago after ice fishing.  My review of an MRI lumbar spine from October 2021 showed a right-sided disc protrusion at L5-S1 contacting the right S1 nerve root.  There is also mild right lateral recess stenosis at L3-4 related to a shallow disc protrusion.  Symptoms follow a right S1 pattern.  On exam he demonstrated some weakness in the right ankle plantar flexors and he had a sensory deficit right S1 pattern.  Patient may also have planter fasciitis.  He does report increased heel pain for step out of bed.    PSP: Dr. Robins  Plan:     A shared decision making plan was used.  The patient's values and choices were respected.  The following represents what was discussed and decided upon by the physician assistant and the patient.      1.  DIAGNOSTIC TESTS: I reviewed the MRI lumbar spine from October 2021.  No further diagnostic tests were ordered.  If pain fails to improve the right L5-S1, S1-S2 epidural steroid injections I recommend obtaining an updated MRI lumbar spine for further evaluation.    2.  PHYSICAL THERAPY: Patient completed 6 sessions of physical therapy for lumbar radicular pain ending December 22, 2021.  He still does his home exercises.  No further physical therapy was ordered.    3.  MEDICATIONS: No changes are made to the patient's medications.  Patient would prefer to avoid adding pain relieving medications at this time.  - Patient took 3 days of his Medrol Dosepak prescribed at the emergency department last month but stopped because it was not helpful.    -Patient takes Cyclobenzaprine 10 mg daily  -We could consider adding gabapentin if needed.    4.  INTERVENTIONS:  -I offered the patient right L5-S1, S1-S2  transforaminal epidural steroid injections.  Patient would really like to get some relief of his pain before his wedding in [place] later this month.  Patient has weakness and a sensory deficit on exam.  Symptoms are more severe on the right than the left.  Patient indicated he would like to proceed and an order was placed.  - Of right side improves but he continues to have left-sided symptoms we could consider repeating the injections on the left side.    5.  PATIENT EDUCATION: Patient is in agreement the above plan.  All questions were answered.    6.  FOLLOW-UP: Patient will follow-up with me 2 weeks after his right L5-S1, S1-S2 transforaminal epidural steroid injections.  If he has questions or concerns in the meantime, he should not hesitate to call.    Subjective:     Jeromy Valdivia is a 37 year old male who presents today for follow-up regarding acute on chronic low back pain with radiation into the right greater than left lower extremity with associated numbness, tingling, weakness.  Patient had similar pain in fall 2021.  He saw Dr. Robins.  Dr. Robins referred him to physical therapy.  Physical therapy was somewhat helpful although his pain did not resolve completely.  Pain flared up February 11, 2023 after shoveling while ice fishing.  He had abrupt onset of severe pain causing him to fall to his knees.  He had a significant lumbar shift.  He was able to make his way to the car and apply a TENS unit which did help somewhat.  He then presented to the emergency department.   He was evaluated and discharged home with prescriptions for Medrol Dosepak, Flexeril, oxycodone.  He took the Medrol Dosepak for 3 days but stopped taking it because he did not feel like it was helping.  He continues to have significant pain.    Patient complains of midline low back pain at L5-S1.  Pain shoots across both sides of the lower back, worse on the right.  He then has pain in the right greater than left posterior calf  that extends into the heels and he has tingling in the right heel and lateral foot.  He rates his pain today as a 3 out of 10.  At its worst it is a 10 out of 10.  At its best it is a 3 out of 10.  Pain is aggravated with standing too long, sitting too long, lying down too long, twisting.  He notices much more pain if he is working a greater than 8-hour shift as a  which requires him to stand all day.  Pain is alleviated with stretching.  He denies loss of bowel or bladder control.  Denies recent fevers..      Treatment to date:  - 6 sessions physical therapy for lumbar radicular pain ending December 22, 2021.  He still does home exercises.  - Cyclobenzaprine 10 mg daily which is somewhat helpful  - Oxycodone 5 mg as needed which is helpful  - Stop Medrol Dosepak because it was not helping.    Review of Systems:  Positive for numbness/tingling, headache, pain much worse at night.  Negative for weakness, loss of bowel/bladder control, inability to urinate, trip/dental/falls, difficulty swallowing, difficulty with hand skills, fevers, unintentional weight loss.     Objective:   CONSTITUTIONAL:  Vital signs as above.  No acute distress.  The patient is well nourished and well groomed.    PSYCHIATRIC:  The patient is awake, alert, oriented to person, place and time.  The patient is answering questions appropriately with clear speech.  Normal affect.  HEENT: Normocephalic, atraumatic.  Sclera clear.    SKIN:  Skin over the face, posterior torso, bilateral upper and lower extremities is clean, dry, intact without rashes.  VASCULAR: No significant lower extremity edema.  MUSCULOSKELETAL:  Gait is non-antalgic.  The patient is able to heel and toe walk without any difficulty.  He is able to single-leg toe raise x5 on the left without difficulty.  Significant fatigability single-leg toe raising x5 on the right.  No significant tenderness palpation lumbar paraspinous muscles.   The patient has 5/5 strength for the  bilateral hip flexors, knee flexors/extensors, ankle dorsiflexors/plantar flexors, ankle evertors/invertors.    NEUROLOGICAL: Absent patellar, achilles reflexes which are symmetric bilaterally.  No ankle clonus bilaterally.  Diminished sensation right S1 dermatome.     RESULTS: I reviewed the MRI lumbar spine from [facility] dated October 22, 2021.  This shows a mild dextro convex curvature.  At L3-4 there is a disc bulge and right paracentral disc protrusion and mild facet arthropathy  with mild right lateral recess stenosis.  At L4-5 there is a shallow disc bulge and left central disc extrusion and mild facet arthropathy but no neural compromise.  At L5-S1 there is a broad-based disc bulge and right-sided disc protrusion abutting the right S1 nerve root.

## 2023-03-10 ENCOUNTER — OFFICE VISIT (OUTPATIENT)
Dept: PHYSICAL MEDICINE AND REHAB | Facility: CLINIC | Age: 38
End: 2023-03-10
Attending: EMERGENCY MEDICINE
Payer: COMMERCIAL

## 2023-03-10 VITALS — OXYGEN SATURATION: 96 % | DIASTOLIC BLOOD PRESSURE: 90 MMHG | HEART RATE: 106 BPM | SYSTOLIC BLOOD PRESSURE: 131 MMHG

## 2023-03-10 DIAGNOSIS — M54.16 LUMBAR RADICULITIS: Primary | ICD-10-CM

## 2023-03-10 DIAGNOSIS — M51.26 LUMBAR DISC HERNIATION: ICD-10-CM

## 2023-03-10 PROCEDURE — 99214 OFFICE O/P EST MOD 30 MIN: CPT | Performed by: PHYSICIAN ASSISTANT

## 2023-03-10 ASSESSMENT — PAIN SCALES - GENERAL: PAINLEVEL: MILD PAIN (3)

## 2023-03-10 NOTE — LETTER
3/10/2023         RE: Jeromy Valdivia  [address]  [address]  [place] [address]        Dear Colleague,    Thank you for referring your patient, Jeromy Valdivia, to the [facility]. Please see a copy of my visit note below.    Assessment:   Jeromy Valdivia is a 37 year old y.o. male who is otherwise healthy who presents today for follow-up regarding acute on chronic low back pain with radiation into the right greater than left lower extremity with associated numbness, tingling, weakness.  Flare of pain began 1 month ago after ice fishing.  My review of an MRI lumbar spine from October 2021 showed a right-sided disc protrusion at L5-S1 contacting the right S1 nerve root.  There is also mild right lateral recess stenosis at L3-4 related to a shallow disc protrusion.  Symptoms follow a right S1 pattern.  On exam he demonstrated some weakness in the right ankle plantar flexors and he had a sensory deficit right S1 pattern.  Patient may also have planter fasciitis.  He does report increased heel pain for step out of bed.    PSP: Dr. Robins  Plan:     A shared decision making plan was used.  The patient's values and choices were respected.  The following represents what was discussed and decided upon by the physician assistant and the patient.      1.  DIAGNOSTIC TESTS: I reviewed the MRI lumbar spine from October 2021.  No further diagnostic tests were ordered.  If pain fails to improve the right L5-S1, S1-S2 epidural steroid injections I recommend obtaining an updated MRI lumbar spine for further evaluation.    2.  PHYSICAL THERAPY: Patient completed 6 sessions of physical therapy for lumbar radicular pain ending December 22, 2021.  He still does his home exercises.  No further physical therapy was ordered.    3.  MEDICATIONS: No changes are made to the patient's medications.  Patient would prefer to avoid adding pain relieving medications at this time.  - Patient took 3 days of his Medrol Dosepak  prescribed at the emergency department last month but stopped because it was not helpful.    -Patient takes Cyclobenzaprine 10 mg daily  -We could consider adding gabapentin if needed.    4.  INTERVENTIONS:  -I offered the patient right L5-S1, S1-S2 transforaminal epidural steroid injections.  Patient would really like to get some relief of his pain before his wedding in [place] later this month.  Patient has weakness and a sensory deficit on exam.  Symptoms are more severe on the right than the left.  Patient indicated he would like to proceed and an order was placed.  - Of right side improves but he continues to have left-sided symptoms we could consider repeating the injections on the left side.    5.  PATIENT EDUCATION: Patient is in agreement the above plan.  All questions were answered.    6.  FOLLOW-UP: Patient will follow-up with me 2 weeks after his right L5-S1, S1-S2 transforaminal epidural steroid injections.  If he has questions or concerns in the meantime, he should not hesitate to call.    Subjective:     Jeromy Valdivia is a 37 year old male who presents today for follow-up regarding acute on chronic low back pain with radiation into the right greater than left lower extremity with associated numbness, tingling, weakness.  Patient had similar pain in fall 2021.  He saw Dr. Robins.  Dr. Robins referred him to physical therapy.  Physical therapy was somewhat helpful although his pain did not resolve completely.  Pain flared up February 11, 2023 after shoveling while ice fishing.  He had abrupt onset of severe pain causing him to fall to his knees.  He had a significant lumbar shift.  He was able to make his way to the car and apply a TENS unit which did help somewhat.  He then presented to the emergency department.   He was evaluated and discharged home with prescriptions for Medrol Dosepak, Flexeril, oxycodone.  He took the Medrol Dosepak for 3 days but stopped taking it because he did not feel  like it was helping.  He continues to have significant pain.    Patient complains of midline low back pain at L5-S1.  Pain shoots across both sides of the lower back, worse on the right.  He then has pain in the right greater than left posterior calf that extends into the heels and he has tingling in the right heel and lateral foot.  He rates his pain today as a 3 out of 10.  At its worst it is a 10 out of 10.  At its best it is a 3 out of 10.  Pain is aggravated with standing too long, sitting too long, lying down too long, twisting.  He notices much more pain if he is working a greater than 8-hour shift as a  which requires him to stand all day.  Pain is alleviated with stretching.  He denies loss of bowel or bladder control.  Denies recent fevers..      Treatment to date:  - 6 sessions physical therapy for lumbar radicular pain ending December 22, 2021.  He still does home exercises.  - Cyclobenzaprine 10 mg daily which is somewhat helpful  - Oxycodone 5 mg as needed which is helpful  - Stop Medrol Dosepak because it was not helping.    Review of Systems:  Positive for numbness/tingling, headache, pain much worse at night.  Negative for weakness, loss of bowel/bladder control, inability to urinate, trip/dental/falls, difficulty swallowing, difficulty with hand skills, fevers, unintentional weight loss.     Objective:   CONSTITUTIONAL:  Vital signs as above.  No acute distress.  The patient is well nourished and well groomed.    PSYCHIATRIC:  The patient is awake, alert, oriented to person, place and time.  The patient is answering questions appropriately with clear speech.  Normal affect.  HEENT: Normocephalic, atraumatic.  Sclera clear.    SKIN:  Skin over the face, posterior torso, bilateral upper and lower extremities is clean, dry, intact without rashes.  VASCULAR: No significant lower extremity edema.  MUSCULOSKELETAL:  Gait is non-antalgic.  The patient is able to heel and toe walk without any  difficulty.  He is able to single-leg toe raise x5 on the left without difficulty.  Significant fatigability single-leg toe raising x5 on the right.  No significant tenderness palpation lumbar paraspinous muscles.   The patient has 5/5 strength for the bilateral hip flexors, knee flexors/extensors, ankle dorsiflexors/plantar flexors, ankle evertors/invertors.    NEUROLOGICAL: Absent patellar, achilles reflexes which are symmetric bilaterally.  No ankle clonus bilaterally.  Diminished sensation right S1 dermatome.     RESULTS: I reviewed the MRI lumbar spine from [facility] dated October 22, 2021.  This shows a mild dextro convex curvature.  At L3-4 there is a disc bulge and right paracentral disc protrusion and mild facet arthropathy  with mild right lateral recess stenosis.  At L4-5 there is a shallow disc bulge and left central disc extrusion and mild facet arthropathy but no neural compromise.  At L5-S1 there is a broad-based disc bulge and right-sided disc protrusion abutting the right S1 nerve root.          Again, thank you for allowing me to participate in the care of your patient.        Sincerely,        Isabel Archer PA-C

## 2023-03-10 NOTE — PATIENT INSTRUCTIONS
Right L5/S1, S1/S2 epidural steroid injections has been ordered today.      Please note that this injection uses cortisone.  The cortisone may somewhat weaken the immune system.  It is unknown how much the immune system is weakened.  It is unknown if it is weakened to the point that you may be more likely to get the COVID-19 virus, or if you do get the COVID-19 virus, if you would be sicker than you would have been if you had not had the cortisone injection.  If you do not wish to proceed with the injection, please let the nurse/physician know and do NOT schedule the injection.    Please note that since your immune system is weakened from the cortisone, having any vaccine/shot may be less effective if you have this vaccine within 2 weeks from your cortisone injection.  It is advised to wait 2 weeks after your cortisone injection to have any vaccine (or if you have a vaccine first, wait 2 weeks before you have the cortisone injection).    Please schedule this injection at least 1 week  from now to allow time for insurance prior authorization.  On the day of your injection, you cannot be sick or taking antibiotics.  If you become sick and are prescribed, please call the clinic so your injection can be rescheduled for once you have completed your antibiotics.  You will need to bring a  with you for your injection.   If you have any questions or concerns prior to your injection, please do not hesitate to call the nurse navigation line at [phone] or contact Isabel Archer through [email or web address].

## 2023-03-17 ENCOUNTER — RADIOLOGY INJECTION OFFICE VISIT (OUTPATIENT)
Dept: PHYSICAL MEDICINE AND REHAB | Facility: CLINIC | Age: 38
End: 2023-03-17
Attending: PHYSICIAN ASSISTANT
Payer: COMMERCIAL

## 2023-03-17 VITALS
SYSTOLIC BLOOD PRESSURE: 116 MMHG | TEMPERATURE: 98.1 F | HEART RATE: 96 BPM | RESPIRATION RATE: 18 BRPM | OXYGEN SATURATION: 97 % | DIASTOLIC BLOOD PRESSURE: 72 MMHG

## 2023-03-17 DIAGNOSIS — M54.16 LUMBAR RADICULITIS: ICD-10-CM

## 2023-03-17 PROCEDURE — 64484 NJX AA&/STRD TFRM EPI L/S EA: CPT | Mod: RT | Performed by: PAIN MEDICINE

## 2023-03-17 PROCEDURE — 64483 NJX AA&/STRD TFRM EPI L/S 1: CPT | Mod: RT | Performed by: PAIN MEDICINE

## 2023-03-17 RX ORDER — LIDOCAINE HYDROCHLORIDE 10 MG/ML
INJECTION, SOLUTION EPIDURAL; INFILTRATION; INTRACAUDAL; PERINEURAL
Status: COMPLETED | OUTPATIENT
Start: 2023-03-17 | End: 2023-03-17

## 2023-03-17 RX ORDER — DEXAMETHASONE SODIUM PHOSPHATE 10 MG/ML
INJECTION, SOLUTION INTRAMUSCULAR; INTRAVENOUS
Status: COMPLETED | OUTPATIENT
Start: 2023-03-17 | End: 2023-03-17

## 2023-03-17 RX ADMIN — DEXAMETHASONE SODIUM PHOSPHATE 20 MG: 10 INJECTION, SOLUTION INTRAMUSCULAR; INTRAVENOUS at 09:54

## 2023-03-17 RX ADMIN — LIDOCAINE HYDROCHLORIDE 4 ML: 10 INJECTION, SOLUTION EPIDURAL; INFILTRATION; INTRACAUDAL; PERINEURAL at 09:53

## 2023-03-17 ASSESSMENT — PAIN SCALES - GENERAL
PAINLEVEL: NO PAIN (0)
PAINLEVEL: MILD PAIN (3)

## 2023-03-17 NOTE — PATIENT INSTRUCTIONS
DISCHARGE INSTRUCTIONS    During office hours (8:00 a.m.- 4:00 p.m.) questions or concerns may be answered  by calling [facility] Center Navigation Nurses at  [phone].  Messages received after hours will be returned the following business day.      In the case of an emergency, please dial 911 or seek assistance at the nearest Emergency Room/Urgent Care facility.     All Patients:    You may experience an increase in your symptoms for the first 2 days (It may take anywhere between 2 days- 2 weeks for the steroid to have maximum effect).    You may use ice on the injection site, as frequently as 20 minutes each hour if needed.    You may take your pain medicine.    You may continue taking your regular medication after your injection. If you have had a Medial Branch Block you may resume pain medication once your pain diary is completed.    You may shower. No swimming, tub bath or hot tub for 48 hours.  You may remove your bandaid/bandage as soon as you are home.    You may resume light activities, as tolerated.    Resume your usual diet as tolerated.    It is strongly advised that you do not drive for 1-3 hours post injection.    If you have had oral sedation:  Do not drive for 8 hours post injection.      If you have had IV sedation:  Do not drive for 24 hours post injection.  Do not operate hazardous machinery or make important personal/business decisions for 24 hours.      POSSIBLE STEROID SIDE EFFECTS (If steroid/cortisone was used for your procedure)    -If you experience these symptoms, it should only last for a short period    Swelling of the legs              Skin redness (flushing)     Mouth (oral) irritation   Blood sugar (glucose) levels            Sweats                    Mood changes  Headache  Sleeplessness  Weakened immune system for up to 14 days, which could increase the risk of zackery the COVID-19 virus and/or experiencing more severe symptoms of the disease, if exposed.  Decreased  effectiveness of the flu vaccine if given within 2 weeks of the steroid.         POSSIBLE PROCEDURE SIDE EFFECTS  -Call the Spine Center if you are concerned  Increased Pain           Increased numbness/tingling      Nausea/Vomiting          Bruising/bleeding at site      Redness or swelling                                              Difficulty walking      Weakness           Fever greater than 100.5    *In the event of a severe headache after an epidural steroid injection that is relieved by lying down, please call the [facility] to speak with a clinical staff member*

## 2023-03-19 ENCOUNTER — MYC REFILL (OUTPATIENT)
Dept: FAMILY MEDICINE | Facility: CLINIC | Age: 38
End: 2023-03-19
Payer: COMMERCIAL

## 2023-03-19 DIAGNOSIS — M54.50 ACUTE MIDLINE LOW BACK PAIN WITHOUT SCIATICA: ICD-10-CM

## 2023-03-20 NOTE — TELEPHONE ENCOUNTER
Routing refill request to provider for review/approval because:  Drug not on the [facility] refill protocol     Last Written Prescription Date:  2/14/2023  Last Fill Quantity: 18,  # refills: 0   Last office visit provider:  2/14/2023     Requested Prescriptions   Pending Prescriptions Disp Refills     cyclobenzaprine (FLEXERIL) 10 MG tablet 18 tablet 0     Sig: Take 1 tablet (10 mg) by mouth 3 times daily as needed for muscle spasms       There is no refill protocol information for this order          Penny Lynch RN 03/20/23 7:07 AM

## 2023-03-21 RX ORDER — CYCLOBENZAPRINE HCL 10 MG
10 TABLET ORAL 3 TIMES DAILY PRN
Qty: 18 TABLET | Refills: 0 | Status: SHIPPED | OUTPATIENT
Start: 2023-03-21 | End: 2023-06-06

## 2023-06-05 ENCOUNTER — TELEPHONE (OUTPATIENT)
Dept: PHYSICAL MEDICINE AND REHAB | Facility: CLINIC | Age: 38
End: 2023-06-05
Payer: COMMERCIAL

## 2023-06-05 DIAGNOSIS — M54.16 LUMBAR RADICULITIS: Primary | ICD-10-CM

## 2023-06-05 NOTE — TELEPHONE ENCOUNTER
Order placed to Bilateral L5-S1 TFESIs. Injection requirements reviewed. Transferred to scheduling to make appt.

## 2023-06-05 NOTE — TELEPHONE ENCOUNTER
"PSP:  Dr. Robins  Last clinic visit:  3/10/23 OV with Isabel; 3/17/23 Inj  Reason for call: Request for injections  Clinical information:  Call received from pt. Pt last had Right L5-S1, S1-S2 TFESIs on 3/17 which patient reports gave him 80% relief of his symptoms for 2 months. \"It really helped my right side but I was still having left sided symptoms too that it didn't help much with\".   Pt reports within the last week his symptoms have returned but on both sides. Pt endorses lower back pain \"right in the middle of my back\". Pain radiates down his left greater than right leg. Pt endorses tingling in his left greater than right calf towards his heels.   Pt requesting injections to be done on both sides this time.   Advice given to patient: Informed pt that provider would be updated with his status and he will be called with a response. Detailed message ok upon call back.   Provider to address: Please advise on injections vs. Follow-up     "

## 2023-06-06 ENCOUNTER — OFFICE VISIT (OUTPATIENT)
Dept: FAMILY MEDICINE | Facility: CLINIC | Age: 38
End: 2023-06-06
Payer: COMMERCIAL

## 2023-06-06 VITALS
WEIGHT: 196.8 LBS | SYSTOLIC BLOOD PRESSURE: 110 MMHG | TEMPERATURE: 97.6 F | BODY MASS INDEX: 36.22 KG/M2 | HEART RATE: 100 BPM | RESPIRATION RATE: 16 BRPM | DIASTOLIC BLOOD PRESSURE: 86 MMHG | HEIGHT: 62 IN | OXYGEN SATURATION: 96 %

## 2023-06-06 DIAGNOSIS — M54.50 ACUTE MIDLINE LOW BACK PAIN WITHOUT SCIATICA: Primary | ICD-10-CM

## 2023-06-06 PROCEDURE — 99214 OFFICE O/P EST MOD 30 MIN: CPT | Performed by: NURSE PRACTITIONER

## 2023-06-06 RX ORDER — GABAPENTIN 300 MG/1
300 CAPSULE ORAL 3 TIMES DAILY PRN
Qty: 40 CAPSULE | Refills: 0 | Status: SHIPPED | OUTPATIENT
Start: 2023-06-06

## 2023-06-06 RX ORDER — OXYCODONE HYDROCHLORIDE 5 MG/1
5 TABLET ORAL EVERY 6 HOURS PRN
Qty: 20 TABLET | Refills: 0 | Status: SHIPPED | OUTPATIENT
Start: 2023-06-06 | End: 2024-05-16

## 2023-06-06 ASSESSMENT — PAIN SCALES - GENERAL: PAINLEVEL: EXTREME PAIN (8)

## 2023-06-06 NOTE — PROGRESS NOTES
"Assessment & Plan     ICD-10-CM    1. Acute midline low back pain without sciatica  M54.50 gabapentin (NEURONTIN) 300 MG capsule     oxyCODONE (ROXICODONE) 5 MG tablet          Patient with worsening low back pain.  Does have upcoming injection and consultation with neurosurgery.  Try gabapentin.  Reviewed side effect.  Did find benefit with oxycodone.  Reviewed potential risks of this medication.  Reviewed this is not a good long-term solution.  Will send message to neurosurgery prior to consultation to see if they would like for me to update his lumbar MRI.    Reviewed family medicine note x1, ED note x2, lumbar MRI x1    Subjective     HPI       Back Pain:  He presents for follow up of back pain. Patient's back pain is a recurring problem.  Location of back pain:  Right lower back, left lower back, right middle of back, left middle of back, right buttock and left buttock  Description of back pain: burning, cramping, dull ache, sharp and stabbing  Back pain spreads: right buttocks, left buttocks, right foot and left foot    Longstanding issue of low back pain.  Has had ED visits for this in the past.  Saw spine care earlier this year. Did have a back injection in March. Pain started coming back again. As the shot wore off, the left side started to hurt more. No sciatica. Buttocks tightens up. Does have upcoming injection scheduled for the end of the month and will be meeting with neurosurgery in mid July. No LOC of bowel or bladder.  Atraumatic. No fever or chills.  Tried medrol which doesn't help. Tried flexeril which only made him sleepy.  Oxycodone helps a little. Can't stay upright      Review of Systems - negative except for what's listed in the HPI      Objective    /86 (BP Location: Left arm, Patient Position: Sitting, Cuff Size: Adult Regular)   Pulse 100   Temp 97.6  F (36.4  C) (Oral)   Resp 16   Ht 1.575 m (5' 2\")   Wt 89.3 kg (196 lb 12.8 oz)   SpO2 96%   BMI 36.00 kg/m    Physical Exam "   General appearance - alert, well appearing, and in no distress  Mental status - alert, oriented to person, place, and time  Neurological -grossly intact but describes paresthesias along the feet with plantarflexion.  Musculoskeletal -slow sit to stand.  Antalgic gait.  Strength with plantarflexion/dorsiflexion 5/5 bilaterally.  Stooped posture  Extremities - peripheral pulses normal, no peripheral edema  Skin - normal coloration and turgor.    Juan José Roe, CNP    This note has been dictated using voice recognition software. Any grammatical or context distortions are unintentional and inherent to the software.

## 2023-06-06 NOTE — LETTER
To whom it may concern,          Please excuse Jeromy Valdivia for days missed from 6/5/23 through 6/20/23 due to illness. If recovered before this point, he is ok to return to work.  If you have any questions, I am typically in the office Tuesdays-Fridays 7AM-4PM.            Juan José Roe, CNP

## 2023-06-06 NOTE — PATIENT INSTRUCTIONS
I sent 2 prescriptions to your pharmacy.    The first is for gabapentin.  This one is specifically for nerve pain.  This can be taken 3 times a day as needed.  Watch out for lethargy/sedation side effects.    I also sent a refill of that oxycodone.  Try not to take at the same time as the gabapentin as this can increase the risk of sedation.    No alcohol with either of these medications.    I will send a message to the neurosurgery team who will be seeing you to see if they would like for me to order imaging before you connect with them.    Work note provided today

## 2023-06-08 ENCOUNTER — TELEPHONE (OUTPATIENT)
Dept: FAMILY MEDICINE | Facility: CLINIC | Age: 38
End: 2023-06-08
Payer: COMMERCIAL

## 2023-06-08 NOTE — TELEPHONE ENCOUNTER
Please check in with patient.    I did hear back from neurosurgery team.  They want to see how he responds to the injections before repeating imaging.    See how he is doing with the gabapentin in terms of pain relief and tolerance    Juan José Roe, CNP

## 2023-06-08 NOTE — TELEPHONE ENCOUNTER
Pt notified.    Gabapentin is working well so far, especially on days where he isn't moving around a ton.   It gives him relief for a good 4-5 hours.

## 2023-06-09 NOTE — TELEPHONE ENCOUNTER
Thanks for the update.  Please let him know that if tolerating well he can even take it 4 times a day as needed to get more relief.  We can always increase the strength of the gabapentin too.  Keep me updated if needing to make adjustments.    Juan José Roe, CNP

## 2023-06-12 ENCOUNTER — MYC MEDICAL ADVICE (OUTPATIENT)
Dept: FAMILY MEDICINE | Facility: CLINIC | Age: 38
End: 2023-06-12
Payer: COMMERCIAL

## 2023-06-16 ENCOUNTER — TELEPHONE (OUTPATIENT)
Dept: FAMILY MEDICINE | Facility: CLINIC | Age: 38
End: 2023-06-16
Payer: COMMERCIAL

## 2023-06-16 NOTE — TELEPHONE ENCOUNTER
Forms Request  Name of form/paperwork: [facility]  Have you been seen for this request: N/A  Do we have the form: yes, in [employer] inbox  When is form needed by: when done  How would you like the form returned: fax  Patient Notified form requests are processed in 3-5 business days: n/a    Okay to leave a detailed message? n/a    L

## 2023-06-16 NOTE — LETTER
To whom it may concern,              Please excuse Jeromy Valdivia for days missed from 6/5/23 through 6/25/23 due to illness. If you have any questions, I am typically in the office Tuesdays-Fridays 7AM-4PM.                 Juan José Roe, CNP

## 2023-06-18 ENCOUNTER — MYC REFILL (OUTPATIENT)
Dept: FAMILY MEDICINE | Facility: CLINIC | Age: 38
End: 2023-06-18
Payer: COMMERCIAL

## 2023-06-18 DIAGNOSIS — E79.0 HYPERURICEMIA: Primary | ICD-10-CM

## 2023-06-18 NOTE — TELEPHONE ENCOUNTER
"Routing refill request to provider for review/approval because:  Labs not current:  ALT, Uric Acid  Pt reported    Last Written Prescription Date:  ?  Last Fill Quantity: ?,  # refills: ?   Last office visit provider:  6/6/23     Requested Prescriptions   Pending Prescriptions Disp Refills     allopurinol (ZYLOPRIM) 100 MG tablet       Sig: Take 1 tablet (100 mg) by mouth daily       Gout Agents Protocol Failed - 6/18/2023 10:42 AM        Failed - ALT on file in past 12 months     Recent Labs   Lab Test 08/11/21  1549   ALT 36             Failed - Has Uric Acid on file in past 12 months and value is less than 6     No lab results found.  If level is 6mg/dL or greater, ok to refill one time and refer to provider.           Passed - CBC on file in past 12 months     Recent Labs   Lab Test 08/20/22  2149   WBC 8.3   RBC 5.16   HGB 15.6   HCT 46.9                    Passed - Recent (12 mo) or future (30 days) visit within the authorizing provider's specialty     Patient has had an office visit with the authorizing provider or a provider within the authorizing providers department within the previous 12 mos or has a future within next 30 days. See \"Patient Info\" tab in inbasket, or \"Choose Columns\" in Meds & Orders section of the refill encounter.              Passed - Medication is active on med list        Passed - Patient is age 18 or older        Passed - Normal serum creatinine on file in the past 12 months     Recent Labs   Lab Test 08/20/22  2149   CR 1.02       Ok to refill medication if creatinine is low               Yandy Haddad 06/18/23 10:43 AM  "

## 2023-06-19 NOTE — TELEPHONE ENCOUNTER
Please reach out to patient.    I'm just about done with his paperwork but need a couple clarifications.    As far as restrictions imposed, I am guessing that he has not been going to work at all.  Is that correct?  If he has been going to work, get specifics about what his typical daily job activities include and can he or can he not perform them?    If he has been off work or on restrictions, what date did this start?    I was going to put the restrictions a few days after his upcoming injection which would be a return to work on June 25.  This would only be temporary and we could extended out longer if the injection doesn't work.  How does that sound to him?

## 2023-06-20 RX ORDER — ALLOPURINOL 100 MG/1
100 TABLET ORAL DAILY
Qty: 90 TABLET | Refills: 3 | Status: SHIPPED | OUTPATIENT
Start: 2023-06-20 | End: 2023-12-18

## 2023-06-21 ENCOUNTER — RADIOLOGY INJECTION OFFICE VISIT (OUTPATIENT)
Dept: PHYSICAL MEDICINE AND REHAB | Facility: CLINIC | Age: 38
End: 2023-06-21
Attending: PAIN MEDICINE
Payer: COMMERCIAL

## 2023-06-21 VITALS
BODY MASS INDEX: 36.07 KG/M2 | HEIGHT: 62 IN | WEIGHT: 196 LBS | TEMPERATURE: 98 F | RESPIRATION RATE: 16 BRPM | SYSTOLIC BLOOD PRESSURE: 112 MMHG | OXYGEN SATURATION: 97 % | HEART RATE: 88 BPM | DIASTOLIC BLOOD PRESSURE: 78 MMHG

## 2023-06-21 DIAGNOSIS — M54.16 LUMBAR RADICULITIS: ICD-10-CM

## 2023-06-21 PROCEDURE — 64483 NJX AA&/STRD TFRM EPI L/S 1: CPT | Mod: 50 | Performed by: PAIN MEDICINE

## 2023-06-21 RX ORDER — DEXAMETHASONE SODIUM PHOSPHATE 10 MG/ML
INJECTION, SOLUTION INTRAMUSCULAR; INTRAVENOUS
Status: COMPLETED | OUTPATIENT
Start: 2023-06-21 | End: 2023-06-21

## 2023-06-21 RX ORDER — LIDOCAINE HYDROCHLORIDE 10 MG/ML
INJECTION, SOLUTION EPIDURAL; INFILTRATION; INTRACAUDAL; PERINEURAL
Status: COMPLETED | OUTPATIENT
Start: 2023-06-21 | End: 2023-06-21

## 2023-06-21 RX ADMIN — DEXAMETHASONE SODIUM PHOSPHATE 10 MG: 10 INJECTION, SOLUTION INTRAMUSCULAR; INTRAVENOUS at 11:02

## 2023-06-21 RX ADMIN — LIDOCAINE HYDROCHLORIDE 4 ML: 10 INJECTION, SOLUTION EPIDURAL; INFILTRATION; INTRACAUDAL; PERINEURAL at 11:00

## 2023-06-21 RX ADMIN — DEXAMETHASONE SODIUM PHOSPHATE 10 MG: 10 INJECTION, SOLUTION INTRAMUSCULAR; INTRAVENOUS at 11:00

## 2023-06-21 ASSESSMENT — PAIN SCALES - GENERAL
PAINLEVEL: MILD PAIN (3)
PAINLEVEL: NO PAIN (0)

## 2023-06-21 NOTE — TELEPHONE ENCOUNTER
Pt called back, he has been off work from 6/5/23 through today.    He was going to go back today but injection got rescheduled to today.    He is fine with going back on 25th with some restrictions.     At work he is not sure what they will ask him to do at work at this point, just knows he cannot lift heavy stuff.     He will need additional note or for us to call for him to be off until 25th since today was the original return to work date.

## 2023-06-21 NOTE — PATIENT INSTRUCTIONS
Follow-up visit with SUSHIL Denson in 2-4 weeks to discuss injection outcome and determine care plan going forward.       DISCHARGE INSTRUCTIONS    During office hours (8:00 a.m.- 4:00 p.m.) questions or concerns may be answered  by calling [facility] at  [phone].  Messages received after hours will be returned the following business day.      In the case of an emergency, please dial 911 or seek assistance at the nearest Emergency Room/Urgent Care facility.     All Patients:    You may experience an increase in your symptoms for the first 2 days (It may take anywhere between 2 days- 2 weeks for the steroid to have maximum effect).    You may use ice on the injection site, as frequently as 20 minutes each hour if needed.    You may take your pain medicine.    You may continue taking your regular medication after your injection. If you have had a Medial Branch Block you may resume pain medication once your pain diary is completed.    You may shower. No swimming, tub bath or hot tub for 48 hours.  You may remove your bandaid/bandage as soon as you are home.    You may resume light activities, as tolerated.    Resume your usual diet as tolerated.    It is strongly advised that you do not drive for 1-3 hours post injection.    If you have had oral sedation:  Do not drive for 8 hours post injection.      If you have had IV sedation:  Do not drive for 24 hours post injection.  Do not operate hazardous machinery or make important personal/business decisions for 24 hours.      POSSIBLE STEROID SIDE EFFECTS (If steroid/cortisone was used for your procedure)    -If you experience these symptoms, it should only last for a short period    Swelling of the legs              Skin redness (flushing)     Mouth (oral) irritation   Blood sugar (glucose) levels            Sweats                    Mood changes  Headache  Sleeplessness  Weakened immune system for up to 14 days, which could increase the risk of  zackery the COVID-19 virus and/or experiencing more severe symptoms of the disease, if exposed.  Decreased effectiveness of the flu vaccine if given within 2 weeks of the steroid.         POSSIBLE PROCEDURE SIDE EFFECTS  -Call the [facility] if you are concerned  Increased Pain           Increased numbness/tingling      Nausea/Vomiting          Bruising/bleeding at site      Redness or swelling                                              Difficulty walking      Weakness           Fever greater than 100.5    *In the event of a severe headache after an epidural steroid injection that is relieved by lying down, please call the [facility] to speak with a clinical staff member*

## 2023-06-21 NOTE — TELEPHONE ENCOUNTER
Left message to call back for: Jeromy  Information to relay to patient: Please relay message below and route response directly to provider

## 2023-06-22 NOTE — TELEPHONE ENCOUNTER
Thank you.  I filled out his form for only light duty up until the point he is able to meet with neurosurgery.  Hopefully the injection is successful and we can get him back on regular duty before that, but this buys us a little time.    I also wrote a another letter excusing him through work through 6/25/2023 as requested.    This was given to Jazmin Roe, CNP

## 2023-06-23 NOTE — TELEPHONE ENCOUNTER
Paperwork faxed and copy sent to be scanned.    Copies made for onsite clinic records? YES  (If Yes, place in provider gray hanging bin)    Copy needed for patient? NO  If Yes,     Called pt to let him know I would put the wet ink signature letter at the desk. Pt states he does not need it, he will use the one on his Mychart. Form shredded.

## 2023-07-17 ENCOUNTER — OFFICE VISIT (OUTPATIENT)
Dept: PHYSICAL MEDICINE AND REHAB | Facility: CLINIC | Age: 38
End: 2023-07-17
Payer: COMMERCIAL

## 2023-07-17 VITALS
SYSTOLIC BLOOD PRESSURE: 119 MMHG | WEIGHT: 196 LBS | BODY MASS INDEX: 36.07 KG/M2 | HEIGHT: 62 IN | DIASTOLIC BLOOD PRESSURE: 75 MMHG | HEART RATE: 95 BPM

## 2023-07-17 DIAGNOSIS — M79.671 HEEL PAIN, BILATERAL: Primary | ICD-10-CM

## 2023-07-17 DIAGNOSIS — M47.816 LUMBAR FACET ARTHROPATHY: ICD-10-CM

## 2023-07-17 DIAGNOSIS — M51.26 LUMBAR DISC HERNIATION: ICD-10-CM

## 2023-07-17 DIAGNOSIS — M54.16 LUMBAR RADICULITIS: ICD-10-CM

## 2023-07-17 DIAGNOSIS — M79.672 HEEL PAIN, BILATERAL: Primary | ICD-10-CM

## 2023-07-17 PROCEDURE — 99213 OFFICE O/P EST LOW 20 MIN: CPT | Performed by: PHYSICIAN ASSISTANT

## 2023-07-17 ASSESSMENT — PAIN SCALES - GENERAL: PAINLEVEL: MILD PAIN (3)

## 2023-07-17 NOTE — PROGRESS NOTES
Assessment:   Jeromy Valdivia is a 37 year old y.o. male who is otherwise healthy who presents today for follow-up regarding chronic low back pain with bilateral lower extremity pain.  Lower extremity pain involves the heels and posterior calves.   My review of an MRI lumbar spine from October 2021 showed a right-sided disc protrusion at L5-S1 contacting the right S1 nerve root.  Patient is following up after bilateral L5-S1 transforaminal epidural steroid injections June 21, 2023 which provided 50% relief of pain.  He has had more improvement in his back pain and his leg pain.  I am concerned he may have planter fasciitis.  His leg pain is atypical.  He does not feel like the pain radiates from the back down the legs.  Rather, it starts at the heels and radiates up the calves.    PSP: Dr. Robins  Plan:     A shared decision making plan was used.  The patient's values and choices were respected.  The following represents what was discussed and decided upon by the physician assistant and the patient.      1.  DIAGNOSTIC TESTS: I reviewed the MRI lumbar spine from October 2021.  No further diagnostic tests were ordered.  If patient has only short-term relief after the epidural steroid injections I would recommend getting an updated MRI lumbar spine before pursuing additional interventional pain management.    2.  PHYSICAL THERAPY: Patient completed 6 sessions of physical therapy for lumbar radicular pain ending December 22, 2021.  He still does his home exercises.  No further physical therapy was ordered.    3.  MEDICATIONS: No changes are made to the patient's medications.  - Patient has been taking gabapentin before his injections but stopped the medication because he wanted to see if the injections helped.  I told the patient he could resume using the medication for additional pain management.  Patient would prefer to avoid pain relieving medications if possible.    4.  INTERVENTIONS: No additional interventions were  ordered.  We could repeat the bilateral L5-S1 transforaminal epidural steroid injections in the future if needed if they provide meaningful relief.  - For the axial low back pain we could also consider medial branch blocks as a work-up toward radiofrequency ablation.    5.  REFERRALS: Entered a referral to podiatry to evaluate for possible plantar fasciitis.    6.  WORK: Patient reports that his primary care provider wrote light duty work restrictions and submitted them for short-term disability.  He needs a note for his employer outlining those restrictions because he wants to avoid heavy lifting.  I cannot find the short-term disability paperwork filled out by the patient's primary care provider.  I recommended that the patient request this letter from his primary care provider.    6.  FOLLOW-UP: Patient will follow-up with me as needed.  If he has questions or concerns, he should not hesitate to call.    Subjective:     Jeromy Valdivia is a 37 year old male who presents today for follow-up regarding acute on chronic low back pain with bilateral lower extremity pain.  Patient is following up after bilateral L5-S1 transforaminal epidural steroid injections June 21, 2023 which provided 50% relief of pain.  He notes more improvement in back pain than leg pain.    Patient complains of bilateral low back pain.  Pain is located lumbosacral junction.  He denies any pain radiating from the back down the legs.  However, he continues to have bilateral heel pain which radiates up into the calves.  Both sides are equally affected.  He has heel pain when he first episode of bed in the morning and after being on his feet all day.  He rates his pain today as a 3 out of 10.  At its worst it is a 7 out of 10.  At its best it is a 3 out of 10.  Pain is aggravated with standing for long, bending, sitting.  Pain is alleviated with lying down, stretching.  He has some numbness and tingling in the same distribution as his pain.  Denies  weakness.  Denies loss of bowel or bladder control.  Denies fevers.    Treatment to date:  - 6 sessions physical therapy for lumbar radicular pain ending December 22, 2021.  He still does home exercises.  - Right L5-S1 and S1-S2 transforaminal epidural steroid injections March 17, 2023 provided 80% relief of pain for 2 months  - Bilateral L5-S1 transforaminal epidural steroid injections June 21, 2023 with 50% relief  - Gabapentin was helpful   - Cyclobenzaprine 10 mg   - Oxycodone   - Medrol Dosepak    Review of Systems:  Positive for numbness/tingling, headache, pain much worse at night.  Negative for weakness, loss of bowel/bladder control, inability to urinate, trip/dental/falls, difficulty swallowing, difficulty with hand skills, fevers, unintentional weight loss.     Objective:   CONSTITUTIONAL:  Vital signs as above.  No acute distress.  The patient is well nourished and well groomed.    PSYCHIATRIC:  The patient is awake, alert, oriented to person, place and time.  The patient is answering questions appropriately with clear speech.  Normal affect.  HEENT: Normocephalic, atraumatic.  Sclera clear.    SKIN:  Skin over the face, posterior torso, bilateral upper and lower extremities is clean, dry, intact without rashes.  VASCULAR: No significant lower extremity edema.  MUSCULOSKELETAL:  Gait is non-antalgic.  The patient is able to heel and toe walk without any difficulty.  No significant tenderness palpation lumbar paraspinous muscles.   The patient has 5/5 strength for the bilateral hip flexors, knee flexors/extensors, ankle dorsiflexors/plantar flexors, ankle evertors/invertors.    NEUROLOGICAL:   No ankle clonus bilaterally.  Diminished sensation right S1 dermatome.     RESULTS: I reviewed the MRI lumbar spine from [facility] dated October 22, 2021.  This shows a mild dextro convex curvature.  At L3-4 there is a disc bulge and right paracentral disc protrusion and mild facet arthropathy  with mild right  lateral recess stenosis.  At L4-5 there is a shallow disc bulge and left central disc extrusion and mild facet arthropathy but no neural compromise.  At L5-S1 there is a broad-based disc bulge and right-sided disc protrusion abutting the right S1 nerve root.

## 2023-07-17 NOTE — PATIENT INSTRUCTIONS
I am so happy that you are feeling better!  If your pain returns we can repeat the injection(s).  You can have injections up to 4 times per year.    If your pain returns or if you have any other questions or concerns please send me a [employer] message or call our nurse line at [phone]. '    We also talked about radiofrequency ablation as an option for your back pain, but this would not help leg pain.

## 2023-07-17 NOTE — LETTER
7/17/2023         RE: Jeromy Valdivia  [address]  [address]  [place] [address]        Dear Colleague,    Thank you for referring your patient, Jeromy Valdivia, to the [facility]. Please see a copy of my visit note below.    Assessment:   Jeromy Valdivia is a 37 year old y.o. male who is otherwise healthy who presents today for follow-up regarding chronic low back pain with bilateral lower extremity pain.  Lower extremity pain involves the heels and posterior calves.   My review of an MRI lumbar spine from October 2021 showed a right-sided disc protrusion at L5-S1 contacting the right S1 nerve root.  Patient is following up after bilateral L5-S1 transforaminal epidural steroid injections June 21, 2023 which provided 50% relief of pain.  He has had more improvement in his back pain and his leg pain.  I am concerned he may have planter fasciitis.  His leg pain is atypical.  He does not feel like the pain radiates from the back down the legs.  Rather, it starts at the heels and radiates up the calves.    PSP: Dr. Robins  Plan:     A shared decision making plan was used.  The patient's values and choices were respected.  The following represents what was discussed and decided upon by the physician assistant and the patient.      1.  DIAGNOSTIC TESTS: I reviewed the MRI lumbar spine from October 2021.  No further diagnostic tests were ordered.  If patient has only short-term relief after the epidural steroid injections I would recommend getting an updated MRI lumbar spine before pursuing additional interventional pain management.    2.  PHYSICAL THERAPY: Patient completed 6 sessions of physical therapy for lumbar radicular pain ending December 22, 2021.  He still does his home exercises.  No further physical therapy was ordered.    3.  MEDICATIONS: No changes are made to the patient's medications.  - Patient has been taking gabapentin before his injections but stopped the medication because he wanted  to see if the injections helped.  I told the patient he could resume using the medication for additional pain management.  Patient would prefer to avoid pain relieving medications if possible.    4.  INTERVENTIONS: No additional interventions were ordered.  We could repeat the bilateral L5-S1 transforaminal epidural steroid injections in the future if needed if they provide meaningful relief.  - For the axial low back pain we could also consider medial branch blocks as a work-up toward radiofrequency ablation.    5.  REFERRALS: Entered a referral to podiatry to evaluate for possible plantar fasciitis.    6.  WORK: Patient reports that his primary care provider wrote light duty work restrictions and submitted them for short-term disability.  He needs a note for his employer outlining those restrictions because he wants to avoid heavy lifting.  I cannot find the short-term disability paperwork filled out by the patient's primary care provider.  I recommended that the patient request this letter from his primary care provider.    6.  FOLLOW-UP: Patient will follow-up with me as needed.  If he has questions or concerns, he should not hesitate to call.    Subjective:     Jeromy Valdivia is a 37 year old male who presents today for follow-up regarding acute on chronic low back pain with bilateral lower extremity pain.  Patient is following up after bilateral L5-S1 transforaminal epidural steroid injections June 21, 2023 which provided 50% relief of pain.  He notes more improvement in back pain than leg pain.    Patient complains of bilateral low back pain.  Pain is located lumbosacral junction.  He denies any pain radiating from the back down the legs.  However, he continues to have bilateral heel pain which radiates up into the calves.  Both sides are equally affected.  He has heel pain when he first episode of bed in the morning and after being on his feet all day.  He rates his pain today as a 3 out of 10.  At its worst it is  a 7 out of 10.  At its best it is a 3 out of 10.  Pain is aggravated with standing for long, bending, sitting.  Pain is alleviated with lying down, stretching.  He has some numbness and tingling in the same distribution as his pain.  Denies weakness.  Denies loss of bowel or bladder control.  Denies fevers.    Treatment to date:  - 6 sessions physical therapy for lumbar radicular pain ending December 22, 2021.  He still does home exercises.  - Right L5-S1 and S1-S2 transforaminal epidural steroid injections March 17, 2023 provided 80% relief of pain for 2 months  - Bilateral L5-S1 transforaminal epidural steroid injections June 21, 2023 with 50% relief  - Gabapentin was helpful   - Cyclobenzaprine 10 mg   - Oxycodone   - Medrol Dosepak    Review of Systems:  Positive for numbness/tingling, headache, pain much worse at night.  Negative for weakness, loss of bowel/bladder control, inability to urinate, trip/dental/falls, difficulty swallowing, difficulty with hand skills, fevers, unintentional weight loss.     Objective:   CONSTITUTIONAL:  Vital signs as above.  No acute distress.  The patient is well nourished and well groomed.    PSYCHIATRIC:  The patient is awake, alert, oriented to person, place and time.  The patient is answering questions appropriately with clear speech.  Normal affect.  HEENT: Normocephalic, atraumatic.  Sclera clear.    SKIN:  Skin over the face, posterior torso, bilateral upper and lower extremities is clean, dry, intact without rashes.  VASCULAR: No significant lower extremity edema.  MUSCULOSKELETAL:  Gait is non-antalgic.  The patient is able to heel and toe walk without any difficulty.  No significant tenderness palpation lumbar paraspinous muscles.   The patient has 5/5 strength for the bilateral hip flexors, knee flexors/extensors, ankle dorsiflexors/plantar flexors, ankle evertors/invertors.    NEUROLOGICAL:   No ankle clonus bilaterally.  Diminished sensation right S1 dermatome.      RESULTS: I reviewed the MRI lumbar spine from [facility] dated October 22, 2021.  This shows a mild dextro convex curvature.  At L3-4 there is a disc bulge and right paracentral disc protrusion and mild facet arthropathy  with mild right lateral recess stenosis.  At L4-5 there is a shallow disc bulge and left central disc extrusion and mild facet arthropathy but no neural compromise.  At L5-S1 there is a broad-based disc bulge and right-sided disc protrusion abutting the right S1 nerve root.          Again, thank you for allowing me to participate in the care of your patient.        Sincerely,        Isabel Archer PA-C

## 2023-08-03 ENCOUNTER — OFFICE VISIT (OUTPATIENT)
Dept: PODIATRY | Facility: CLINIC | Age: 38
End: 2023-08-03
Attending: PHYSICIAN ASSISTANT
Payer: COMMERCIAL

## 2023-08-03 VITALS
HEART RATE: 103 BPM | BODY MASS INDEX: 36.07 KG/M2 | HEIGHT: 62 IN | RESPIRATION RATE: 16 BRPM | SYSTOLIC BLOOD PRESSURE: 122 MMHG | OXYGEN SATURATION: 97 % | DIASTOLIC BLOOD PRESSURE: 82 MMHG | WEIGHT: 196 LBS

## 2023-08-03 DIAGNOSIS — M72.2 PLANTAR FASCIITIS: Primary | ICD-10-CM

## 2023-08-03 PROCEDURE — 20550 NJX 1 TENDON SHEATH/LIGAMENT: CPT | Mod: 50 | Performed by: PODIATRIST

## 2023-08-03 PROCEDURE — 99243 OFF/OP CNSLTJ NEW/EST LOW 30: CPT | Mod: 25 | Performed by: PODIATRIST

## 2023-08-03 RX ORDER — DEXAMETHASONE SODIUM PHOSPHATE 4 MG/ML
4 INJECTION, SOLUTION INTRA-ARTICULAR; INTRALESIONAL; INTRAMUSCULAR; INTRAVENOUS; SOFT TISSUE ONCE
Status: COMPLETED | OUTPATIENT
Start: 2023-08-03 | End: 2023-08-03

## 2023-08-03 RX ORDER — LIDOCAINE HYDROCHLORIDE 20 MG/ML
1 INJECTION, SOLUTION INFILTRATION; PERINEURAL ONCE
Status: COMPLETED | OUTPATIENT
Start: 2023-08-03 | End: 2023-08-03

## 2023-08-03 RX ADMIN — DEXAMETHASONE SODIUM PHOSPHATE 4 MG: 4 INJECTION, SOLUTION INTRA-ARTICULAR; INTRALESIONAL; INTRAMUSCULAR; INTRAVENOUS; SOFT TISSUE at 08:26

## 2023-08-03 RX ADMIN — LIDOCAINE HYDROCHLORIDE 1 ML: 20 INJECTION, SOLUTION INFILTRATION; PERINEURAL at 08:26

## 2023-08-03 RX ADMIN — DEXAMETHASONE SODIUM PHOSPHATE 4 MG: 4 INJECTION, SOLUTION INTRA-ARTICULAR; INTRALESIONAL; INTRAMUSCULAR; INTRAVENOUS; SOFT TISSUE at 08:25

## 2023-08-03 ASSESSMENT — PAIN SCALES - GENERAL: PAINLEVEL: MILD PAIN (3)

## 2023-08-03 NOTE — PATIENT INSTRUCTIONS
What are Prescription Custom Orthotics?  Custom orthotics are specially-made devices designed to support and comfort your feet. Prescription orthotics are crafted for you and no one else. They match the contours of your feet precisely and are designed for the way you move. Orthotics are only manufactured after a podiatrist has conducted a complete evaluation of your feet, ankles, and legs, so the orthotic can accommodate your unique foot structure and pathology.  Prescription orthotics are divided into two categories:  Functional orthotics are designed to control abnormal motion. They may be used to treat foot pain caused by abnormal motion; they can also be used to treat injuries such as shin splints or tendinitis. Functional orthotics are usually crafted of a semi-rigid material such as plastic or graphite.  Accommodative orthotics are softer and meant to provide additional cushioning and support. They can be used to treat diabetic foot ulcers, painful calluses on the bottom of the foot, and other uncomfortable conditions.  Podiatrists use orthotics to treat foot problems such as plantar fasciitis, bursitis, tendinitis, diabetic foot ulcers, and foot, ankle, and heel pain. Clinical research studies have shown that podiatrist-prescribed foot orthotics decrease foot pain and improve function.  Orthotics typically cost more than shoe inserts purchased in a retail store, but the additional cost is usually well worth it. Unlike shoe inserts, orthotics are molded to fit each individual foot, so you can be sure that your orthotics fit and do what they're supposed to do. Prescription orthotics are also made of top-notch materials and last many years when cared for properly. Insurance often helps pay for prescription orthotics.  What are Shoe Inserts?   You've seen them at the grocery store and at the mall. You've probably even seen them on TV and online. Shoe inserts are any kind of non-prescription foot support designed  to be worn inside a shoe. Pre-packaged, mass produced, arch supports are shoe inserts. So are the  custom-made  insoles and foot supports that you can order online or at retail stores. Unless the device has been prescribed by a doctor and crafted for your specific foot, it's a shoe insert, not a custom orthotic device--despite what the ads might say.  Shoe inserts can be very helpful for a variety of foot ailments, including flat arches and foot and leg pain. They can cushion your feet, provide comfort, and support your arches, but they can't correct biomechanical foot problems or cure long-standing foot issues.  The most common types of shoe inserts are:  Arch supports: Some people have high arches. Others have low arches or flat feet. Arch supports generally have a  bumped-up  appearance and are designed to support the foot's natural arch.   Insoles: Insoles slip into your shoe to provide extra cushioning and support. Insoles are often made of gel, foam, or plastic.   Heel liners: Heel liners, sometimes called heel pads or heel cups, provide extra cushioning in the heel region. They may be especially useful for patients who have foot pain caused by age-related thinning of the heels' natural fat pads.   Foot cushions: Do your shoes rub against your heel or your toes? Foot cushions come in many different shapes and sizes and can be used as a barrier between you and your shoe.  Choosing an Over-the-Counter Shoe Insert  Selecting a shoe insert from the wide variety of devices on the market can be overwhelming. Here are some podiatrist-tested tips to help you find the insert that best meets your needs:  Consider your health. Do you have diabetes? Problems with circulation? An over-the-counter insert may not be your best bet. Diabetes and poor circulation increase your risk of foot ulcers and infections, so schedule an appointment with a podiatrist. He or she can help you select a solution that won't cause additional  health problems.   Think about the purpose. Are you planning to run a marathon, or do you just need a little arch support in your work shoes? Look for a product that fits your planned level of activity.   Bring your shoes. For the insert to be effective, it has to fit into your shoes. So bring your sneakers, dress shoes, or work boots--whatever you plan to wear with your insert. Look for an insert that will fit the contours of your shoe.   Try them on. If all possible, slip the insert into your shoe and try it out. Walk around a little. How does it feel? Don't assume that feelings of pressure will go away with continued wear. (If you can't try the inserts at the store, ask about the store's return policy and hold on to your receipt.)    Please call one of the [facility] locations below to schedule an appointment. If you received a prescription please bring it with you to your appointment. Some locations are limited to what they carry.    Office Locations    [facility]  [address] [place] [address]  Home Medical Equipment, [address]   Phone: [phone]   Orthotics and Prosthetics, [address]   Phone: [phone]    [facility]   Home Medical Equipment   [address] [address]  Phone :[phone]  Orthotics and Prosthetics   [address], [address], [facility] [address]  Phone:[phone]          [facility]  [address]   [place] [address]   Phone: [phone]    [facility] Professional Bldg[facility]  [address]  [place] [address]  Phone: [phone]    [facility] Medical [facility]ldg[facility]   [address]  [place] [address]  Phone: [phone]    [facility]  [address]  [place] [address]  Phone:  [phone]    [facility]  [address]  [place] [address]  Phone: [phone]    [place]   [address] [place] [address]   Phone: [phone]    WEARING YOUR CUSTOM FOOT ORTHOTICS   Most insurance plans cover one pair of orthotics per year. You must check with your   insurance plan to see what your payment responsibility will be. Please call your   insurance company by calling the number on the back of your insurance card.   Orthotic's are non-refundable and non-returnable.   Orthotics are made of various designs. Some orthotics are covered with material that extends beyond your toes. If your orthotic is of this design, you will likely need to trim the toe end to get a proper fit. The insole from your shoe can be used as a template. Simply overlay the shoe insert on top of the custom orthotic. Align the heel end while tracing the length of the insert onto the custom orthotic. Use a large scissor to trim the toe end until you get a proper fit in the shoe.   The orthotic needs to be pushed as far back in the shoe as possible. The heel portion should not ride forward so as not to irritate your heel.   Orthotics are designed to work with socks. Excessive perspiration will shorten the life span of the orthotics. Remove the orthotic from the shoe frequently for proper drying.   The break-in period lasts for weeks. People new to orthotics will likely experience new aches and pains. The orthotic is forcing your foot into a new position. Arch, foot and leg muscle aches and fatigue are common during these weeks. Minor discomfort can be considered normal break in phenomenon. Start wearing your orthotic around your home your first day. Limited activity for one to two hours is recommended. You can increase one or two additional hours each day provided the aches and pains are subsiding. The degree of discomfort, fatigue and problems will dictate the speed of break  in. You may require multiple weeks to work up to full time use.   Do not continue wearing your orthotics if they are creating problems such as blisters or sores. Do not hesitate to call the clinic to speak with a nurse regarding orthotic   break in, fit, trimming, etc. You may also need to see the doctor if the orthotics are   simply not working out. Adjustments are sometimes made to improve orthotic   function.     Orthotics will only work in certain styles and types of shoes. Orthotics rarely work in dress shoes. Slip-ons, clogs, sandals and heels are particularly troublesome. Specially designed orthotics may be necessary for these types of shoes. Your custom orthotic was designed for activities that require appropriate walking or running shoes. Lace up athletic shoes, walking shoes or work boots should work appropriately. You may need a wider or longer shoe. Shoes with a removable  or insert work best. In general, you want to remove an insert from the shoe before placing the orthotic into the shoe. Shoes without a removable liner may not work as well.     When purchasing new shoes, bring your orthotics along to get a proper fit. Shop at stores that are familiar with orthotics.   Frequent washing of the orthotic may shorten the life span of the top cover. The top cover can be replaced but will generally last one to five years depending on use and foot perspiration.

## 2023-08-03 NOTE — PROGRESS NOTES
FOOT AND ANKLE SURGERY/PODIATRY CONSULT NOTE         ASSESSMENT:   Plantar fasciitis bilateral feet      TREATMENT:  The patient was given a cortisone injection in both heels today each consisting of 1 cc of dexamethasone sodium phosphate and 1 cc of 2% lidocaine plain.  I have also recommended orthotics.  The patient is to return to the clinic in 1 week if his pain persist at which time I would recommend a second cortisone injection.        HPI: I was asked to see Jeromy Valdivia today to evaluate and treat bilateral heel pain.  The patient indicated that he has had severe heel pain for the past year.  The pain is located on the bottom of both heels and is aggravated with weightbearing and ambulation.  The pain is more pronounced when he first gets out of bed in the mornings.  He denies any trauma to his feet.  He denies any associated redness or swelling.  The pain is relieved with nonweightbearing.  He describes it as a aching type pain.  He denies any other previous treatment.  The patient stated he stands for several hours at work which also aggravates both feet.  The patient was seen in consultation at the request of Isabel Archer PA-C for evaluation and treatment of bilateral heel pain.     History reviewed. No pertinent past medical history.    Social History     Socioeconomic History    Marital status: Single     Spouse name: Not on file    Number of children: Not on file    Years of education: Not on file    Highest education level: Not on file   Occupational History    Not on file   Tobacco Use    Smoking status: Every Day     Types: Cigarettes    Smokeless tobacco: Former   Substance and Sexual Activity    Alcohol use: Yes    Drug use: Never    Sexual activity: Not on file   Other Topics Concern    Not on file   Social History Narrative    Not on file     Social Determinants of Health     Financial Resource Strain: Not on file   Food Insecurity: Not on file   Transportation Needs: Not on file   Physical Activity:  Not on file   Stress: Not on file   Social Connections: Not on file   Intimate Partner Violence: Not on file   Housing Stability: Not on file          Allergies   Allergen Reactions    Flagyl [Metronidazole] Hives          Current Outpatient Medications:     allopurinol (ZYLOPRIM) 100 MG tablet, Take 1 tablet (100 mg) by mouth daily, Disp: 90 tablet, Rfl: 3    gabapentin (NEURONTIN) 300 MG capsule, Take 1 capsule (300 mg) by mouth 3 times daily as needed (Patient not taking: Reported on 7/17/2023), Disp: 40 capsule, Rfl: 0    oxyCODONE (ROXICODONE) 5 MG tablet, Take 1 tablet (5 mg) by mouth every 6 hours as needed for severe pain (Patient not taking: Reported on 7/17/2023), Disp: 20 tablet, Rfl: 0     No family history on file.     Social History     Socioeconomic History    Marital status: Single     Spouse name: Not on file    Number of children: Not on file    Years of education: Not on file    Highest education level: Not on file   Occupational History    Not on file   Tobacco Use    Smoking status: Every Day     Types: Cigarettes    Smokeless tobacco: Former   Substance and Sexual Activity    Alcohol use: Yes    Drug use: Never    Sexual activity: Not on file   Other Topics Concern    Not on file   Social History Narrative    Not on file     Social Determinants of Health     Financial Resource Strain: Not on file   Food Insecurity: Not on file   Transportation Needs: Not on file   Physical Activity: Not on file   Stress: Not on file   Social Connections: Not on file   Intimate Partner Violence: Not on file   Housing Stability: Not on file        Review of Systems - Patient denies fever, chills, rash, wound, stiffness, limping, numbness, weakness, heart burn, blood in stool, chest pain with activity, calf pain when walking, shortness of breath with activity, chronic cough, easy bleeding/bruising, swelling of ankles, excessive thirst, fatigue, depression, anxiety.  Patient admits to bilateral heel  "pain.      OBJECTIVE:  Appearance: alert, well appearing, and in no distress.    Resp 16   Ht 1.575 m (5' 2\")   Wt 88.9 kg (196 lb)   BMI 35.85 kg/m       Body mass index is 35.85 kg/m .     General appearance: Patient is alert and fully cooperative with history & exam.  No sign of distress is noted during the visit.  Psychiatric: Affect is pleasant & appropriate.  Patient appears motivated to improve health.  Respiratory: Breathing is regular & unlabored while sitting.  HEENT: Hearing is intact to spoken word.  Speech is clear.  No gross evidence of visual impairment that would impact ambulation.    Vascular: Dorsalis pedis and posterior tibial pulses are palpable. There is no pedal hair growth bilaterally.  CFT < 3 sec from anterior tibial surface to distal digits bilaterally. There is no appreciable edema noted.  Dermatologic: Turgor and texture are within normal limits. No coloration or temperature changes. No primary or secondary lesions noted.  Neurologic: All epicritic and proprioceptive sensations are grossly intact bilaterally.  Musculoskeletal: All active and passive ankle, subtalar, midtarsal, and 1st MPJ range of motion are grossly intact without pain or crepitus, with the exception of none. Manual muscle strength is within normal limits bilaterally. All dorsiflexors, plantarflexors, invertors, evertors are intact bilaterally. Tenderness present to the plantar medial aspect of both heels on palpation.  No tenderness to bilateral feet or ankles with range of motion. Calf is soft/non-tender without warmth/induration    Imaging:       No images are attached to the encounter or orders placed in the encounter.     No results found.   No results found.           Rios Renteria DPM  [facility] Foot & Ankle Surgery/Podiatry     "

## 2023-08-03 NOTE — Clinical Note
"    8/3/2023         RE: Jeromy Valdivia  [address]  [address]  [place] [address]        Dear Colleague,    Thank you for referring your patient, Jeromy Valdivia, to the [facility]. Please see a copy of my visit note below.    FOOT AND ANKLE SURGERY/PODIATRY CONSULT NOTE         ASSESSMENT:   ***      TREATMENT:  ***        HPI: I was asked to see Jeromy Valdivia today  ***.  The patient was seen in consultation at the request of Isabel Archer PA-C for evaluation and treatment of bilateral heel pain.     {PAST MEDICAL HISTORY:[id number]::\"***\"}    {SOCIAL HISTORY:[id number]::\"***\"}       Allergies   Allergen Reactions     Flagyl [Metronidazole] Hives          Current Outpatient Medications:      allopurinol (ZYLOPRIM) 100 MG tablet, Take 1 tablet (100 mg) by mouth daily, Disp: 90 tablet, Rfl: 3     gabapentin (NEURONTIN) 300 MG capsule, Take 1 capsule (300 mg) by mouth 3 times daily as needed (Patient not taking: Reported on 7/17/2023), Disp: 40 capsule, Rfl: 0     oxyCODONE (ROXICODONE) 5 MG tablet, Take 1 tablet (5 mg) by mouth every 6 hours as needed for severe pain (Patient not taking: Reported on 7/17/2023), Disp: 20 tablet, Rfl: 0     No family history on file.     Social History     Socioeconomic History     Marital status: Single     Spouse name: Not on file     Number of children: Not on file     Years of education: Not on file     Highest education level: Not on file   Occupational History     Not on file   Tobacco Use     Smoking status: Every Day     Types: Cigarettes     Smokeless tobacco: Former   Substance and Sexual Activity     Alcohol use: Yes     Drug use: Never     Sexual activity: Not on file   Other Topics Concern     Not on file   Social History Narrative     Not on file     Social Determinants of Health     Financial Resource Strain: Not on file   Food Insecurity: Not on file   Transportation Needs: Not on file   Physical Activity: Not on file   Stress: Not on file   Social Connections: " "Not on file   Intimate Partner Violence: Not on file   Housing Stability: Not on file        Review of Systems - Patient denies fever, chills, rash, wound, stiffness, limping, numbness, weakness, heart burn, blood in stool, chest pain with activity, calf pain when walking, shortness of breath with activity, chronic cough, easy bleeding/bruising, swelling of ankles, excessive thirst, fatigue, depression, anxiety.  Patient admits to ***.      OBJECTIVE:  Appearance: {appearance:511211::\"alert, well appearing, and in no distress\"}.    Resp 16   Ht 1.575 m (5' 2\")   Wt 88.9 kg (196 lb)   BMI 35.85 kg/m       Body mass index is 35.85 kg/m .     General appearance: Patient is alert and fully cooperative with history & exam.  No sign of distress is noted during the visit.  Psychiatric: Affect is pleasant & appropriate.  Patient appears motivated to improve health.  Respiratory: Breathing is regular & unlabored while sitting.  HEENT: Hearing is intact to spoken word.  Speech is clear.  No gross evidence of visual impairment that would impact ambulation.    Vascular: Dorsalis pedis and posterior tibial pulses are palpable. There is pedal hair growth ***.  CFT < 3 sec from anterior tibial surface to distal digits ***. There is no appreciable edema noted.  Dermatologic: Turgor and texture are within normal limits. No coloration or temperature changes. No primary or secondary lesions noted.  Neurologic: All epicritic and proprioceptive sensations are grossly intact ***.  Musculoskeletal: All active and passive ankle, subtalar, midtarsal, and 1st MPJ range of motion are grossly intact without pain or crepitus, with the exception of ***. Manual muscle strength is ***. All dorsiflexors, plantarflexors, invertors, evertors are intact ***. Tenderness present to *** on palpation. Tenderness to *** with range of motion. Calf is soft/non-tender with/without warmth/induration    Imaging:     {Imaging order/result:[id number]}  No images " are attached to the encounter or orders placed in the encounter.     No results found.   No results found.       TREATMENT:  ***    Rios Renteria DPM  [facility] Foot & Ankle Surgery/Podiatry         Again, thank you for allowing me to participate in the care of your patient.        Sincerely,        Rios Horta DPM

## 2023-08-07 ENCOUNTER — OFFICE VISIT (OUTPATIENT)
Dept: PHYSICAL MEDICINE AND REHAB | Facility: CLINIC | Age: 38
End: 2023-08-07
Payer: COMMERCIAL

## 2023-08-07 VITALS — SYSTOLIC BLOOD PRESSURE: 115 MMHG | DIASTOLIC BLOOD PRESSURE: 76 MMHG | HEART RATE: 89 BPM

## 2023-08-07 DIAGNOSIS — M54.16 LUMBAR RADICULITIS: ICD-10-CM

## 2023-08-07 DIAGNOSIS — M79.18 MYOFASCIAL PAIN: ICD-10-CM

## 2023-08-07 DIAGNOSIS — M51.26 LUMBAR DISC HERNIATION: Primary | ICD-10-CM

## 2023-08-07 DIAGNOSIS — M47.816 SPONDYLOSIS OF LUMBAR REGION WITHOUT MYELOPATHY OR RADICULOPATHY: ICD-10-CM

## 2023-08-07 PROCEDURE — 99214 OFFICE O/P EST MOD 30 MIN: CPT | Performed by: PAIN MEDICINE

## 2023-08-07 RX ORDER — METHOCARBAMOL 500 MG/1
500 TABLET, FILM COATED ORAL 4 TIMES DAILY PRN
Qty: 30 TABLET | Refills: 3 | Status: SHIPPED | OUTPATIENT
Start: 2023-08-07 | End: 2023-12-15

## 2023-08-07 ASSESSMENT — PAIN SCALES - GENERAL: PAINLEVEL: MODERATE PAIN (5)

## 2023-08-07 NOTE — LETTER
August 7, 2023      Jeromy Valdivia  [address]  [address]  [place] [address]        To Whom It May Concern:    Jeromy Valdivia was seen in our clinic. He may return to work with the following: limited to light duty - lifting no greater than 25 pounds with 2 additional 15-minute work breaks per 8-hour shift.  These restrictions will be in place for the next 4 weeks.    Sincerely,        Sanjeev Robins, DO

## 2023-08-07 NOTE — PROGRESS NOTES
Assessment:     Diagnoses and all orders for this visit:  Lumbar disc herniation  -     Physical Therapy Referral; Future  Lumbar radiculitis  -     Physical Therapy Referral; Future  Myofascial pain  -     Physical Therapy Referral; Future  -     methocarbamol (ROBAXIN) 500 MG tablet; Take 1 tablet (500 mg) by mouth 4 times daily as needed for muscle spasms  Spondylosis of lumbar region without myelopathy or radiculopathy  -     Physical Therapy Referral; Future     Jeromy Valdivia is a 37 year old y.o. male with past medical history significant for acute midline low back pain without sciatica, lumbar disc herniation who presents  who presents today for follow-up regarding low back:    -Low back pain began to return roughly 1 week ago.  Pain is likely secondary to lumbar disc herniation versus lumbar spondylosis without myelopathy.  I reordered physical therapy for him as well as methocarbamol.  Should pain continue despite these options that I recommend MRI of the lumbar spine.     Plan:     A shared decision making plan was used. The patient's values and choices were respected. Prior medical records from Isabel Archer's last note on 7/17/2023 were reviewed today. The following represents what was discussed and decided upon by the provider and the patient.        -DIAGNOSTIC TESTS: Images were personally reviewed and interpreted.   --MRI of the lumbar spine dated 10/22/2021 is personally viewed images interpreted discussed with patient. There are no findings to suggest acute fracture. There is no high-grade stenosis or foraminal stenosis. At L5-S1 there is a right-sided disc protrusion likely in contact with the descending right S1 nerve root. At L3-4 there is mild right lateral recess stenosis. There is lumbar spondylosis.     -INTERVENTIONS: No interventions at this time.    -MEDICATIONS: I ordered methocarbamol.  -  Discussed side effects of medications and proper use. Patient verbalized understanding.    -PHYSICAL  THERAPY: Patient completed 6 sessions of physical therapy for lumbar radicular pain ending December 22, 2021. He still does his home exercises.  I reordered physical therapy for the patient.  Discussed the importance of core strengthening, ROM, stretching exercises with the patient and how each of these entities is important in decreasing pain.  Explained to the patient that the purpose of physical therapy is to teach the patient a home exercise program.  These exercises need to be performed every day in order to decrease pain and prevent future occurrences of pain.        -PATIENT EDUCATION: We discussed pain management in a multiple fashion including physical therapy, medication management, possible future injections.    -FOLLOW UP: Patient will follow-up in 4 weeks with Isabel Archer or myself.  Advised to contact clinic if symptoms worsen or change.    Subjective:     Jeromy Valdivia is a 37 year old male who presents today for follow-up regarding low back pain.  Patient notes that he received about 50% relief up until last week and now pain is returning.  Pain today is 5/10 is worse 8/10 as best as 4/10.  Pain is worse with moving and bending as well as prolonged sitting.  Laying down improves pain.  Pain is somewhat improved with as needed oxycodone and gabapentin.  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  Lifting heavy weight also is painful for the patient.  He denies any bowel or bladder changes, fevers, chills, unintentional weight loss.    Evaluation to Date: MRI of the lumbar spine dated 10/22/2021.     Treatment to Date:   - 6 sessions physical therapy for lumbar radicular pain ending December 22, 2021.  He still does home exercises.  - Right L5-S1 and S1-S2 transforaminal epidural steroid injections March 17, 2023 provided 80% relief of pain for 2 months  - Bilateral L5-S1 transforaminal epidural steroid injections June 21, 2023 with 50% relief  - Gabapentin was  helpful   - Cyclobenzaprine 10 mg   - Oxycodone   - Medrol Dosepak    Patient Active Problem List   Diagnosis    Acute midline low back pain without sciatica    Lumbar disc herniation    Gout involving toe of left foot       Current Outpatient Medications   Medication    methocarbamol (ROBAXIN) 500 MG tablet    allopurinol (ZYLOPRIM) 100 MG tablet    gabapentin (NEURONTIN) 300 MG capsule    oxyCODONE (ROXICODONE) 5 MG tablet     No current facility-administered medications for this visit.       Allergies   Allergen Reactions    Flagyl [Metronidazole] Hives       No past medical history on file.     Review of Systems  ROS:  Specifically negative for bowel/bladder dysfunction, balance changes, headache, dizziness, foot drop, fevers, chills, appetite changes, nausea/vomiting, unexplained weight loss. Otherwise 13 systems reviewed are negative. Please see the patient's intake questionnaire from today for details.    Reviewed Social, Family, Past Medical and Past Surgical history with patient, no significant changes noted since prior visit.     Objective:     /76   Pulse 89     PHYSICAL EXAMINATION:    --CONSTITUTIONAL: Well developed, well nourished, healthy appearing individual.  --PSYCHIATRIC: Appropriate mood and affect. No difficulty interacting due to temper, social withdrawal, or memory issues.  --RESPIRATORY: Normal rhythm and effort. No abnormal accessory muscle breathing patterns noted.   --MUSCULOSKELETAL:  Normal lumbar lordosis noted, no lateral shift.  --GROSS MOTOR: Easily arises from a seated position. Gait is non-antalgic  --LUMBAR SPINE:  Inspection reveals no evidence of deformity. Range of motion is mildly limited in lumbar flexion, extension, lateral rotation.  Tenderness to palpation across the low back.  Straight leg raising in the seated the position is negative to radicular pain. Sciatic notch non-tender.   --SACROILIAC JOINT:  One Finger point test negative.  --LOWER EXTREMITY MOTOR  TESTING:  Plantar flexion left 5/5, right 5/5   Dorsiflexion left 5/5, right 5/5   Great toe MTP extension left 5/5, right 5/5  Knee flexion left 5/5, right 5/5  Knee extension left 5/5, right 5/5   Hip flexion left 5/5, right 5/5  Hip abduction left 5/5, right 5/5  Hip adduction left 5/5, right 5/5   --NEUROLOGIC: Sensation to light touch is intact in the bilateral L4, L5, and S1 dermatomes.    RESULTS:   Imaging: Lumbar spine imaging was reviewed today.

## 2023-08-07 NOTE — LETTER
8/7/2023         RE: Jeromy Valdivia  [address]  [address]  [place] [address]        Dear Colleague,    Thank you for referring your patient, Jeromy Valdivia, to the [facility] SPINE AND NEUROSURGERY. Please see a copy of my visit note below.      Assessment:     Diagnoses and all orders for this visit:  Lumbar disc herniation  -     Physical Therapy Referral; Future  Lumbar radiculitis  -     Physical Therapy Referral; Future  Myofascial pain  -     Physical Therapy Referral; Future  -     methocarbamol (ROBAXIN) 500 MG tablet; Take 1 tablet (500 mg) by mouth 4 times daily as needed for muscle spasms  Spondylosis of lumbar region without myelopathy or radiculopathy  -     Physical Therapy Referral; Future     Jeromy Valdivia is a 37 year old y.o. male with past medical history significant for acute midline low back pain without sciatica, lumbar disc herniation who presents  who presents today for follow-up regarding low back:    -Low back pain began to return roughly 1 week ago.  Pain is likely secondary to lumbar disc herniation versus lumbar spondylosis without myelopathy.  I reordered physical therapy for him as well as methocarbamol.  Should pain continue despite these options that I recommend MRI of the lumbar spine.     Plan:     A shared decision making plan was used. The patient's values and choices were respected. Prior medical records from Isabel Archer's last note on 7/17/2023 were reviewed today. The following represents what was discussed and decided upon by the provider and the patient.        -DIAGNOSTIC TESTS: Images were personally reviewed and interpreted.   --MRI of the lumbar spine dated 10/22/2021 is personally viewed images interpreted discussed with patient. There are no findings to suggest acute fracture. There is no high-grade stenosis or foraminal stenosis. At L5-S1 there is a right-sided disc protrusion likely in contact with the descending right S1 nerve root. At L3-4 there is mild right lateral  recess stenosis. There is lumbar spondylosis.     -INTERVENTIONS: No interventions at this time.    -MEDICATIONS: I ordered methocarbamol.  -  Discussed side effects of medications and proper use. Patient verbalized understanding.    -PHYSICAL THERAPY: Patient completed 6 sessions of physical therapy for lumbar radicular pain ending December 22, 2021. He still does his home exercises.  I reordered physical therapy for the patient.  Discussed the importance of core strengthening, ROM, stretching exercises with the patient and how each of these entities is important in decreasing pain.  Explained to the patient that the purpose of physical therapy is to teach the patient a home exercise program.  These exercises need to be performed every day in order to decrease pain and prevent future occurrences of pain.        -PATIENT EDUCATION: We discussed pain management in a multiple fashion including physical therapy, medication management, possible future injections.    -FOLLOW UP: Patient will follow-up in 4 weeks with Isabel Archer or myself.  Advised to contact clinic if symptoms worsen or change.    Subjective:     Jeromy Valdivia is a 37 year old male who presents today for follow-up regarding low back pain.  Patient notes that he received about 50% relief up until last week and now pain is returning.  Pain today is 5/10 is worse 8/10 as best as 4/10.  Pain is worse with moving and bending as well as prolonged sitting.  Laying down improves pain.  Pain is somewhat improved with as needed oxycodone and gabapentin.  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  Lifting heavy weight also is painful for the patient.  He denies any bowel or bladder changes, fevers, chills, unintentional weight loss.    Evaluation to Date: MRI of the lumbar spine dated 10/22/2021.     Treatment to Date:   - 6 sessions physical therapy for lumbar radicular pain ending December 22, 2021.  He still does home  exercises.  - Right L5-S1 and S1-S2 transforaminal epidural steroid injections March 17, 2023 provided 80% relief of pain for 2 months  - Bilateral L5-S1 transforaminal epidural steroid injections June 21, 2023 with 50% relief  - Gabapentin was helpful   - Cyclobenzaprine 10 mg   - Oxycodone   - Medrol Dosepak    Patient Active Problem List   Diagnosis     Acute midline low back pain without sciatica     Lumbar disc herniation     Gout involving toe of left foot       Current Outpatient Medications   Medication     methocarbamol (ROBAXIN) 500 MG tablet     allopurinol (ZYLOPRIM) 100 MG tablet     gabapentin (NEURONTIN) 300 MG capsule     oxyCODONE (ROXICODONE) 5 MG tablet     No current facility-administered medications for this visit.       Allergies   Allergen Reactions     Flagyl [Metronidazole] Hives       No past medical history on file.     Review of Systems  ROS:  Specifically negative for bowel/bladder dysfunction, balance changes, headache, dizziness, foot drop, fevers, chills, appetite changes, nausea/vomiting, unexplained weight loss. Otherwise 13 systems reviewed are negative. Please see the patient's intake questionnaire from today for details.    Reviewed Social, Family, Past Medical and Past Surgical history with patient, no significant changes noted since prior visit.     Objective:     /76   Pulse 89     PHYSICAL EXAMINATION:    --CONSTITUTIONAL: Well developed, well nourished, healthy appearing individual.  --PSYCHIATRIC: Appropriate mood and affect. No difficulty interacting due to temper, social withdrawal, or memory issues.  --RESPIRATORY: Normal rhythm and effort. No abnormal accessory muscle breathing patterns noted.   --MUSCULOSKELETAL:  Normal lumbar lordosis noted, no lateral shift.  --GROSS MOTOR: Easily arises from a seated position. Gait is non-antalgic  --LUMBAR SPINE:  Inspection reveals no evidence of deformity. Range of motion is mildly limited in lumbar flexion, extension,  lateral rotation.  Tenderness to palpation across the low back.  Straight leg raising in the seated the position is negative to radicular pain. Sciatic notch non-tender.   --SACROILIAC JOINT:  One Finger point test negative.  --LOWER EXTREMITY MOTOR TESTING:  Plantar flexion left 5/5, right 5/5   Dorsiflexion left 5/5, right 5/5   Great toe MTP extension left 5/5, right 5/5  Knee flexion left 5/5, right 5/5  Knee extension left 5/5, right 5/5   Hip flexion left 5/5, right 5/5  Hip abduction left 5/5, right 5/5  Hip adduction left 5/5, right 5/5   --NEUROLOGIC: Sensation to light touch is intact in the bilateral L4, L5, and S1 dermatomes.    RESULTS:   Imaging: Lumbar spine imaging was reviewed today.                       Again, thank you for allowing me to participate in the care of your patient.        Sincerely,        Sanjeev Robins, DO

## 2023-08-08 ENCOUNTER — OFFICE VISIT (OUTPATIENT)
Dept: FAMILY MEDICINE | Facility: CLINIC | Age: 38
End: 2023-08-08
Payer: COMMERCIAL

## 2023-08-08 VITALS
WEIGHT: 197 LBS | OXYGEN SATURATION: 96 % | HEART RATE: 97 BPM | SYSTOLIC BLOOD PRESSURE: 118 MMHG | HEIGHT: 62 IN | BODY MASS INDEX: 36.25 KG/M2 | DIASTOLIC BLOOD PRESSURE: 78 MMHG | RESPIRATION RATE: 16 BRPM

## 2023-08-08 DIAGNOSIS — Z11.4 SCREENING FOR HIV (HUMAN IMMUNODEFICIENCY VIRUS): Primary | ICD-10-CM

## 2023-08-08 DIAGNOSIS — Z11.59 NEED FOR HEPATITIS C SCREENING TEST: ICD-10-CM

## 2023-08-08 PROCEDURE — 99214 OFFICE O/P EST MOD 30 MIN: CPT | Performed by: NURSE PRACTITIONER

## 2023-08-08 ASSESSMENT — PAIN SCALES - GENERAL: PAINLEVEL: EXTREME PAIN (8)

## 2023-08-08 NOTE — PATIENT INSTRUCTIONS
Work letter provided.    If additional paperwork needs to be done I'll take care of that.    I'll message your spine team to let them know the shot isn't lasting and once I hear back will update you with what I found.

## 2023-08-08 NOTE — LETTER
To whom it may concern,          Please excuse Jeromy Valdivia from work for days missed on 8/8/23 through 8/12/23 due to medical issues.    Don't hesitate to reach out to met if there are any questions or concerns.        Juan José Roe, CNP

## 2023-08-08 NOTE — PROGRESS NOTES
"Assessment & Plan     ICD-10-CM    1. Screening for HIV (human immunodeficiency virus)  Z11.4       2. Need for hepatitis C screening test  Z11.59           Persistent low back pain.  Worsening despite having an injection less than 7 weeks ago.  Has been doing physical therapy exercises at home and symptoms continue to impede his ability to work and perform ADLs.  Discussed trying Lyrica in place of gabapentin.  He would like to keep with the gabapentin for now which was reasonable.  Using oxycodone appropriately.  I will message spine team to see if we can push up the MRI because this is impacting his employment.  Discussed he may need FMLA which I would be happy to take care of.    Reviewed spine care note x3, podiatry x1.     Subjective     HPI     Back Pain:  He presents for follow up of back pain. Patient's back pain is a recurring problem.  Location of back pain:  Right lower back, left lower back, right middle of back and left middle of back  Description of back pain: burning, cramping, dull ache, sharp, shooting and stabbing  Back pain spreads: nowhere    Went to work today with the pinch feeling and then his back went out so he called out from work. Has missed a few days for this already.  Pain still in the lower back.  No sciatica.  Went to podiatry for plantar fasciitis and have significant relief of pain with the injection.  Because of the low back pain he's been avoiding activities out of concern that he may aggravate the back pain.      He's been trying more home stretches that he learned from PT in 2021 but symptoms are worsening despite this.      Review of Systems - negative except for what's listed in the HPI      Objective    /78   Pulse 97   Resp 16   Ht 1.575 m (5' 2\")   Wt 89.4 kg (197 lb)   SpO2 96%   BMI 36.03 kg/m    Physical Exam   General appearance - alert, well appearing, and in no distress  Mental status - alert, oriented to person, place, and time  Neurological -grossly " intact  Musculoskeletal -slight stooped posture when sitting.  Slow sit to stand.  Gait steady.    Extremities - no peripheral edema  Skin - normal coloration and turgor.    Juan José Roe, CNP    This note has been dictated using voice recognition software. Any grammatical or context distortions are unintentional and inherent to the software.

## 2023-08-10 DIAGNOSIS — M54.16 LUMBAR RADICULITIS: ICD-10-CM

## 2023-08-10 DIAGNOSIS — M51.26 LUMBAR DISC HERNIATION: Primary | ICD-10-CM

## 2023-08-19 ENCOUNTER — HOSPITAL ENCOUNTER (OUTPATIENT)
Dept: MRI IMAGING | Facility: HOSPITAL | Age: 38
Discharge: HOME | End: 2023-08-19
Attending: PHYSICIAN ASSISTANT | Admitting: PHYSICIAN ASSISTANT
Payer: COMMERCIAL

## 2023-08-19 DIAGNOSIS — M54.16 LUMBAR RADICULITIS: ICD-10-CM

## 2023-08-19 DIAGNOSIS — M51.26 LUMBAR DISC HERNIATION: ICD-10-CM

## 2023-08-19 PROCEDURE — 72148 MRI LUMBAR SPINE W/O DYE: CPT

## 2023-08-21 ENCOUNTER — TELEPHONE (OUTPATIENT)
Dept: PHYSICAL MEDICINE AND REHAB | Facility: CLINIC | Age: 38
End: 2023-08-21
Payer: COMMERCIAL

## 2023-08-21 NOTE — TELEPHONE ENCOUNTER
Spoke to pt regarding results. Pt verbalized understanding and was transferred to  to schedule follow up with Isabel.

## 2023-08-21 NOTE — TELEPHONE ENCOUNTER
----- Message from Isabel Archer PA-C sent at 8/21/2023 10:32 AM CDT -----  Please call this patient and let him know that I reviewed his MRI lumbar spine.  This appears stable compared to his MRI from 2021.  There are mild disc bulges L3-4 through L5-S1.  Recommended the patient follow-up with me to review the results and discuss treatment options.

## 2023-09-01 NOTE — PROGRESS NOTES
Assessment:   Jeromy Valdivia is a 37 year old y.o. male who is otherwise healthy who presents today for follow-up regarding a flare of chronic low back pain.  Pain flared up yesterday when he stood up from kneeling at work.  Current pain is most consistent with lumbar spasm.  He has significant hypertonicity right lumbar paraspinous muscles with lumbar shift.  Suspect he is symptomatic from underlying lumbar facet arthropathy.  - Patient also has chronic lower extremity pain.  Lower extremity pain involves the heels and posterior calves.  It does not seem radicular in nature.  My review of an MRI lumbar spine from shows shallow disc bulges at L3-4, L4-5, and L5-S1.  There is mild spinal canal stenosis L3-4 and L4-5.  Suspect lower extremity pain is more so related to planter fasciitis.  He had recent injections for plantar fasciitis which were somewhat helpful and he has been changing his shoes/insoles which has been somewhat helpful for leg pain.    PSP: Dr. Robins  Plan:     A shared decision making plan was used.  The patient's values and choices were respected.  The following represents what was discussed and decided upon by the physician assistant and the patient.      1.  DIAGNOSTIC TESTS: I reviewed the MRI lumbar spine.  No additional diagnostic test were ordered.    2.  PHYSICAL THERAPY: Patient completed 6 sessions of physical therapy for lumbar radicular pain ending December 22, 2021.  - I entered a new referral for the patient begin [facility] physical therapy.  I think this will be more beneficial.    3.  MEDICATIONS:  - Patient should take methocarbamol 500 milligrams 3 times daily as needed.  Patient thought that he could not take this along with the gabapentin.  I reassured the patient he can take these together.  - I also refilled the patient's gabapentin 300 mg 3 times daily.  - Patient has been using oxycodone.  Would not recommend additional opiates.    4.  INTERVENTIONS: I recommend right L3, L4, L5  medial branch blocks as work-up toward radiofrequency ablation.  Patient indicated he would like to proceed and an order was placed.    5.  REFERRALS:  - Patient saw podiatry August 3, 2023.  They diagnosed with planter fasciitis and performed cortisone injections in both heels.  They recommended follow-up if pain persists at which time they would perform a second cortisone injection.    6.  WORK: I provided a work note excusing him from work through September 11, 2023.  He may return to work September 12, 2023 with light duty work restrictions.  Please see letters tab for details.  - Patient's primary care provider has offered to fill out FMLA paperwork.  I encouraged the patient to do this.    6.  FOLLOW-UP: Patient will return to the clinic for right L3, L4, L5 medial branch blocks.  If he has questions or concerns in the meantime, he should not hesitate to call.    Subjective:     Jeromy Valdivia is a 37 year old male who presents today for follow-up regarding a flare of chronic low back pain.  Pain flared up yesterday at work.  He was kneeling and upon standing up he felt severe pain right lower back.  Patient works as a .  Pain was so severe yesterday could barely walk.  He is using a walker today.    Patient complains of severe right low back pain.  Pain is looking the lower lumbar region.  Pain sometimes radiates over to the left side.  He denies any pain down the legs.  He continues to have some pain in the bilateral heels and posterior calves but states that when his back pain is more severe his legs do not bother him.  He did have injections for planter fasciitis last month which was somewhat helpful.  He feels a tingling sensation in his back but denies any numbness or tingling down the legs.  Denies any weakness down the legs.  He rates his pain today as a 10 out of 10.  At its worst it is a 10 out of 10.  At its best it is a 5 out of 10.  Pain is aggravated moving.  Pain is alleviated with lying  flat and not moving.  Denies loss of bowel or bladder control.  Denies fevers.    Treatment to date:  - 6 sessions physical therapy for lumbar radicular pain ending December 22, 2021.  He still does home exercises.  - Right L5-S1 and S1-S2 transforaminal epidural steroid injections March 17, 2023 provided 80% relief of pain for 2 months  - Bilateral L5-S1 transforaminal epidural steroid injections June 21, 2023 with 50% relief  - Gabapentin 300 mg as needed is somewhat helpful  - Cyclobenzaprine 10 mg   - Oxycodone   - Medrol Dosepak      Review of Systems:  Positive for tingling.  Negative for weakness, loss of bowel/bladder control, inability to urinate, headache, pain much worse at night, trip/stumble/falls, difficulty swallowing, difficulty with hand skills, fevers, unintentional weight loss.     Objective:   CONSTITUTIONAL:  Vital signs as above.  Patient appears to be in significant pain.  The patient is well nourished and well groomed.    PSYCHIATRIC:  The patient is awake, alert, oriented to person, place and time.  The patient is answering questions appropriately with clear speech.  Normal affect.  HEENT: Normocephalic, atraumatic.  Sclera clear.    SKIN:  Skin over the face, posterior torso, bilateral upper and lower extremities is clean, dry, intact without rashes.  VASCULAR: No significant lower extremity edema.  MUSCULOSKELETAL:  Gait is guarded.  He transitions from seated to standing and vice versa very carefully and slowly.  He walks with a shuffling gait.  Patient has a significant lumbar shift.  He has severe hypertonicity right lumbar paraspinous muscles.  No significant tenderness palpation lumbar paraspinous muscles.   The patient has 5/5 strength for the bilateral hip flexors, knee flexors/extensors, ankle dorsiflexors/plantar flexors.  NEUROLOGICAL: 1+ bilateral patellar and Achilles reflexes.  No ankle clonus bilaterally.  Sensation light touch is intact bilateral lower extremities  throughout.     RESULTS: I reviewed the MRI lumbar spine from [facility] dated August 19, 2023.  At L3-4 there is a right paracentral disc bulge with mild spinal canal stenosis.  At L4-5 there is a left paracentral disc bulge with mild spinal canal stenosis.  At L5-S1 there is a right paracentral disc bulge but no neural compromise.

## 2023-09-05 ENCOUNTER — NURSE TRIAGE (OUTPATIENT)
Dept: NURSING | Facility: CLINIC | Age: 38
End: 2023-09-05
Payer: COMMERCIAL

## 2023-09-05 NOTE — TELEPHONE ENCOUNTER
"Has a chronic back Injury about a year ago. While at work today, went out and is at home  and requesting note to miss work. Lower back pain. \"8-9 with movement. 3 at rest. Has appointment with his back specialist tomorrow.    Protocol reviewed, disposition: To be seen today in office. Patient says he cannot go in to office as he can't move. Did explain disposition to him and that He will need to contact PCP office for note or specialist for note. Call back with further questions/concerns.     GWEN GERONIMO RN  [facility] nurse advisors  9/5/2023  1:45 PM  Reason for Disposition   SEVERE back pain (e.g., excruciating, unable to do any normal activities) and not improved after pain medicine and CARE ADVICE    Additional Information   Negative: Dangerous mechanism of injury (e.g., MVA, contact sports, trampoline, diving, fall > 10 feet or 3 meters)  (Exception: Back pain began > 1 hour after injury.)   Negative: Weakness (i.e., paralysis, loss of muscle strength) of the leg(s) or foot and sudden onset after back injury   Negative: Numbness (i.e., loss of sensation) of the leg(s) or foot and sudden onset after back injury   Negative: Major bleeding (actively dripping or spurting) that can't be stopped   Negative: Bullet, knife or other serious penetrating wound   Negative: Shock suspected (e.g., cold/pale/clammy skin, too weak to stand, low BP, rapid pulse)   Negative: Sounds like a life-threatening emergency to the triager   Negative: SEVERE pain in kidney area (flank) that follows a direct blow to that site   Negative: Blood in urine (red, pink, or tea-colored)   Negative: Unable to urinate (or only a few drops) > 4 hours and bladder feels very full (e.g., palpable bladder or strong urge to urinate)   Negative: Loss of bladder or bowel control (urine or bowel incontinence; wetting self, leaking stool) of new-onset   Negative: Numbness (loss of sensation) in groin or rectal area   Negative: Skin is split open " or gaping (length > 1/2 inch or 12 mm)   Negative: Puncture wound of back   Negative: Bleeding won't stop after 10 minutes of direct pressure (using correct technique)   Negative: Sounds like a serious injury to the triager   Negative: Weakness of a leg or foot (e.g., unable to bear weight, dragging foot)   Negative: Numbness of a leg or foot (i.e., loss of sensation)    Protocols used: Back Injury-A-[place]

## 2023-09-06 ENCOUNTER — OFFICE VISIT (OUTPATIENT)
Dept: PHYSICAL MEDICINE AND REHAB | Facility: CLINIC | Age: 38
End: 2023-09-06
Payer: COMMERCIAL

## 2023-09-06 ENCOUNTER — MYC MEDICAL ADVICE (OUTPATIENT)
Dept: FAMILY MEDICINE | Facility: CLINIC | Age: 38
End: 2023-09-06

## 2023-09-06 VITALS
DIASTOLIC BLOOD PRESSURE: 83 MMHG | HEART RATE: 103 BPM | SYSTOLIC BLOOD PRESSURE: 125 MMHG | HEIGHT: 62 IN | BODY MASS INDEX: 36.25 KG/M2 | WEIGHT: 197 LBS

## 2023-09-06 DIAGNOSIS — M47.816 LUMBAR FACET ARTHROPATHY: ICD-10-CM

## 2023-09-06 DIAGNOSIS — M54.16 LUMBAR RADICULITIS: Primary | ICD-10-CM

## 2023-09-06 DIAGNOSIS — M79.18 MYOFASCIAL PAIN: ICD-10-CM

## 2023-09-06 PROCEDURE — 99214 OFFICE O/P EST MOD 30 MIN: CPT | Performed by: PHYSICIAN ASSISTANT

## 2023-09-06 RX ORDER — GABAPENTIN 300 MG/1
300 CAPSULE ORAL 3 TIMES DAILY
Qty: 90 CAPSULE | Refills: 3 | Status: SHIPPED | OUTPATIENT
Start: 2023-09-06 | End: 2023-11-24

## 2023-09-06 ASSESSMENT — PAIN SCALES - GENERAL: PAINLEVEL: WORST PAIN (10)

## 2023-09-06 NOTE — PATIENT INSTRUCTIONS
Medial Branch Block (MBB) TEST    This is a TEST injection to find out what is causing your pain.  Specifically, this test is trying to identify whichjoint in your back or neck is causing pain.   This injection will NOT provide any long term pain relief because it is just a TEST.  Steroid is NOT used for this injection.   Steroid is what gives longterm pain relief.  Since there is no steroid used, there is no long term pain relief.    You need a  for the procedure.    Because we want to determine what is causing your pain, we need you to do a few things on the day of your Medial Branch Block :     Do not take any pain medications on the day of your Medial Branch Block/Test.  Try to do activities that make our pain increase.  We want you tohave a high level of pain on the day of the test.  This makes it easier to know the results of the test.  Other information:    You may eatand drink on the day of the test.  You should take your other medications (just not pain medications) at the times you usually take them  You will be asked to fill out a pain diary for 6 hours after thetest.    AFTER THE TEST:    Please do not take any pain medications for 6 hours after the TEST.  After the pain diary is filled out, youmay resume taking your pain medications.  Please return the pain diary to the Spine Center the next day or as soon as you are able.  You will be contacted with what will happen next after the physicianreviews your pain diary.  You may be asked to come in for a follow-up appointment to discuss other treatment options  It may be recommended that you have an injection to help treatyour pain.  You may need to come in for a second set of Medial Branch Blocks (TESTS).        Please call the spine center at [phone] if you have any questions.     An order for physicaltherapy has been provided today.  Someone will call you to schedule physical therapy or you can call [phone] to schedule physical therapy.   It will be very important for you to do your physical therapy exercises on aregular basis to decrease your pain and prevent future flares of pain.

## 2023-09-06 NOTE — LETTER
9/6/2023         RE: Jeromy Valdivia  [address]  [address]  [place] [address]        Dear Colleague,    Thank you for referring your patient, Jeromy Valdivia, to the [facility]. Please see a copy of my visit note below.    Assessment:   Jeromy Valdivia is a 37 year old y.o. male who is otherwise healthy who presents today for follow-up regarding a flare of chronic low back pain.  Pain flared up yesterday when he stood up from kneeling at work.  Current pain is most consistent with lumbar spasm.  He has significant hypertonicity right lumbar paraspinous muscles with lumbar shift.  Suspect he is symptomatic from underlying lumbar facet arthropathy.  - Patient also has chronic lower extremity pain.  Lower extremity pain involves the heels and posterior calves.  It does not seem radicular in nature.  My review of an MRI lumbar spine from shows shallow disc bulges at L3-4, L4-5, and L5-S1.  There is mild spinal canal stenosis L3-4 and L4-5.  Suspect lower extremity pain is more so related to planter fasciitis.  He had recent injections for plantar fasciitis which were somewhat helpful and he has been changing his shoes/insoles which has been somewhat helpful for leg pain.    PSP: Dr. Robins  Plan:     A shared decision making plan was used.  The patient's values and choices were respected.  The following represents what was discussed and decided upon by the physician assistant and the patient.      1.  DIAGNOSTIC TESTS: I reviewed the MRI lumbar spine.  No additional diagnostic test were ordered.    2.  PHYSICAL THERAPY: Patient completed 6 sessions of physical therapy for lumbar radicular pain ending December 22, 2021.  - I entered a new referral for the patient begin [facility] physical therapy.  I think this will be more beneficial.    3.  MEDICATIONS:  - Patient should take methocarbamol 500 milligrams 3 times daily as needed.  Patient thought that he could not take this along with the gabapentin.  I  reassured the patient he can take these together.  - I also refilled the patient's gabapentin 300 mg 3 times daily.  - Patient has been using oxycodone.  Would not recommend additional opiates.    4.  INTERVENTIONS: I recommend right L3, L4, L5 medial branch blocks as work-up toward radiofrequency ablation.  Patient indicated he would like to proceed and an order was placed.    5.  REFERRALS:  - Patient saw podiatry August 3, 2023.  They diagnosed with planter fasciitis and performed cortisone injections in both heels.  They recommended follow-up if pain persists at which time they would perform a second cortisone injection.    6.  WORK: I provided a work note excusing him from work through September 11, 2023.  He may return to work September 12, 2023 with light duty work restrictions.  Please see letters tab for details.  - Patient's primary care provider has offered to fill out [place] paperwork.  I encouraged the patient to do this.    6.  FOLLOW-UP: Patient will return to the clinic for right L3, L4, L5 medial branch blocks.  If he has questions or concerns in the meantime, he should not hesitate to call.    Subjective:     Jeromy Valdivia is a 37 year old male who presents today for follow-up regarding a flare of chronic low back pain.  Pain flared up yesterday at work.  He was kneeling and upon standing up he felt severe pain right lower back.  Patient works as a .  Pain was so severe yesterday could barely walk.  He is using a walker today.    Patient complains of severe right low back pain.  Pain is looking the lower lumbar region.  Pain sometimes radiates over to the left side.  He denies any pain down the legs.  He continues to have some pain in the bilateral heels and posterior calves but states that when his back pain is more severe his legs do not bother him.  He did have injections for planter fasciitis last month which was somewhat helpful.  He feels a tingling sensation in his back but denies any  numbness or tingling down the legs.  Denies any weakness down the legs.  He rates his pain today as a 10 out of 10.  At its worst it is a 10 out of 10.  At its best it is a 5 out of 10.  Pain is aggravated moving.  Pain is alleviated with lying flat and not moving.  Denies loss of bowel or bladder control.  Denies fevers.    Treatment to date:  - 6 sessions physical therapy for lumbar radicular pain ending December 22, 2021.  He still does home exercises.  - Right L5-S1 and S1-S2 transforaminal epidural steroid injections March 17, 2023 provided 80% relief of pain for 2 months  - Bilateral L5-S1 transforaminal epidural steroid injections June 21, 2023 with 50% relief  - Gabapentin 300 mg as needed is somewhat helpful  - Cyclobenzaprine 10 mg   - Oxycodone   - Medrol Dosepak      Review of Systems:  Positive for tingling.  Negative for weakness, loss of bowel/bladder control, inability to urinate, headache, pain much worse at night, trip/stumble/falls, difficulty swallowing, difficulty with hand skills, fevers, unintentional weight loss.     Objective:   CONSTITUTIONAL:  Vital signs as above.  Patient appears to be in significant pain.  The patient is well nourished and well groomed.    PSYCHIATRIC:  The patient is awake, alert, oriented to person, place and time.  The patient is answering questions appropriately with clear speech.  Normal affect.  HEENT: Normocephalic, atraumatic.  Sclera clear.    SKIN:  Skin over the face, posterior torso, bilateral upper and lower extremities is clean, dry, intact without rashes.  VASCULAR: No significant lower extremity edema.  MUSCULOSKELETAL:  Gait is guarded.  He transitions from seated to standing and vice versa very carefully and slowly.  He walks with a shuffling gait.  Patient has a significant lumbar shift.  He has severe hypertonicity right lumbar paraspinous muscles.  No significant tenderness palpation lumbar paraspinous muscles.   The patient has 5/5 strength for the  bilateral hip flexors, knee flexors/extensors, ankle dorsiflexors/plantar flexors.  NEUROLOGICAL: 1+ bilateral patellar and Achilles reflexes.  No ankle clonus bilaterally.  Sensation light touch is intact bilateral lower extremities throughout.     RESULTS: I reviewed the MRI lumbar spine from [facility] dated August 19, 2023.  At L3-4 there is a right paracentral disc bulge with mild spinal canal stenosis.  At L4-5 there is a left paracentral disc bulge with mild spinal canal stenosis.  At L5-S1 there is a right paracentral disc bulge but no neural compromise.        Again, thank you for allowing me to participate in the care of your patient.        Sincerely,        Isabel Archer PA-C

## 2023-09-06 NOTE — LETTER
September 6, 2023      Jeromy Valdivia  [address]  [address]  [place] [address]        To Whom It May Concern:    Jeromy Valdivia was seen in our clinic. He should be excused from work 9/5/23 through 9/11/23.  He may return to work 9/12/23 with the following restrictions: no lifting more than 25 pound, no repetitive bending and twisting.      Sincerely,        Isabel Archer PA-C

## 2023-09-07 ENCOUNTER — TELEPHONE (OUTPATIENT)
Dept: FAMILY MEDICINE | Facility: CLINIC | Age: 38
End: 2023-09-07
Payer: COMMERCIAL

## 2023-09-07 NOTE — TELEPHONE ENCOUNTER
Forms Request  Name of form/paperwork: FMLA FORMS  Have you been seen for this request:   Do we have the form: ON NUBIA'S DESK  When is form needed by: WHEN DONE  How would you like the form returned:  CALL [phone]  Patient Notified form requests are processed in 3-5 business days: YES    Okay to leave a detailed message?

## 2023-09-10 NOTE — TELEPHONE ENCOUNTER
Happy to get this done.  Since this is the first time we are completing this, we need some additional information.  Please call.    I am guessing this is going to be for intermittent leave this for when the back flares up and that he has not been continuously out of work.  Please confirm.  Please give best estimate for how often the back pain is going to flareup monthly and how many days it is time he would be out when it does (1 day, 2 days, 3 days in a row)    Form is back in my basket    Juan José Roe, CNP

## 2023-09-14 NOTE — TELEPHONE ENCOUNTER
Patient read the MyChart encounter but did not respond.  Question if he understood what was being asked of him.  Please just call him and get the information and send it back to me.    Juan José Roe, CNP

## 2023-09-14 NOTE — TELEPHONE ENCOUNTER
Pt states this is for his back pain flare ups. He usually calls in 1 day (8 hour shift) a month, but states its a full week before his back pain is at a level he can tolerate enough to walk. Therefore he would like the whole week listed on the FMLA

## 2023-09-15 NOTE — TELEPHONE ENCOUNTER
Paperwork copy sent to be scanned.    Copies made for onsite clinic records? YES  (If Yes, place in provider gray hanging bin)    Copy needed for patient? YES  If Yes, patient will pt up copy at  Original available at the  for - pt notified

## 2023-09-22 ENCOUNTER — RADIOLOGY INJECTION OFFICE VISIT (OUTPATIENT)
Dept: PHYSICAL MEDICINE AND REHAB | Facility: CLINIC | Age: 38
End: 2023-09-22
Attending: PAIN MEDICINE
Payer: COMMERCIAL

## 2023-09-22 VITALS
SYSTOLIC BLOOD PRESSURE: 112 MMHG | RESPIRATION RATE: 16 BRPM | OXYGEN SATURATION: 96 % | TEMPERATURE: 97.8 F | DIASTOLIC BLOOD PRESSURE: 68 MMHG | HEART RATE: 95 BPM

## 2023-09-22 DIAGNOSIS — M47.816 LUMBAR FACET ARTHROPATHY: ICD-10-CM

## 2023-09-22 PROCEDURE — 64493 INJ PARAVERT F JNT L/S 1 LEV: CPT | Mod: 50 | Performed by: PAIN MEDICINE

## 2023-09-22 PROCEDURE — 64494 INJ PARAVERT F JNT L/S 2 LEV: CPT | Mod: 50 | Performed by: PAIN MEDICINE

## 2023-09-22 RX ORDER — BUPIVACAINE HYDROCHLORIDE 5 MG/ML
INJECTION, SOLUTION EPIDURAL; INTRACAUDAL
Status: COMPLETED | OUTPATIENT
Start: 2023-09-22 | End: 2023-09-22

## 2023-09-22 RX ORDER — LIDOCAINE HYDROCHLORIDE 10 MG/ML
INJECTION, SOLUTION EPIDURAL; INFILTRATION; INTRACAUDAL; PERINEURAL
Status: COMPLETED | OUTPATIENT
Start: 2023-09-22 | End: 2023-09-22

## 2023-09-22 RX ADMIN — LIDOCAINE HYDROCHLORIDE 5 ML: 10 INJECTION, SOLUTION EPIDURAL; INFILTRATION; INTRACAUDAL; PERINEURAL at 14:32

## 2023-09-22 RX ADMIN — BUPIVACAINE HYDROCHLORIDE 3 ML: 5 INJECTION, SOLUTION EPIDURAL; INTRACAUDAL at 14:32

## 2023-09-22 ASSESSMENT — PAIN SCALES - GENERAL
PAINLEVEL: MILD PAIN (2)
PAINLEVEL: SEVERE PAIN (6)

## 2023-09-22 NOTE — PATIENT INSTRUCTIONS
DISCHARGE INSTRUCTIONS    During office hours (8:00 a.m.- 4:00 p.m.) questions or concerns may be answered  by calling [facility] Center Navigation Nurses at  [phone].  Messages received after hours will be returned the following business day.      In the case of an emergency, please dial 911 or seek assistance at the nearest Emergency Room/Urgent Care facility.     All Patients:  You may experience an increase in your symptoms for the first 2 days, once the numbing medication wears off.    You may resume your regular medication, no pain medication until you have completed your diary    You may shower. No swimming, tub bath or hot tub for 24 hours; remove bandage after 4 hours    Continue your activities that can cause you pain to test the blocks.                         You should not drive for the next 3 to 5 hours (have someone drive you)           POSSIBLE PROCEDURE SIDE EFFECTS  -Call Spine Center if concerned-    Increased Pain  Increased numbness/tingling     Nausea/Vomiting  Bruising/bleeding at site (hematoma)             Swelling at site (edema) Headache  Difficulty walking  Infection        Fever greater than 100.5    Please complete your pain diary and return the diary to the Spine Center at your earliest convenience.  The Spine Center will contact you to schedule your next appointment after your pain diary is reviewed by your doctor.  Thank you.

## 2023-09-22 NOTE — LETTER
[facility] SPINE [facility] NEUROSURGERY  [address] [place] [id number]  [phone]  Dept: [phone]      September 22, 2023      Patient: Jeromy Valdivia   YOB: 1985   Date of Visit: 9/22/2023       To Whom It May Concern:    Jeromy Valdivia was seen for a procedure in our clinic on 9/22/2023. If you have any questions or concerns, please call.           Sincerely,           Anali Joyner RN  [phone]

## 2023-09-23 ENCOUNTER — MYC MEDICAL ADVICE (OUTPATIENT)
Dept: PHYSICAL MEDICINE AND REHAB | Facility: CLINIC | Age: 38
End: 2023-09-23
Payer: COMMERCIAL

## 2023-09-23 DIAGNOSIS — M47.816 SPONDYLOSIS OF LUMBAR REGION WITHOUT MYELOPATHY OR RADICULOPATHY: Primary | ICD-10-CM

## 2023-09-25 NOTE — TELEPHONE ENCOUNTER
Patient contacted to inform him that SUSHIL Denson was recommending that the patient move forward with confirmatory bilateral L3, L4, L5 medial branch blocks given the positive response he had to the initial blocks that were recently completed.  The patient was in agreement with the plan and wished to proceed.    Procedure requirements were reviewed with the patient to his verbalized understanding including the need for his pain rating to be at least 5/10 the day of his next injection appointment.    He verbalized understanding and was encouraged to contact the Spine Center if he had any questions or concerns prior to his next appointment.       A scheduling request message was sent to the Spine Center scheduling team.

## 2023-10-11 ENCOUNTER — RADIOLOGY INJECTION OFFICE VISIT (OUTPATIENT)
Dept: PHYSICAL MEDICINE AND REHAB | Facility: CLINIC | Age: 38
End: 2023-10-11
Attending: PHYSICIAN ASSISTANT
Payer: COMMERCIAL

## 2023-10-11 VITALS
DIASTOLIC BLOOD PRESSURE: 78 MMHG | HEART RATE: 96 BPM | RESPIRATION RATE: 18 BRPM | OXYGEN SATURATION: 98 % | SYSTOLIC BLOOD PRESSURE: 122 MMHG | TEMPERATURE: 97.5 F

## 2023-10-11 DIAGNOSIS — M47.816 SPONDYLOSIS OF LUMBAR REGION WITHOUT MYELOPATHY OR RADICULOPATHY: ICD-10-CM

## 2023-10-11 PROCEDURE — 64494 INJ PARAVERT F JNT L/S 2 LEV: CPT | Mod: 50 | Performed by: PAIN MEDICINE

## 2023-10-11 PROCEDURE — 64493 INJ PARAVERT F JNT L/S 1 LEV: CPT | Mod: 50 | Performed by: PAIN MEDICINE

## 2023-10-11 RX ORDER — LIDOCAINE HYDROCHLORIDE 10 MG/ML
INJECTION, SOLUTION EPIDURAL; INFILTRATION; INTRACAUDAL; PERINEURAL
Status: COMPLETED | OUTPATIENT
Start: 2023-10-11 | End: 2023-10-11

## 2023-10-11 RX ADMIN — LIDOCAINE HYDROCHLORIDE 5 ML: 10 INJECTION, SOLUTION EPIDURAL; INFILTRATION; INTRACAUDAL; PERINEURAL at 15:04

## 2023-10-11 ASSESSMENT — PAIN SCALES - GENERAL
PAINLEVEL: NO PAIN (1)
PAINLEVEL: MODERATE PAIN (5)

## 2023-10-11 NOTE — LETTER
[facility] SPINE AND NEUROSURGERY  [address] [address]  Phone: [phone]  Fax: [phone]    October 11, 2023        Jeromy Valdivia  [address]  [address]  [place] [address]          To whom it may concern:    RE: Jeromy Valdivia    Patient was seen and treated today at our clinic and missed work.    Please contact me for questions or concerns.              Sincerely,        Sanjeev Robins, DO

## 2023-10-11 NOTE — PATIENT INSTRUCTIONS
DISCHARGE INSTRUCTIONS    During office hours (8:00 a.m.- 4:00 p.m.) questions or concerns may be answered  by calling [facility] Navigation Nurses at  [phone].  Messages received after hours will be returned the following business day.      In the case of an emergency, please dial 911 or seek assistance at the nearest Emergency Room/Urgent Care facility.     All Patients:  You may experience an increase in your symptoms for the first 2 days, once the numbing medication wears off.    You may resume your regular medication, no pain medication until you have completed your diary    You may shower. No swimming, tub bath or hot tub for 24 hours; remove bandage after 4 hours    Continue your activities that can cause you pain to test the blocks.                         You should not drive for the next 3 to 5 hours (have someone drive you)           POSSIBLE PROCEDURE SIDE EFFECTS  -Call Spine Center if concerned-    Increased Pain  Increased numbness/tingling     Nausea/Vomiting  Bruising/bleeding at site (hematoma)             Swelling at site (edema) Headache  Difficulty walking  Infection        Fever greater than 100.5

## 2023-10-13 ENCOUNTER — TELEPHONE (OUTPATIENT)
Dept: PHYSICAL MEDICINE AND REHAB | Facility: CLINIC | Age: 38
End: 2023-10-13
Payer: COMMERCIAL

## 2023-10-13 DIAGNOSIS — M47.816 SPONDYLOSIS OF LUMBAR REGION WITHOUT MYELOPATHY OR RADICULOPATHY: Primary | ICD-10-CM

## 2023-10-13 NOTE — TELEPHONE ENCOUNTER
"Spoke with pt and he would like to move forward with the RFA. Order placed and message sent to PA team to seek approval prior to scheduling. Pt agreeable with this plan.     ----- Message from Chelsea Saeed RN sent at 10/13/2023 12:30 PM CDT -----  Regarding: FW: MBB/RFA Process  Per SUSHIL Denson:    \"Positive response.  Recommend RFA\".    ----- Message -----  From: Breyer, Nathan J, RN  Sent: 10/13/2023  10:59 AM CDT  To: Isabel Archer PA-C; #  Subject: FW: MBB/RFA Process                              Pain diary received and available for review.  Please forward recommendation to Procedure Support Pool in-basket.  Thanks!    ----- Message -----  From: Chelsea Saeed RN  Sent: 10/11/2023   2:53 PM CDT  To: [facility] Procedure Support Pool  Subject: FW: MBB/RFA Process                              Patient here today, 10/11/2023 for confirmatory bilateral L3, L4, L5 MBBs. Awaiting pain diary at this time.     ----- Message -----  From: Norberto Darlingia  Sent: 9/25/2023   4:12 PM CDT  To: Nathan J Breyer, RN; #  Subject: RE: MBB/RFA Process                              Patient is scheduled for 10/11/23 @ 2:40 pm -kh  ----- Message -----  From: Breyer, Nathan J, RN  Sent: 9/25/2023   3:36 PM CDT  To: [facility] Procedure Support Pool; #  Subject: FW: MBB/RFA Process                               Team-    Please contact patient to schedule him for confirmatory B/L L3, L4, L5 MBBs with Dr. Robins.  Once scheduled, please forward this message back to the Procedure Support Pool in-basket with the appt date.    Thanks!      Costa  ----- Message -----  From: Breyer, Nathan J, RN  Sent: 9/25/2023   8:57 AM CDT  To: [facility] Procedure Support Pool  Subject: FW: MBB/RFA Process                              Per Isabel on 9/25/23: \"Positive response.  Recommend confirmatory blocks.\"    ----- Message -----  From: Anali Joyner RN  Sent: 9/22/2023   2:34 PM CDT  To: [facility] " Procedure Support Pool  Subject: MBB/RFA Process                                  Pt was in today, 9-22 for bilateral L3, L4, L5 MBBs as ordered by Isabel. Awaiting return of pain diary at this time.

## 2023-10-21 ENCOUNTER — HEALTH MAINTENANCE LETTER (OUTPATIENT)
Age: 38
End: 2023-10-21

## 2023-11-03 ENCOUNTER — TELEPHONE (OUTPATIENT)
Dept: PHYSICAL MEDICINE AND REHAB | Facility: CLINIC | Age: 38
End: 2023-11-03
Payer: COMMERCIAL

## 2023-11-03 DIAGNOSIS — F41.9 ANXIETY DUE TO INVASIVE PROCEDURE: Primary | ICD-10-CM

## 2023-11-03 RX ORDER — DIAZEPAM 5 MG
TABLET ORAL
Qty: 2 TABLET | Refills: 0 | Status: SHIPPED | OUTPATIENT
Start: 2023-11-03 | End: 2023-11-24

## 2023-11-03 NOTE — TELEPHONE ENCOUNTER
Called patient and notified him that prescription for oral sedation has been sent to his pharmacy of choice. Reviewed with patient that he will need a  the day of his procedure and that he will need to call and notify us if he becomes ill prior to procedure. Discussed with pt that he cannot take this medication with any opiates due to risk of respiratory suppression/death as stated by his PSP. Pt verbalized understanding of these instruction and is agreeable with this plan.

## 2023-11-24 ENCOUNTER — RADIOLOGY INJECTION OFFICE VISIT (OUTPATIENT)
Dept: PHYSICAL MEDICINE AND REHAB | Facility: CLINIC | Age: 38
End: 2023-11-24
Attending: PHYSICIAN ASSISTANT
Payer: COMMERCIAL

## 2023-11-24 ENCOUNTER — TELEPHONE (OUTPATIENT)
Dept: PHYSICAL MEDICINE AND REHAB | Facility: CLINIC | Age: 38
End: 2023-11-24

## 2023-11-24 VITALS
TEMPERATURE: 97.7 F | OXYGEN SATURATION: 97 % | HEART RATE: 110 BPM | DIASTOLIC BLOOD PRESSURE: 84 MMHG | SYSTOLIC BLOOD PRESSURE: 124 MMHG | RESPIRATION RATE: 18 BRPM

## 2023-11-24 DIAGNOSIS — M47.816 SPONDYLOSIS OF LUMBAR REGION WITHOUT MYELOPATHY OR RADICULOPATHY: ICD-10-CM

## 2023-11-24 DIAGNOSIS — M79.18 MYOFASCIAL PAIN: Primary | ICD-10-CM

## 2023-11-24 PROCEDURE — 64636 DESTROY L/S FACET JNT ADDL: CPT | Mod: 50 | Performed by: PAIN MEDICINE

## 2023-11-24 PROCEDURE — 64635 DESTROY LUMB/SAC FACET JNT: CPT | Mod: 50 | Performed by: PAIN MEDICINE

## 2023-11-24 RX ORDER — LIDOCAINE HYDROCHLORIDE 10 MG/ML
INJECTION, SOLUTION EPIDURAL; INFILTRATION; INTRACAUDAL; PERINEURAL
Status: COMPLETED | OUTPATIENT
Start: 2023-11-24 | End: 2023-11-24

## 2023-11-24 RX ORDER — BUPIVACAINE HYDROCHLORIDE 2.5 MG/ML
INJECTION, SOLUTION EPIDURAL; INFILTRATION; INTRACAUDAL
Status: COMPLETED | OUTPATIENT
Start: 2023-11-24 | End: 2023-11-24

## 2023-11-24 RX ADMIN — BUPIVACAINE HYDROCHLORIDE 6 ML: 2.5 INJECTION, SOLUTION EPIDURAL; INFILTRATION; INTRACAUDAL at 14:56

## 2023-11-24 RX ADMIN — LIDOCAINE HYDROCHLORIDE 6 ML: 10 INJECTION, SOLUTION EPIDURAL; INFILTRATION; INTRACAUDAL; PERINEURAL at 14:55

## 2023-11-24 ASSESSMENT — PAIN SCALES - GENERAL
PAINLEVEL: SEVERE PAIN (6)
PAINLEVEL: NO PAIN (0)

## 2023-11-24 NOTE — TELEPHONE ENCOUNTER
Patient here today (11/24/2023) for bilateral L3, L4, L5 MBRFA. Pt tolerated procedure well and is planning to follow up in 6 weeks as directed.     Pt noted that since starting the methocarbamol he has had an increase in back cramping and spasms. He is wondering if there are any other medication options for him. Pain/cramping has not changed in location or sensation. He has tried flexeril in the past as well, however that made him very drowsy.       Routed to provider for review.       Patient would like to be contacted via [employer] with any changes or new recommendations.

## 2023-11-24 NOTE — PATIENT INSTRUCTIONS
DISCHARGE INSTRUCTIONS    During office hours (8:00 a.m.-4:00 p.m.) questions or concerns may be answered  by calling [facility] Navigation Nurses at  [phone].  Messages received after hours will be returned the following business day.      In the case of an emergency,please dial 911 or seek assistance at the nearest Emergency Room/Urgent Care facility.     All Patients:  You may experience an increase in your symptoms forthe first 5-7 days. Soreness may persist during the first few weeks as it takes 4-6 weeks for the nerves to fully heal.    You may use ice on the injection site,as frequently as 20 minutes each hour if needed. It is recommended NOT to apply heat during the first 24 hours.    You may take your pain medicine.    You may continue taking your regular medication.    You may shower. No swimming, tub bath or hot tub for 48hours. This also includes no lotions, ointments or patches to the needle sites as well. You may remove your bandaid/bandage as soon as you are home.    You mayresume light activities, as tolerated.    Resume your usual diet as tolerated.    It is strongly advisedthat you do not drive for 1-3 hours post injection.    If you have had oral sedation:  Do not drive for 8 hours post injection.             POSSIBLE PROCEDURE SIDE EFFECTS    -Call the Spine Center if you are concerned    IncreasedPain           Increased numbness/tingling      Nausea/Vomiting          Bruising/bleeding at site      Redness or swelling  Difficulty walking      Weakness          Fever greater than 100.5

## 2023-11-24 NOTE — LETTER
[facility]  [address]  [facility] [place] [address]  Phone: [phone]  Fax: [phone]    November 24, 2023        Jeromy Valdivia  [address]  [address]  [place] [address]          To whom it may concern:      RE: Jeromy Valdivia    Patient was seen and treated today at our clinic and missed work.  Patient may return to work tomorrow, 11/25/2023.        Please contact me for questions or concerns.              Sincerely,        Sanjeev Robins, DO

## 2023-11-27 RX ORDER — BACLOFEN 10 MG/1
TABLET ORAL
Qty: 90 TABLET | Refills: 0 | Status: SHIPPED | OUTPATIENT
Start: 2023-11-27 | End: 2024-05-16

## 2023-11-27 NOTE — TELEPHONE ENCOUNTER
Phone call to patient to offer Baclofen as a different muscle relaxer. He has not tried in the past, but would like to try it now. Pharmacy verified. Rx prepped for PSP review.

## 2023-12-15 ENCOUNTER — OFFICE VISIT (OUTPATIENT)
Dept: FAMILY MEDICINE | Facility: CLINIC | Age: 38
End: 2023-12-15
Payer: COMMERCIAL

## 2023-12-15 VITALS
BODY MASS INDEX: 36.67 KG/M2 | OXYGEN SATURATION: 95 % | WEIGHT: 199.3 LBS | HEIGHT: 62 IN | TEMPERATURE: 98.3 F | SYSTOLIC BLOOD PRESSURE: 130 MMHG | HEART RATE: 90 BPM | DIASTOLIC BLOOD PRESSURE: 72 MMHG | RESPIRATION RATE: 16 BRPM

## 2023-12-15 DIAGNOSIS — R25.2 MUSCLE CRAMP: ICD-10-CM

## 2023-12-15 DIAGNOSIS — Z00.00 ROUTINE GENERAL MEDICAL EXAMINATION AT A HEALTH CARE FACILITY: Primary | ICD-10-CM

## 2023-12-15 DIAGNOSIS — N50.812 LEFT TESTICULAR PAIN: ICD-10-CM

## 2023-12-15 DIAGNOSIS — Z13.1 SCREENING FOR DIABETES MELLITUS: ICD-10-CM

## 2023-12-15 DIAGNOSIS — Z11.4 SCREENING FOR HIV (HUMAN IMMUNODEFICIENCY VIRUS): ICD-10-CM

## 2023-12-15 DIAGNOSIS — M51.26 LUMBAR DISC HERNIATION: ICD-10-CM

## 2023-12-15 DIAGNOSIS — M10.9 ACUTE GOUT INVOLVING TOE OF LEFT FOOT, UNSPECIFIED CAUSE: ICD-10-CM

## 2023-12-15 DIAGNOSIS — Z13.220 LIPID SCREENING: ICD-10-CM

## 2023-12-15 DIAGNOSIS — Z11.59 NEED FOR HEPATITIS C SCREENING TEST: ICD-10-CM

## 2023-12-15 LAB
ALBUMIN UR-MCNC: NEGATIVE MG/DL
ANION GAP SERPL CALCULATED.3IONS-SCNC: 13 MMOL/L (ref 7–15)
APPEARANCE UR: CLEAR
BILIRUB UR QL STRIP: NEGATIVE
BUN SERPL-MCNC: 13.1 MG/DL (ref 6–20)
CALCIUM SERPL-MCNC: 9.4 MG/DL (ref 8.6–10)
CHLORIDE SERPL-SCNC: 105 MMOL/L (ref 98–107)
CHOLEST SERPL-MCNC: 219 MG/DL
COLOR UR AUTO: YELLOW
CREAT SERPL-MCNC: 0.84 MG/DL (ref 0.67–1.17)
DEPRECATED HCO3 PLAS-SCNC: 22 MMOL/L (ref 22–29)
EGFRCR SERPLBLD CKD-EPI 2021: >90 ML/MIN/1.73M2
ERYTHROCYTE [DISTWIDTH] IN BLOOD BY AUTOMATED COUNT: 12.4 % (ref 10–15)
FASTING STATUS PATIENT QL REPORTED: ABNORMAL
GLUCOSE SERPL-MCNC: 110 MG/DL (ref 70–99)
GLUCOSE UR STRIP-MCNC: NEGATIVE MG/DL
HBA1C MFR BLD: 5.6 % (ref 0–5.6)
HCT VFR BLD AUTO: 45.9 % (ref 40–53)
HDLC SERPL-MCNC: 38 MG/DL
HGB BLD-MCNC: 15.6 G/DL (ref 13.3–17.7)
HGB UR QL STRIP: ABNORMAL
KETONES UR STRIP-MCNC: NEGATIVE MG/DL
LDLC SERPL CALC-MCNC: 112 MG/DL
LEUKOCYTE ESTERASE UR QL STRIP: NEGATIVE
MAGNESIUM SERPL-MCNC: 2.5 MG/DL (ref 1.7–2.3)
MCH RBC QN AUTO: 30.5 PG (ref 26.5–33)
MCHC RBC AUTO-ENTMCNC: 34 G/DL (ref 31.5–36.5)
MCV RBC AUTO: 90 FL (ref 78–100)
NITRATE UR QL: NEGATIVE
NONHDLC SERPL-MCNC: 181 MG/DL
PH UR STRIP: 5.5 [PH] (ref 5–8)
PLATELET # BLD AUTO: 264 10E3/UL (ref 150–450)
POTASSIUM SERPL-SCNC: 3.9 MMOL/L (ref 3.4–5.3)
RBC # BLD AUTO: 5.12 10E6/UL (ref 4.4–5.9)
SODIUM SERPL-SCNC: 140 MMOL/L (ref 135–145)
SP GR UR STRIP: 1.02 (ref 1–1.03)
TRIGL SERPL-MCNC: 346 MG/DL
URATE SERPL-MCNC: 8 MG/DL (ref 3.4–7)
UROBILINOGEN UR STRIP-ACNC: 0.2 E.U./DL
WBC # BLD AUTO: 7.6 10E3/UL (ref 4–11)

## 2023-12-15 PROCEDURE — 80061 LIPID PANEL: CPT | Performed by: NURSE PRACTITIONER

## 2023-12-15 PROCEDURE — 99396 PREV VISIT EST AGE 40-64: CPT | Performed by: NURSE PRACTITIONER

## 2023-12-15 PROCEDURE — 85027 COMPLETE CBC AUTOMATED: CPT | Performed by: NURSE PRACTITIONER

## 2023-12-15 PROCEDURE — 87086 URINE CULTURE/COLONY COUNT: CPT | Performed by: NURSE PRACTITIONER

## 2023-12-15 PROCEDURE — 36415 COLL VENOUS BLD VENIPUNCTURE: CPT | Performed by: NURSE PRACTITIONER

## 2023-12-15 PROCEDURE — 80048 BASIC METABOLIC PNL TOTAL CA: CPT | Performed by: NURSE PRACTITIONER

## 2023-12-15 PROCEDURE — 86803 HEPATITIS C AB TEST: CPT | Performed by: NURSE PRACTITIONER

## 2023-12-15 PROCEDURE — 83036 HEMOGLOBIN GLYCOSYLATED A1C: CPT | Performed by: NURSE PRACTITIONER

## 2023-12-15 PROCEDURE — 81003 URINALYSIS AUTO W/O SCOPE: CPT | Performed by: NURSE PRACTITIONER

## 2023-12-15 PROCEDURE — 87389 HIV-1 AG W/HIV-1&-2 AB AG IA: CPT | Performed by: NURSE PRACTITIONER

## 2023-12-15 PROCEDURE — 83735 ASSAY OF MAGNESIUM: CPT | Performed by: NURSE PRACTITIONER

## 2023-12-15 PROCEDURE — 84550 ASSAY OF BLOOD/URIC ACID: CPT | Performed by: NURSE PRACTITIONER

## 2023-12-15 ASSESSMENT — PAIN SCALES - GENERAL: PAINLEVEL: NO PAIN (0)

## 2023-12-15 NOTE — PROGRESS NOTES
"SUBJECTIVE:   Jeromy is a 38 year old, presenting for the following:  Follow Up (Gout- sx flares in right knee since yesterday) and Diabetes (Frequent muscle cramps pt concerned it is related to DM)        12/15/2023     3:51 PM   Additional Questions   Roomed by Jazmin Moralez CMA       HPI    Still dealing with back issues.  Has been working with spine care.  Interested in second opinion      Social History     Tobacco Use    Smoking status: Every Day     Packs/day: .25     Types: Cigarettes     Start date: 1999    Smokeless tobacco: Former   Substance Use Topics    Alcohol use: Yes          No data to display              Last PSA: No results found for: \"PSA\"    Reviewed orders with patient. Reviewed health maintenance and updated orders accordingly - Yes  Lab work is in process    Reviewed and updated as needed this visit by clinical staff   Tobacco  Allergies  Meds    Surg Hx           Reviewed and updated as needed this visit by Provider        Surg Hx          No past medical history on file.   History reviewed. No pertinent surgical history.    Review of Systems  CONSTITUTIONAL: NEGATIVE for fever, chills, change in weight  INTEGUMENTARY/SKIN: NEGATIVE for worrisome rashes, moles or lesions  EYES: NEGATIVE for vision changes or irritation  ENT: NEGATIVE for ear, mouth and throat problems  RESP: NEGATIVE for significant cough or SOB  CV: NEGATIVE for chest pain, palpitations or peripheral edema  GI: NEGATIVE for nausea, abdominal pain, heartburn, or change in bowel habits   male: negative for dysuria, hematuria, decreased urinary stream, erectile dysfunction, urethral discharge  MUSCULOSKELETAL: NEGATIVE for significant arthralgias or myalgia  NEURO: NEGATIVE for weakness, dizziness or paresthesias  PSYCHIATRIC: NEGATIVE for changes in mood or affect    OBJECTIVE:   /72 (BP Location: Right arm, Patient Position: Sitting, Cuff Size: Adult Large)   Pulse 90   Temp 98.3  F (36.8  C) (Oral)   Resp 16  " " Ht 1.575 m (5' 2\")   Wt 90.4 kg (199 lb 4.8 oz)   SpO2 95%   BMI 36.45 kg/m      Physical Exam  GENERAL: healthy, alert and no distress  EYES: Eyes grossly normal to inspection, PERRL and conjunctivae and sclerae normal  HENT: ear canals and TM's normal, nose and mouth without ulcers or lesions  NECK: no adenopathy, no asymmetry, masses, or scars and thyroid normal to palpation  RESP: lungs clear to auscultation - no rales, rhonchi or wheezes  CV: regular rate and rhythm, normal S1 S2, no S3 or S4, no murmur, click or rub, no peripheral edema and peripheral pulses strong  ABDOMEN: soft, nontender, no hepatosplenomegaly, no masses and bowel sounds normal  MS: no gross musculoskeletal defects noted, no edema  SKIN: no suspicious lesions or rashes  NEURO: Normal strength and tone, mentation intact and speech normal  PSYCH: mentation appears normal, affect normal/bright    Diagnostic Test Results:  Labs reviewed in Epic    ASSESSMENT/PLAN:       ICD-10-CM    1. Routine general medical examination at a health care facility  Z00.00       2. Screening for HIV (human immunodeficiency virus)  Z11.4 HIV Antigen Antibody Combo     HIV Antigen Antibody Combo      3. Need for hepatitis C screening test  Z11.59 Hepatitis C Screen Reflex to HCV RNA Quant and Genotype     Hepatitis C Screen Reflex to HCV RNA Quant and Genotype      4. Acute gout involving toe of left foot, unspecified cause  M10.9 Uric acid     CBC with platelets     Uric acid     CBC with platelets      5. Lipid screening  Z13.220 Lipid panel reflex to direct LDL Non-fasting     Hemoglobin A1c     Lipid panel reflex to direct LDL Non-fasting     Hemoglobin A1c     CANCELED: Lipid panel      6. Screening for diabetes mellitus  Z13.1 Basic metabolic panel     Basic metabolic panel      7. Muscle cramp  R25.2 Magnesium     Magnesium      8. Left testicular pain  N50.812 US Testicular & Scrotum w Doppler Ltd     Urinalysis Macroscopic - lab collect     Urine " "Culture Aerobic Bacterial - lab collect     Urinalysis Macroscopic - lab collect     Urine Culture Aerobic Bacterial - lab collect      9. Lumbar disc herniation  M51.26 Orthopedic  Referral          Reviewed healthy lifestyle behaviors.  Encouraged no tobacco use.  We discussed different ways to achieve this.  He will let me know if interested in Chantix.  Referral placed to [facility] for second opinion on spine.  Given recurrent testicular pain, get ultrasound for further evaluation.  UA/UC to rule out infectious issues.  No specific STD risks/concerns.  Shots declined    Patient has been advised of split billing requirements and indicates understanding: No      COUNSELING:   Reviewed preventive health counseling, as reflected in patient instructions      BMI:   Estimated body mass index is 36.45 kg/m  as calculated from the following:    Height as of this encounter: 1.575 m (5' 2\").    Weight as of this encounter: 90.4 kg (199 lb 4.8 oz).   Weight management plan: Discussed healthy diet and exercise guidelines      He reports that he has been smoking cigarettes. He started smoking about 24 years ago. He has been smoking an average of .25 packs per day. He has quit using smokeless tobacco.  Nicotine/Tobacco Cessation Plan:   Information offered: Patient not interested at this time          Juan José Roe NP  [facility]      "

## 2023-12-15 NOTE — PATIENT INSTRUCTIONS
Updating annual blood work today.    I put the number for [facility] in your paperwork.  They have a couple of spine specialists who may be able to help you.    If interested in trying that Chantix medicine for smoking, let me know    I put in the number for scheduling the testicular ultrasound to get done at [facility] to evaluate the testicle pain you experienced.            Preventive Health Recommendations  Male Ages 40 to 49    Yearly exam:             See your health care provider every year in order to  o   Review health changes.   o   Discuss preventive care.    o   Review your medicines if your doctor has prescribed any.  You should be tested each year for STDs (sexually transmitted diseases) if you re at risk.   Have a cholesterol test every 5 years.   Have a colonoscopy (test for colon cancer) beginning at age 45.  Ask your provider about other options like a yearly FIT test or Cologuard test every 3 years (stool tests)   After age 45, have a diabetes test (fasting glucose). If you are at risk for diabetes, you should have this test every 3 years.    Talk with your health care provider about whether or not a prostate cancer screening test (PSA) is right for you.    Shots: Get a flu shot each year. Get a tetanus shot every 10 years.     Nutrition:  Eat at least 5 servings of fruits and vegetables daily.   Eat whole-grain bread, whole-wheat pasta and brown rice instead of white grains and rice.   Get adequate Calcium and Vitamin D.     Lifestyle  Exercise for at least 150 minutes a week (30 minutes a day, 5 days a week). This will help you control your weight and prevent disease.   Limit alcohol to one drink per day.   No smoking.   Wear sunscreen to prevent skin cancer.   See your dentist every six months for an exam and cleaning.

## 2023-12-16 LAB — HCV AB SERPL QL IA: NONREACTIVE

## 2023-12-17 LAB — BACTERIA UR CULT: NO GROWTH

## 2023-12-18 DIAGNOSIS — E79.0 HYPERURICEMIA: ICD-10-CM

## 2023-12-18 LAB — HIV 1+2 AB+HIV1 P24 AG SERPL QL IA: NONREACTIVE

## 2023-12-18 RX ORDER — ALLOPURINOL 300 MG/1
300 TABLET ORAL DAILY
Qty: 90 TABLET | Refills: 3 | Status: SHIPPED | OUTPATIENT
Start: 2023-12-18

## 2023-12-21 ENCOUNTER — HOSPITAL ENCOUNTER (OUTPATIENT)
Dept: ULTRASOUND IMAGING | Facility: HOSPITAL | Age: 38
Discharge: HOME | End: 2023-12-21
Attending: NURSE PRACTITIONER | Admitting: NURSE PRACTITIONER
Payer: COMMERCIAL

## 2023-12-21 DIAGNOSIS — N50.812 LEFT TESTICULAR PAIN: ICD-10-CM

## 2023-12-21 PROCEDURE — 76870 US EXAM SCROTUM: CPT

## 2024-01-09 ENCOUNTER — TELEPHONE (OUTPATIENT)
Dept: FAMILY MEDICINE | Facility: CLINIC | Age: 39
End: 2024-01-09
Payer: COMMERCIAL

## 2024-01-09 NOTE — TELEPHONE ENCOUNTER
Per provider request reached out to radiology on 12/27/23 and requested that the impression be reviewed for 12/21 testicular US as it appears there was a dictation error. Was FWD at that time back to the provider but no change to result. Will reach out again    Spoke with Amadeo provider is out until next week. She will send another email and he will most likely review at that time.

## 2024-03-18 ENCOUNTER — NURSE TRIAGE (OUTPATIENT)
Dept: FAMILY MEDICINE | Facility: CLINIC | Age: 39
End: 2024-03-18
Payer: COMMERCIAL

## 2024-03-18 NOTE — TELEPHONE ENCOUNTER
Spoke to patient with diarrhea and gave home care advice and when to call clinic if home care advice does not help.  Patient verbalized understanding.   ALPA Nunez RN  [facility]      Reason for Disposition   SEVERE diarrhea (e.g., 7 or more times / day more than normal)    Additional Information   Negative: Shock suspected (e.g., cold/pale/clammy skin, too weak to stand, low BP, rapid pulse)   Negative: Difficult to awaken or acting confused (e.g., disoriented, slurred speech)   Negative: Sounds like a life-threatening emergency to the triager   Negative: Vomiting also present and worse than the diarrhea   Negative: Blood in stool and without diarrhea   Negative: SEVERE abdominal pain (e.g., excruciating) and present > 1 hour   Negative: SEVERE abdominal pain and age > 60 years   Negative: Bloody, black, or tarry bowel movements  (Exception: Chronic-unchanged black-grey bowel movements and is taking iron pills or Pepto-Bismol.)   Negative: SEVERE diarrhea (e.g., 7 or more times / day more than normal) and age > 60 years   Negative: Constant abdominal pain lasting > 2 hours   Negative: Drinking very little and dehydration suspected (e.g., no urine > 12 hours, very dry mouth, very lightheaded)   Negative: Patient sounds very sick or weak to the triager   Negative: SEVERE diarrhea (e.g., 7 or more times / day more than normal) and present > 24 hours (1 day)   Negative: MODERATE diarrhea (e.g., 4-6 times / day more than normal) and present > 48 hours (2 days)   Negative: MODERATE diarrhea (e.g., 4-6 times / day more than normal) and age > 70 years   Negative: Abdominal pain (Exception: Pain clears completely with each passage of diarrhea stool.)   Negative: Fever > 101 F (38.3 C)   Negative: Blood in the stool  (Exception: Only on toilet paper. Reason: Diarrhea can cause rectal irritation with blood on wiping.)   Negative: Mucus or pus in stool has been present > 2 days and diarrhea is  "more than mild   Negative: Weak immune system (e.g., HIV positive, cancer chemo, splenectomy, organ transplant, chronic steroids)   Negative: Travel to a foreign country in past month   Negative: Recent antibiotic therapy (i.e., within last 2 months) and diarrhea present > 3 days since antibiotic was stopped   Negative: Recent hospitalization and diarrhea present > 3 days   Negative: Tube feedings (e.g., nasogastric, g-tube, j-tube)   Negative: MILD diarrhea (e.g., 1-3 or more stools than normal in past 24 hours) diarrhea without known cause and present > 7 days   Negative: Patient wants to be seen   Negative: Diarrhea is a chronic symptom (recurrent or ongoing AND lasting > 4 weeks)    Answer Assessment - Initial Assessment Questions  1. DIARRHEA SEVERITY: \"How bad is the diarrhea?\" \"How many more stools have you had in the past 24 hours than normal?\"     - NO DIARRHEA (SCALE 0)    - MILD (SCALE 1-3): Few loose or mushy BMs; increase of 1-3 stools over normal daily number of stools; mild increase in ostomy output.    -  MODERATE (SCALE 4-7): Increase of 4-6 stools daily over normal; moderate increase in ostomy output.    -  SEVERE (SCALE 8-10; OR \"WORST POSSIBLE\"): Increase of 7 or more stools daily over normal; moderate increase in ostomy output; incontinence.      7  2. ONSET: \"When did the diarrhea begin?\"       Yesterday  3. BM CONSISTENCY: \"How loose or watery is the diarrhea?\"       watery  4. VOMITING: \"Are you also vomiting?\" If Yes, ask: \"How many times in the past 24 hours?\"       Yesterday vomiting  5. ABDOMEN PAIN: \"Are you having any abdomen pain?\" If Yes, ask: \"What does it feel like?\" (e.g., crampy, dull, intermittent, constant)       No muscle soreness  6. ABDOMEN PAIN SEVERITY: If present, ask: \"How bad is the pain?\"  (e.g., Scale 1-10; mild, moderate, or severe)    - MILD (1-3): doesn't interfere with normal activities, abdomen soft and not tender to touch     - MODERATE (4-7): interferes with " "normal activities or awakens from sleep, abdomen tender to touch     - SEVERE (8-10): excruciating pain, doubled over, unable to do any normal activities        mild  7. ORAL INTAKE: If vomiting, \"Have you been able to drink liquids?\" \"How much liquids have you had in the past 24 hours?\"      yes  8. HYDRATION: \"Any signs of dehydration?\" (e.g., dry mouth [not just dry lips], too weak to stand, dizziness, new weight loss) \"When did you last urinate?\"      yes  9. EXPOSURE: \"Have you traveled to a foreign country recently?\" \"Have you been exposed to anyone with diarrhea?\" \"Could you have eaten any food that was spoiled?\"      no  10. ANTIBIOTIC USE: \"Are you taking antibiotics now or have you taken antibiotics in the past 2 months?\"        no  11. OTHER SYMPTOMS: \"Do you have any other symptoms?\" (e.g., fever, blood in stool)        no  12. PREGNANCY: \"Is there any chance you are pregnant?\" \"When was your last menstrual period?\"        no    Protocols used: Diarrhea-A-OH    "

## 2024-03-20 ENCOUNTER — VIRTUAL VISIT (OUTPATIENT)
Dept: INTERNAL MEDICINE | Facility: CLINIC | Age: 39
End: 2024-03-20
Payer: COMMERCIAL

## 2024-03-20 DIAGNOSIS — K52.9 GASTROENTERITIS: Primary | ICD-10-CM

## 2024-03-20 DIAGNOSIS — M10.9 URIC ACID ARTHROPATHY: ICD-10-CM

## 2024-03-20 PROCEDURE — 99213 OFFICE O/P EST LOW 20 MIN: CPT | Mod: 95 | Performed by: INTERNAL MEDICINE

## 2024-03-20 NOTE — PATIENT INSTRUCTIONS
Work note off March 18, 19th, and 20    MyChart communication initiated    Continue Imodium as needed    Contact me if worsening    Keep uric acid level below 5 to ensure that no back complaints are related to intermittent uric acid arthropathy    Future order for uric acid noted and advised to follow-up on 300 mg increased in December

## 2024-03-20 NOTE — PROGRESS NOTES
Jeromy is a 38 year old male contacting the clinic today via video, who will use the platform: [employer] for the visit.  Phone # for [employer], or if [facility] drops:   Telephone Information:   Mobile [phone]          ASSESSMENT and PLAN:  1. Gastroenteritis  Now resolving.  Suspect viral.  Contact me if any symptoms worsen    2. Uric acid arthropathy  Noted.  Discussed that uric acid could contribute to back pain.  Dose increased in December.  Recommend rechecking and keeping level below 5.0.      3.  Intermittent back pain.  Defer chronic back pain issues to primary regarding work adjustments     Patient Instructions   Work note off March 18, 19th, and 20    MyChart communication initiated    Continue Imodium as needed    Contact me if worsening    Keep uric acid level below 5 to ensure that no back complaints are related to intermittent uric acid arthropathy    Future order for uric acid noted and advised to follow-up on 300 mg increased in December            Return if symptoms worsen or fail to improve, for using a video visit.       CHIEF COMPLAINT:  Chief Complaint   Patient presents with    Food Poisoning        HISTORY OF PRESENT ILLNESS:  Jeromy is a 38 year old male contacting the clinic today via video for complaints of gastroenteritis.  He ate at a [employer] on March 16 at approximately 1 PM, and developed nausea and diarrhea at approximately 8 PM that night.  Vomited 1 time on March 17.  No hematemesis, and no blood in diarrhea.  Possible fever and chills which were brief.  Contacted nursing on March 18 and was advised Imodium which she took 4 or 5 times.  Missed work March 18, 19th and today.  When awoke today felt much better.  Made this appointment last night.  Requests work note.  Discussed etiology of viral versus bacterial    Has intermittent back pain.  MRI in September showed bulging disks.  Also has uric acid arthropathy and history of gout.  Uric acid level 8.0 in December and allopurinol  "dose increased from 100 mg to 300 mg.  Advised that uric acid could contribute to intermittent episodes of back pain to with crystalline uric acid precipitation and advised to keep uric acid level below 5.0 to completely exclude any contribution of uric acid.  Defer more long-term workability notes to either spine clinic or primary.  Currently unable to find letter    History of Present Illness       Reason for visit:  Follow up with food poisoning condition    He eats 0-1 servings of fruits and vegetables daily.He consumes 0 sweetened beverage(s) daily.He exercises with enough effort to increase his heart rate 9 or less minutes per day.  He exercises with enough effort to increase his heart rate 3 or less days per week.   He is not taking prescribed medications regularly due to remembering to take.    REVIEW OF SYSTEMS:   No jaundice or fever    Today's PHQ-2 Score:       3/19/2024    11:15 PM   PHQ-2 ( 1999 [employer])   Q1: Little interest or pleasure in doing things 0   Q2: Feeling down, depressed or hopeless 0   PHQ-2 Score 0   Q1: Little interest or pleasure in doing things Not at all   Q2: Feeling down, depressed or hopeless Not at all   PHQ-2 Score 0       PFSH:  Social History     Social History Narrative    Not on file       TOBACCO USE:  History   Smoking Status    Every Day    Packs/day: 0.25    Types: Cigarettes    Start date: 1999   Smokeless Tobacco    Former       VITALS:  There were no vitals filed for this visit.  There were no vitals taken for this visit. Estimated body mass index is 36.45 kg/m  as calculated from the following:    Height as of 12/15/23: 1.575 m (5' 2\").    Weight as of 12/15/23: 90.4 kg (199 lb 4.8 oz).    PHYSICAL EXAM:  (observations via Video)  Alert and oriented.  Shaved head.  Rodriguez    MEDICATIONS:   Current Outpatient Medications   Medication Sig Dispense Refill    allopurinol (ZYLOPRIM) 300 MG tablet Take 1 tablet (300 mg) by mouth daily 90 tablet 3    baclofen (LIORESAL) 10 " MG tablet Take 1 tablet three times a day as needed for pain/muscle spasms. 90 tablet 0    gabapentin (NEURONTIN) 300 MG capsule Take 1 capsule (300 mg) by mouth 3 times daily as needed 40 capsule 0    oxyCODONE (ROXICODONE) 5 MG tablet Take 1 tablet (5 mg) by mouth every 6 hours as needed for severe pain 20 tablet 0       Outside Notes summarized: Nurse triage note March 18  Labs, x-rays, cardiology, GI tests reviewed: Uric acid 8.0 in December with future order noted  Recent Labs   Lab Test 12/15/23  1639 08/20/22  2149   HGB 15.6 15.6   WBC 7.6 8.3    140   POTASSIUM 3.9 3.9   CR 0.84 1.02   A1C 5.6  --    URIC 8.0*  --      Lab Results   Component Value Date    NQPAF76NSB Negative 08/20/2022     Lab Results   Component Value Date    CHOL 219 12/15/2023     New orders: No orders of the defined types were placed in this encounter.      Independent review of:         3/20/2024     9:09 AM   Additional Questions   Roomed by RITIKA Matta   Accompanied by alone       Video-Visit Details  Type of service:  Video Visit  Patient has given verbal consent to a Video visit?  Yes  How would you like to obtain your AVS?  MyChart  Will anyone else be joining your video visit, giving supplemental history? No    Originating location (pt location): Home    Distant Location (provider location):  Off-site    Video Start Time: 9:39 AM  Video End time:  9:59 AM  Face to face plus orders: 20 minutes  Documentation time:  3 minutes     The visit lasted a total of 23 minutes    Sanjeev Fang MD  [facility]

## 2024-03-20 NOTE — PROGRESS NOTES
Jeromy is a 38 year old who is being evaluated via a billable video visit.    How would you like to obtain your AVS? MyChart  If the video visit is dropped, the invitation should be resent by: Send to e-mail at: [email or web address]  Will anyone else be joining your video visit? No  {If patient encounters technical issues they should call [phone] :604142}    {PROVIDER CHARTING PREFERENCE:477223}    Subjective   Jeromy is a 38 year old, presenting for the following health issues:  Food Poisoning       3/20/2024     9:09 AM   Additional Questions   Roomed by RITIKA Matta   Accompanied by alone     History of Present Illness       Reason for visit:  Follow up with food poisoning condition    He eats 0-1 servings of fruits and vegetables daily.He consumes 0 sweetened beverage(s) daily.He exercises with enough effort to increase his heart rate 9 or less minutes per day.  He exercises with enough effort to increase his heart rate 3 or less days per week.   He is not taking prescribed medications regularly due to remembering to take.       {SUPERLIST (Optional):860338}  {additonal problems for provider to add (Optional):416210}    {ROS Picklists (Optional):801986}      Objective           Vitals:  No vitals were obtained today due to virtual visit.    Physical Exam   {video visit exam brief selected:342115}    {Diagnostic Test Results (Optional):810744}      Video-Visit Details    Type of service:  Video Visit   Originating Location (pt. Location): Home  {PROVIDER LOCATION On-site should be selected for visits conducted from your clinic location or adjoining [facility] hospital, academic office, or other nearby [facility] building. Off-site should be selected for all other provider locations, including home:[address]}  Distant Location (provider location):  Off-site  Platform used for Video Visit: Eleni  Signed Electronically by: Sanjeev Fang MD  {Email feedback regarding this note to  [email or web address]   :[id number]}

## 2024-03-20 NOTE — LETTER
[facility]  [address] [place] [address]  [phone]  Dept: [phone]      3/20/2024    Re: Jeromy Valdivia      TO WHOM IT MAY CONCERN:    Jeromy Valdivia was seen on March 20, 2024.  Please excuse him until March 21, 2024 due to illness.  He was unable to work March 18-20.    Cordially          Sanjeev Fang MD  [facility]

## 2024-05-15 DIAGNOSIS — M79.18 MYOFASCIAL PAIN: ICD-10-CM

## 2024-05-15 NOTE — TELEPHONE ENCOUNTER
Last appointment: 09/06/2023  Next appointment: 05/15/2024    Notes/Comments: follow-up tomorrow      Rx request(s) has been reviewed.

## 2024-05-16 ENCOUNTER — OFFICE VISIT (OUTPATIENT)
Dept: PHYSICAL MEDICINE AND REHAB | Facility: CLINIC | Age: 39
End: 2024-05-16
Payer: COMMERCIAL

## 2024-05-16 VITALS
BODY MASS INDEX: 34.65 KG/M2 | HEIGHT: 62 IN | SYSTOLIC BLOOD PRESSURE: 122 MMHG | DIASTOLIC BLOOD PRESSURE: 75 MMHG | WEIGHT: 188.3 LBS | HEART RATE: 85 BPM

## 2024-05-16 DIAGNOSIS — M25.50 POLYARTHRALGIA: ICD-10-CM

## 2024-05-16 DIAGNOSIS — M47.816 SPONDYLOSIS OF LUMBAR REGION WITHOUT MYELOPATHY OR RADICULOPATHY: ICD-10-CM

## 2024-05-16 DIAGNOSIS — M62.830 BACK MUSCLE SPASM: Primary | ICD-10-CM

## 2024-05-16 DIAGNOSIS — M62.838 MUSCLE SPASTICITY: ICD-10-CM

## 2024-05-16 PROCEDURE — 99213 OFFICE O/P EST LOW 20 MIN: CPT | Performed by: PHYSICIAN ASSISTANT

## 2024-05-16 RX ORDER — BACLOFEN 10 MG/1
TABLET ORAL
Qty: 90 TABLET | Refills: 0 | Status: SHIPPED | OUTPATIENT
Start: 2024-05-16

## 2024-05-16 ASSESSMENT — PAIN SCALES - GENERAL: PAINLEVEL: MILD PAIN (2)

## 2024-05-16 NOTE — PROGRESS NOTES
Assessment:   Jeromy Valdivia is a 38 y.o. male who is otherwise healthy who presents today for follow-up regarding low back pain and spasms.  My review of an MRI lumbar spine from shows shallow disc bulges at L3-4, L4-5, and L5-S1.  There is mild spinal canal stenosis L3-4 and L4-5.  Patient had bilateral L3, L4, L5 radiofrequency ablation November 24, 2023 which provided 50% relief of pain for 5 months.  However, he had experienced significant burning pain postprocedure which sounds like neuritis.  Over the past month he has had return of his pain as well as spasms.  Spasms primarily affect his low back but also affect the bilateral flanks, chest wall, neck, and calves.  Question if there may be a more systemic reason for his spasms.  He states over the past 1 month he has also had worsening multiple joint pains in his knees, feet, and hands.  He is neurologically intact.    PSP: Dr. Robins  Plan:     A shared decision making plan was used.  The patient's values and choices were respected.  The following represents what was discussed and decided upon by the physician assistant and the patient.      1.  DIAGNOSTIC TESTS: I reviewed the MRI lumbar spine.  No additional diagnostic test were ordered.    2.  PHYSICAL THERAPY: Patient completed 6 sessions of physical therapy for lumbar radicular pain ending December 22, 2021.  -I offered a referral for additional physical therapy.  He declined.  He must continue doing exercises at home.    3.  MEDICATIONS:  -Baclofen was just refilled yesterday.  I told the patient he could pick that up at his pharmacy.  -I suggested the patient also use magnesium supplement to help with muscle cramps.      4.  INTERVENTIONS: No additional interventions were ordered.  Patient is reluctant to consider additional interventional pain management since he had significant postprocedure burning pain after his radiofrequency ablation.    5.  REFERRALS: Entered referrals to neurology and  rheumatology for further evaluation of his symptoms.    6.  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  He states that he needs to take a break to sit after every 3 hours because of his back pain.  I provided a work note indicating that she he should be able to sit and stretch for 5 to 10 minutes after every 3 hours of standing work.    7.  FOLLOW-UP: Patient is going to follow-up as needed.  Will await recommendations from other specialist.  If he has any questions or concerns in the meantime, he should not hesitate to call.    Subjective:     Jeromy Valdivia is a 38 year old male who presents today for follow-up regarding low back pain.  Patient was last at our clinic for bilateral L3, L4, L5 radiofrequency ablation November 24, 2023.  Patient reports the procedure provided 50% relief of pain for 5 months.  However, he states that he has significant burning pain on the skin after the procedure.  Last month pain returned.  He is having significant spasms associated with the back pain.  Spasms involve the low back but also extend into the flanks, chest wall, neck, and calves.  Over the past 1 month he has also had worsening pain involving multiple joints.  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  He is also having significant pain in both hands.  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  He states the spasms in his back are worse with prolonged standing which interferes with his ability to work.  He states that he has to stop and sit down to rest and stretch after approximately every 3 hours of standing.  The spasms tend to be worst when he is trying to fall asleep at night.  He rates his pain today as a 2 out of 10.  At its worst it is a 5-10.  At its best it is a 2 out of 10.  Pain is aggravated with standing too long, sitting too long, and lying down too long.  Pain is alleviated with stretching.        Treatment to date:  - 6 sessions physical therapy for lumbar radicular pain ending  December 22, 2021.  He still does home exercises.  - Bilateral L3, L4, L5 radiofrequency ablation November 24, 2023 with 50% relief of pain for 5 months  - Right L5-S1 and S1-S2 transforaminal epidural steroid injections March 17, 2023 provided 80% relief of pain for 2 months  - Bilateral L3, L4, L5 medial branch blocks October 11, 2023-positive response  - Bilateral L3, L4, L5 medial branch blocks September 27, 2023-positive spine  - Bilateral L5-S1 transforaminal epidural steroid injections June 21, 2023 with 50% relief  - Gabapentin 300 mg   - Cyclobenzaprine 10 mg   - Oxycodone   - Medrol Dosepak  - Methocarbamol  - Baclofen as needed helpful.  He feels this is the most effective muscle relaxer he has tried.      Review of Systems:  Positive for pain much worse at night.  Negative for weakness, loss of bowel/bladder control, inability to urinate, headache, numbness/tingling, trip/stumble/falls, difficulty swallowing, difficulty with hand skills, fevers, unintentional weight loss.     Objective:   CONSTITUTIONAL:  Vital signs as above.  Patient is in no acute distress.  The patient is well nourished and well groomed.    PSYCHIATRIC:  The patient is awake, alert, oriented to person, place and time.  The patient is answering questions appropriately with clear speech.  Normal affect.  HEENT: Normocephalic, atraumatic.  Sclera clear.    SKIN:  Skin over the face, posterior torso, bilateral upper and lower extremities is clean, dry, intact without rashes.  VASCULAR: No significant lower extremity edema.  MUSCULOSKELETAL: Patient ambulates with a narrow-base, nonantalgic gait.  Able to ambulate on toes and heels bilaterally without difficulty.  No significant tenderness palpation bilateral lower lumbar paraspinous muscles.  No significant hypertonicity bilateral lumbar paraspinous muscles.  The patient has 5/5 strength for the bilateral hip flexors, knee flexors/extensors, ankle dorsiflexors/plantar  flexors.  NEUROLOGICAL:  No ankle clonus bilaterally.  Sensation light touch is intact bilateral lower extremities throughout.     RESULTS: I reviewed the MRI lumbar spine from [facility] dated August 19, 2023.  At L3-4 there is a right paracentral disc bulge with mild spinal canal stenosis.  At L4-5 there is a left paracentral disc bulge with mild spinal canal stenosis.  At L5-S1 there is a right paracentral disc bulge but no neural compromise.

## 2024-05-16 NOTE — LETTER
5/16/2024         RE: Jeromy Valdivia  [address]  [place] [address]        Dear Colleague,    Thank you for referring your patient, Jeromy Valdivia, to the [facility]. Please see a copy of my visit note below.    Assessment:   Jeromy Valdivia is a 38 y.o. male who is otherwise healthy who presents today for follow-up regarding low back pain and spasms.  My review of an MRI lumbar spine from shows shallow disc bulges at L3-4, L4-5, and L5-S1.  There is mild spinal canal stenosis L3-4 and L4-5.  Patient had bilateral L3, L4, L5 radiofrequency ablation November 24, 2023 which provided 50% relief of pain for 5 months.  However, he had experienced significant burning pain postprocedure which sounds like neuritis.  Over the past month he has had return of his pain as well as spasms.  Spasms primarily affect his low back but also affect the bilateral flanks, chest wall, neck, and calves.  Question if there may be a more systemic reason for his spasms.  He states over the past 1 month he has also had worsening multiple joint pains in his knees, feet, and hands.  He is neurologically intact.    PSP: Dr. Robins  Plan:     A shared decision making plan was used.  The patient's values and choices were respected.  The following represents what was discussed and decided upon by the physician assistant and the patient.      1.  DIAGNOSTIC TESTS: I reviewed the MRI lumbar spine.  No additional diagnostic test were ordered.    2.  PHYSICAL THERAPY: Patient completed 6 sessions of physical therapy for lumbar radicular pain ending December 22, 2021.  -I offered a referral for additional physical therapy.  He declined.  He must continue doing exercises at home.    3.  MEDICATIONS:  -Baclofen was just refilled yesterday.  I told the patient he could pick that up at his pharmacy.  -I suggested the patient also use magnesium supplement to help with muscle cramps.      4.  INTERVENTIONS: No additional  interventions were ordered.  Patient is reluctant to consider additional interventional pain management since he had significant postprocedure burning pain after his radiofrequency ablation.    5.  REFERRALS: Entered referrals to neurology and rheumatology for further evaluation of his symptoms.    6.  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  He states that he needs to take a break to sit after every 3 hours because of his back pain.  I provided a work note indicating that she he should be able to sit and stretch for 5 to 10 minutes after every 3 hours of standing work.    7.  FOLLOW-UP: Patient is going to follow-up as needed.  Will await recommendations from other specialist.  If he has any questions or concerns in the meantime, he should not hesitate to call.    Subjective:     Jeromy Valdivia is a 38 year old male who presents today for follow-up regarding low back pain.  Patient was last at our clinic for bilateral L3, L4, L5 radiofrequency ablation November 24, 2023.  Patient reports the procedure provided 50% relief of pain for 5 months.  However, he states that he has significant burning pain on the skin after the procedure.  Last month pain returned.  He is having significant spasms associated with the back pain.  Spasms involve the low back but also extend into the flanks, chest wall, neck, and calves.  Over the past 1 month he has also had worsening pain involving multiple joints.  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  He is also having significant pain in both hands.  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  He states the spasms in his back are worse with prolonged standing which interferes with his ability to work.  He states that he has to stop and sit down to rest and stretch after approximately every 3 hours of standing.  The spasms tend to be worst when he is trying to fall asleep at night.  He rates his pain today as a 2 out of 10.  At its worst it is a 5-10.  At its  best it is a 2 out of 10.  Pain is aggravated with standing too long, sitting too long, and lying down too long.  Pain is alleviated with stretching.        Treatment to date:  - 6 sessions physical therapy for lumbar radicular pain ending December 22, 2021.  He still does home exercises.  - Bilateral L3, L4, L5 radiofrequency ablation November 24, 2023 with 50% relief of pain for 5 months  - Right L5-S1 and S1-S2 transforaminal epidural steroid injections March 17, 2023 provided 80% relief of pain for 2 months  - Bilateral L3, L4, L5 medial branch blocks October 11, 2023-positive response  - Bilateral L3, L4, L5 medial branch blocks September 27, 2023-positive spine  - Bilateral L5-S1 transforaminal epidural steroid injections June 21, 2023 with 50% relief  - Gabapentin 300 mg   - Cyclobenzaprine 10 mg   - Oxycodone   - Medrol Dosepak  - Methocarbamol  - Baclofen as needed helpful.  He feels this is the most effective muscle relaxer he has tried.      Review of Systems:  Positive for pain much worse at night.  Negative for weakness, loss of bowel/bladder control, inability to urinate, headache, numbness/tingling, trip/stumble/falls, difficulty swallowing, difficulty with hand skills, fevers, unintentional weight loss.     Objective:   CONSTITUTIONAL:  Vital signs as above.  Patient is in no acute distress.  The patient is well nourished and well groomed.    PSYCHIATRIC:  The patient is awake, alert, oriented to person, place and time.  The patient is answering questions appropriately with clear speech.  Normal affect.  HEENT: Normocephalic, atraumatic.  Sclera clear.    SKIN:  Skin over the face, posterior torso, bilateral upper and lower extremities is clean, dry, intact without rashes.  VASCULAR: No significant lower extremity edema.  MUSCULOSKELETAL: Patient ambulates with a narrow-base, nonantalgic gait.  Able to ambulate on toes and heels bilaterally without difficulty.  No significant tenderness palpation  bilateral lower lumbar paraspinous muscles.  No significant hypertonicity bilateral lumbar paraspinous muscles.  The patient has 5/5 strength for the bilateral hip flexors, knee flexors/extensors, ankle dorsiflexors/plantar flexors.  NEUROLOGICAL:  No ankle clonus bilaterally.  Sensation light touch is intact bilateral lower extremities throughout.     RESULTS: I reviewed the MRI lumbar spine from [facility] dated August 19, 2023.  At L3-4 there is a right paracentral disc bulge with mild spinal canal stenosis.  At L4-5 there is a left paracentral disc bulge with mild spinal canal stenosis.  At L5-S1 there is a right paracentral disc bulge but no neural compromise.        Again, thank you for allowing me to participate in the care of your patient.        Sincerely,        Isabel Archer PA-C

## 2024-05-16 NOTE — LETTER
May 16, 2024      Jeromy Valdivia  [address] [place] [address]        To Whom It May Concern:    Jeromy Valdivia was seen in our clinic. He may return to work with the following restrictions: must be able to take a 5-10 minute break every 3 hrs to rest/stretch the low back.    Sincerely,      Isabel Archer

## 2024-07-17 ENCOUNTER — TELEPHONE (OUTPATIENT)
Dept: FAMILY MEDICINE | Facility: CLINIC | Age: 39
End: 2024-07-17
Payer: COMMERCIAL

## 2024-07-17 NOTE — TELEPHONE ENCOUNTER
General Call      Reason for Call:  patients spouse called and would like a callback with information for patients on allergies to all medications and list.    Thank you.    What are your questions or concerns:  no    Date of last appointment with provider: na    Could we send this information to you in [employer] or would you prefer to receive a phone call?:   Patient would prefer a phone call   Okay to leave a detailed message?: No at Other phone number:  [phone] *

## 2024-07-17 NOTE — TELEPHONE ENCOUNTER
Spoke with patient's wife to talk about the medication the patient is allergic to. She verbalized understanding.    Zoey Veliz RN  [facility]

## 2024-07-29 ENCOUNTER — TRANSFERRED RECORDS (OUTPATIENT)
Dept: HEALTH INFORMATION MANAGEMENT | Facility: CLINIC | Age: 39
End: 2024-07-29
Payer: COMMERCIAL

## 2024-09-18 ENCOUNTER — TRANSFERRED RECORDS (OUTPATIENT)
Dept: HEALTH INFORMATION MANAGEMENT | Facility: CLINIC | Age: 39
End: 2024-09-18
Payer: COMMERCIAL

## 2024-12-04 ENCOUNTER — MYC REFILL (OUTPATIENT)
Dept: FAMILY MEDICINE | Facility: CLINIC | Age: 39
End: 2024-12-04
Payer: COMMERCIAL

## 2024-12-04 DIAGNOSIS — M54.50 ACUTE MIDLINE LOW BACK PAIN WITHOUT SCIATICA: ICD-10-CM

## 2024-12-05 RX ORDER — GABAPENTIN 300 MG/1
300 CAPSULE ORAL 3 TIMES DAILY PRN
Qty: 40 CAPSULE | Refills: 0 | Status: SHIPPED | OUTPATIENT
Start: 2024-12-05

## 2024-12-09 ENCOUNTER — OFFICE VISIT (OUTPATIENT)
Dept: PHYSICAL MEDICINE AND REHAB | Facility: CLINIC | Age: 39
End: 2024-12-09
Payer: COMMERCIAL

## 2024-12-09 VITALS — HEART RATE: 104 BPM | SYSTOLIC BLOOD PRESSURE: 123 MMHG | DIASTOLIC BLOOD PRESSURE: 82 MMHG

## 2024-12-09 DIAGNOSIS — M62.830 BACK MUSCLE SPASM: ICD-10-CM

## 2024-12-09 DIAGNOSIS — M47.816 SPONDYLOSIS OF LUMBAR REGION WITHOUT MYELOPATHY OR RADICULOPATHY: Primary | ICD-10-CM

## 2024-12-09 PROCEDURE — 99214 OFFICE O/P EST MOD 30 MIN: CPT | Performed by: PHYSICIAN ASSISTANT

## 2024-12-09 RX ORDER — METHYLPREDNISOLONE 4 MG/1
TABLET ORAL
Qty: 21 TABLET | Refills: 0 | Status: SHIPPED | OUTPATIENT
Start: 2024-12-09

## 2024-12-09 ASSESSMENT — PAIN SCALES - GENERAL: PAINLEVEL_OUTOF10: MODERATE PAIN (4)

## 2024-12-09 NOTE — PROGRESS NOTES
Assessment:   Jeromy Valdivia is a 38 y.o. male who is otherwise healthy who presents today for follow-up regarding an acute flare of chronic low back pain without radicular symptoms.  My review of an MRI lumbar spine from shows shallow disc bulges at L3-4, L4-5, and L5-S1.  There is mild spinal canal stenosis L3-4 and L4-5.  Pain is most consistent with a flare of spondylosis with secondary myofascial pain.  Patient is neurologically intact.  No red flags.    PSP: Dr. Robins  Plan:     A shared decision making plan was used.  The patient's values and choices were respected.  The following represents what was discussed and decided upon by the physician assistant and the patient.      1.  DIAGNOSTIC TESTS: I reviewed the MRI lumbar spine.  No additional diagnostic test were ordered.  If current flare fails to improve he may need another MRI.    2.  PHYSICAL THERAPY: Patient completed 6 sessions of physical therapy for lumbar radicular pain ending December 22, 2021.  Encouraged him to continue doing home exercises.  I specifically encouraged him to work on strengthening exercises to prevent future flareups.    3.  MEDICATIONS:  - I prescribed a Medrol Dosepak.  He should not take ibuprofen while he takes the Medrol Dosepak.  After he completes the Medrol Dosepak he can resume ibuprofen.  - Patient can continue baclofen.  - Patient can continue gabapentin.        4.  INTERVENTIONS: No additional interventions were ordered.     5.  PATIENT EDUCATION: Patient is in agreement the above plan.  All questions were answered.    6.  FOLLOW-UP: Patient is going to follow-up as needed.  If he has questions or concerns, he should not hesitate to call.    Subjective:     Jeromy Valdivia is a 38 year old male who presents today for follow-up regarding a flare of low back pain.  I last saw this patient May 16, 2024.  Patient reports that since then his back pain has been present but mild and manageable with activity modifications and  stretching.  5 days ago he bent forward and had abrupt onset of right greater than left low back pain.  Pain was severe for the first 48 hours.  After that pain improved somewhat but he continues to have significant pinching pain and back spasms with twisting and bending.    Patient complains of low back pain.  He feels pain centrally at L5-S1.  He also has pain in the right greater than left lower lumbar paraspinous muscles.  He denies any pain radiating in the buttocks or down the legs.  Prior cramps in his legs have improved significantly since he started using an electrolyte powder in his water.  He denies numbness, tingling, weakness down the legs.  Denies loss of bowel or bladder control.  Denies fevers.  He rates his pain today as a 4 out of 10.  At its worst it is a 5 out of 10.  At its best it is a 4 out of 10.  Pain is aggravated with standing too long, sitting too long, laying too long.  Pain is alleviated with stretching.          Treatment to date:  - 6 sessions physical therapy for lumbar radicular pain ending December 22, 2021.  He still does home exercises.  He also uses an inversion table.  - Bilateral L3, L4, L5 radiofrequency ablation November 24, 2023 with 50% relief of pain for 5 months  - Right L5-S1 and S1-S2 transforaminal epidural steroid injections March 17, 2023 provided 80% relief of pain for 2 months  - Bilateral L3, L4, L5 medial branch blocks October 11, 2023-positive response  - Bilateral L3, L4, L5 medial branch blocks September 27, 2023-positive spine  - Bilateral L5-S1 transforaminal epidural steroid injections June 21, 2023 with 50% relief  - Gabapentin 300 mg as needed helpful  - Cyclobenzaprine 10 mg   - Oxycodone   - Medrol Dosepak  - Methocarbamol  - Baclofen as needed helpful.  He feels this is the most effective muscle relaxer he has tried.      Review of Systems:  Positive for pain much worse at night.  Negative for weakness, loss of bowel/bladder control, inability to  urinate, headache, numbness/tingling, trip/stumble/falls, difficulty swallowing, difficulty with hand skills, fevers, unintentional weight loss.     Objective:   CONSTITUTIONAL:  Vital signs as above.  Patient is in no acute distress.  The patient is well nourished and well groomed.    PSYCHIATRIC:  The patient is awake, alert, oriented to person, place and time.  The patient is answering questions appropriately with clear speech.  Normal affect.  HEENT: Normocephalic, atraumatic.  Sclera clear.    SKIN:  Skin over the face, posterior torso, bilateral upper and lower extremities is clean, dry, intact without rashes.  VASCULAR: No significant lower extremity edema.  MUSCULOSKELETAL: Patient ambulates with a narrow-base, nonantalgic gait.  Able to ambulate on toes and heels bilaterally without difficulty.  No significant tenderness palpation bilateral lower lumbar paraspinous muscles.  Mild hypertonicity right lumbar paraspinous muscles.  Thoracolumbar range of motion is within normal limits.  The patient has 5/5 strength for the bilateral hip flexors, knee flexors/extensors, ankle dorsiflexors/plantar flexors.  NEUROLOGICAL: 2+ bilateral patellar and Achilles reflexes.  Negative Babinski's bilaterally.  No ankle clonus bilaterally.  Sensation light touch is intact bilateral lower extremities throughout.     RESULTS: I reviewed the MRI lumbar spine from [facility] dated August 19, 2023.  At L3-4 there is a right paracentral disc bulge with mild spinal canal stenosis.  At L4-5 there is a left paracentral disc bulge with mild spinal canal stenosis.  At L5-S1 there is a right paracentral disc bulge but no neural compromise.

## 2024-12-09 NOTE — LETTER
12/9/2024      Jeromy Valdivia  [address]  [place] [address]      Dear Colleague,    Thank you for referring your patient, Jeromy Valdivia, to the [facility]. Please see a copy of my visit note below.    Assessment:   Jeromy Valdivia is a 38 y.o. male who is otherwise healthy who presents today for follow-up regarding an acute flare of chronic low back pain without radicular symptoms.  My review of an MRI lumbar spine from shows shallow disc bulges at L3-4, L4-5, and L5-S1.  There is mild spinal canal stenosis L3-4 and L4-5.  Pain is most consistent with a flare of spondylosis with secondary myofascial pain.  Patient is neurologically intact.  No red flags.    PSP: Dr. Robins  Plan:     A shared decision making plan was used.  The patient's values and choices were respected.  The following represents what was discussed and decided upon by the physician assistant and the patient.      1.  DIAGNOSTIC TESTS: I reviewed the MRI lumbar spine.  No additional diagnostic test were ordered.  If current flare fails to improve he may need another MRI.    2.  PHYSICAL THERAPY: Patient completed 6 sessions of physical therapy for lumbar radicular pain ending December 22, 2021.  Encouraged him to continue doing home exercises.  I specifically encouraged him to work on strengthening exercises to prevent future flareups.    3.  MEDICATIONS:  - I prescribed a Medrol Dosepak.  He should not take ibuprofen while he takes the Medrol Dosepak.  After he completes the Medrol Dosepak he can resume ibuprofen.  - Patient can continue baclofen.  - Patient can continue gabapentin.        4.  INTERVENTIONS: No additional interventions were ordered.     5.  PATIENT EDUCATION: Patient is in agreement the above plan.  All questions were answered.    6.  FOLLOW-UP: Patient is going to follow-up as needed.  If he has questions or concerns, he should not hesitate to call.    Subjective:     Jeromy Valdivia is a 38 year old male who  presents today for follow-up regarding a flare of low back pain.  I last saw this patient May 16, 2024.  Patient reports that since then his back pain has been present but mild and manageable with activity modifications and stretching.  5 days ago he bent forward and had abrupt onset of right greater than left low back pain.  Pain was severe for the first 48 hours.  After that pain improved somewhat but he continues to have significant pinching pain and back spasms with twisting and bending.    Patient complains of low back pain.  He feels pain centrally at L5-S1.  He also has pain in the right greater than left lower lumbar paraspinous muscles.  He denies any pain radiating in the buttocks or down the legs.  Prior cramps in his legs have improved significantly since he started using an electrolyte powder in his water.  He denies numbness, tingling, weakness down the legs.  Denies loss of bowel or bladder control.  Denies fevers.  He rates his pain today as a 4 out of 10.  At its worst it is a 5 out of 10.  At its best it is a 4 out of 10.  Pain is aggravated with standing too long, sitting too long, laying too long.  Pain is alleviated with stretching.          Treatment to date:  - 6 sessions physical therapy for lumbar radicular pain ending December 22, 2021.  He still does home exercises.  He also uses an inversion table.  - Bilateral L3, L4, L5 radiofrequency ablation November 24, 2023 with 50% relief of pain for 5 months  - Right L5-S1 and S1-S2 transforaminal epidural steroid injections March 17, 2023 provided 80% relief of pain for 2 months  - Bilateral L3, L4, L5 medial branch blocks October 11, 2023-positive response  - Bilateral L3, L4, L5 medial branch blocks September 27, 2023-positive spine  - Bilateral L5-S1 transforaminal epidural steroid injections June 21, 2023 with 50% relief  - Gabapentin 300 mg as needed helpful  - Cyclobenzaprine 10 mg   - Oxycodone   - Medrol Dosepak  - Methocarbamol  -  Baclofen as needed helpful.  He feels this is the most effective muscle relaxer he has tried.      Review of Systems:  Positive for pain much worse at night.  Negative for weakness, loss of bowel/bladder control, inability to urinate, headache, numbness/tingling, trip/stumble/falls, difficulty swallowing, difficulty with hand skills, fevers, unintentional weight loss.     Objective:   CONSTITUTIONAL:  Vital signs as above.  Patient is in no acute distress.  The patient is well nourished and well groomed.    PSYCHIATRIC:  The patient is awake, alert, oriented to person, place and time.  The patient is answering questions appropriately with clear speech.  Normal affect.  HEENT: Normocephalic, atraumatic.  Sclera clear.    SKIN:  Skin over the face, posterior torso, bilateral upper and lower extremities is clean, dry, intact without rashes.  VASCULAR: No significant lower extremity edema.  MUSCULOSKELETAL: Patient ambulates with a narrow-base, nonantalgic gait.  Able to ambulate on toes and heels bilaterally without difficulty.  No significant tenderness palpation bilateral lower lumbar paraspinous muscles.  Mild hypertonicity right lumbar paraspinous muscles.  Thoracolumbar range of motion is within normal limits.  The patient has 5/5 strength for the bilateral hip flexors, knee flexors/extensors, ankle dorsiflexors/plantar flexors.  NEUROLOGICAL: 2+ bilateral patellar and Achilles reflexes.  Negative Babinski's bilaterally.  No ankle clonus bilaterally.  Sensation light touch is intact bilateral lower extremities throughout.     RESULTS: I reviewed the MRI lumbar spine from [facility] dated August 19, 2023.  At L3-4 there is a right paracentral disc bulge with mild spinal canal stenosis.  At L4-5 there is a left paracentral disc bulge with mild spinal canal stenosis.  At L5-S1 there is a right paracentral disc bulge but no neural compromise.        Again, thank you for allowing me to participate in the care of your  patient.        Sincerely,        Isabel Archer PA-C

## 2024-12-12 ENCOUNTER — PATIENT OUTREACH (OUTPATIENT)
Dept: CARE COORDINATION | Facility: CLINIC | Age: 39
End: 2024-12-12
Payer: COMMERCIAL

## 2024-12-26 ENCOUNTER — PATIENT OUTREACH (OUTPATIENT)
Dept: CARE COORDINATION | Facility: CLINIC | Age: 39
End: 2024-12-26
Payer: COMMERCIAL

## 2025-01-19 ENCOUNTER — HEALTH MAINTENANCE LETTER (OUTPATIENT)
Age: 40
End: 2025-01-19

## 2025-02-14 ENCOUNTER — OFFICE VISIT (OUTPATIENT)
Dept: FAMILY MEDICINE | Facility: CLINIC | Age: 40
End: 2025-02-14
Payer: COMMERCIAL

## 2025-02-14 VITALS
HEART RATE: 103 BPM | DIASTOLIC BLOOD PRESSURE: 70 MMHG | WEIGHT: 191.6 LBS | BODY MASS INDEX: 32.71 KG/M2 | TEMPERATURE: 98.5 F | RESPIRATION RATE: 18 BRPM | OXYGEN SATURATION: 95 % | HEIGHT: 64 IN | SYSTOLIC BLOOD PRESSURE: 120 MMHG

## 2025-02-14 DIAGNOSIS — Z13.220 LIPID SCREENING: ICD-10-CM

## 2025-02-14 DIAGNOSIS — E79.0 HYPERURICEMIA: ICD-10-CM

## 2025-02-14 DIAGNOSIS — H93.13 TINNITUS, BILATERAL: ICD-10-CM

## 2025-02-14 DIAGNOSIS — M54.50 ACUTE MIDLINE LOW BACK PAIN WITHOUT SCIATICA: ICD-10-CM

## 2025-02-14 DIAGNOSIS — Z00.00 ROUTINE GENERAL MEDICAL EXAMINATION AT A HEALTH CARE FACILITY: Primary | ICD-10-CM

## 2025-02-14 DIAGNOSIS — Z13.1 SCREENING FOR DIABETES MELLITUS: ICD-10-CM

## 2025-02-14 DIAGNOSIS — M10.072 ACUTE IDIOPATHIC GOUT INVOLVING TOE OF LEFT FOOT: ICD-10-CM

## 2025-02-14 LAB
ERYTHROCYTE [DISTWIDTH] IN BLOOD BY AUTOMATED COUNT: 12.3 % (ref 10–15)
HCT VFR BLD AUTO: 46 % (ref 40–53)
HGB BLD-MCNC: 15.6 G/DL (ref 13.3–17.7)
MCH RBC QN AUTO: 30.4 PG (ref 26.5–33)
MCHC RBC AUTO-ENTMCNC: 33.9 G/DL (ref 31.5–36.5)
MCV RBC AUTO: 90 FL (ref 78–100)
PLATELET # BLD AUTO: 285 10E3/UL (ref 150–450)
RBC # BLD AUTO: 5.14 10E6/UL (ref 4.4–5.9)
WBC # BLD AUTO: 8.4 10E3/UL (ref 4–11)

## 2025-02-14 PROCEDURE — 36415 COLL VENOUS BLD VENIPUNCTURE: CPT | Performed by: NURSE PRACTITIONER

## 2025-02-14 PROCEDURE — 99213 OFFICE O/P EST LOW 20 MIN: CPT | Mod: 25 | Performed by: NURSE PRACTITIONER

## 2025-02-14 PROCEDURE — 85027 COMPLETE CBC AUTOMATED: CPT | Performed by: NURSE PRACTITIONER

## 2025-02-14 PROCEDURE — 99396 PREV VISIT EST AGE 40-64: CPT | Performed by: NURSE PRACTITIONER

## 2025-02-14 PROCEDURE — 84550 ASSAY OF BLOOD/URIC ACID: CPT | Performed by: NURSE PRACTITIONER

## 2025-02-14 PROCEDURE — 80061 LIPID PANEL: CPT | Performed by: NURSE PRACTITIONER

## 2025-02-14 PROCEDURE — 80053 COMPREHEN METABOLIC PANEL: CPT | Performed by: NURSE PRACTITIONER

## 2025-02-14 RX ORDER — ALLOPURINOL 300 MG/1
1 TABLET ORAL DAILY
Qty: 90 TABLET | Refills: 3 | Status: SHIPPED | OUTPATIENT
Start: 2025-02-14

## 2025-02-14 RX ORDER — GABAPENTIN 300 MG/1
300 CAPSULE ORAL 3 TIMES DAILY PRN
Qty: 90 CAPSULE | Refills: 3 | Status: SHIPPED | OUTPATIENT
Start: 2025-02-14

## 2025-02-14 RX ORDER — POTASSIUM CHLORIDE 1500 MG/1
20 TABLET, EXTENDED RELEASE ORAL
COMMUNITY

## 2025-02-14 ASSESSMENT — PAIN SCALES - GENERAL: PAINLEVEL_OUTOF10: NO PAIN (0)

## 2025-02-14 NOTE — PATIENT INSTRUCTIONS
Updating annual lab work today.    Refills sent to the pharmacy.    Keep in contact with the spine clinic if you still are having back pain issues.    I highlighted the scheduling number for audiology and ENT for further evaluation of the hearing loss.      Patient Education   Preventive Care Advice   This is general advice given by our system to help you stay healthy. However, your care team may have specific advice just for you. Please talk to your care team about your preventive care needs.  Nutrition  Eat 5 or more servings of fruits and vegetables each day.  Try wheat bread, brown rice and whole grain pasta (instead of white bread, rice, and pasta).  Get enough calcium and vitamin D. Check the label on foods and aim for 100% of the RDA (recommended daily allowance).  Lifestyle  Exercise at least 150 minutes each week  (30 minutes a day, 5 days a week).  Do muscle strengthening activities 2 days a week. These help control your weight and prevent disease.  No smoking.  Wear sunscreen to prevent skin cancer.  Have a dental exam and cleaning every 6 months.  Yearly exams  See your health care team every year to talk about:  Any changes in your health.  Any medicines your care team has prescribed.  Preventive care, family planning, and ways to prevent chronic diseases.  Shots (vaccines)   HPV shots (up to age 26), if you've never had them before.  Hepatitis B shots (up to age 59), if you've never had them before.  COVID-19 shot: Get this shot when it's due.  Flu shot: Get a flu shot every year.  Tetanus shot: Get a tetanus shot every 10 years.  Pneumococcal, hepatitis A, and RSV shots: Ask your care team if you need these based on your risk.  Shingles shot (for age 50 and up)  General health tests  Diabetes screening:  Starting at age 35, Get screened for diabetes at least every 3 years.  If you are younger than age 35, ask your care team if you should be screened for diabetes.  Cholesterol test: At age 39, start  having a cholesterol test every 5 years, or more often if advised.  Bone density scan (DEXA): At age 50, ask your care team if you should have this scan for osteoporosis (brittle bones).  Hepatitis C: Get tested at least once in your life.  STIs (sexually transmitted infections)  Before age 24: Ask your care team if you should be screened for STIs.  After age 24: Get screened for STIs if you're at risk. You are at risk for STIs (including HIV) if:  You are sexually active with more than one person.  You don't use condoms every time.  You or a partner was diagnosed with a sexually transmitted infection.  If you are at risk for HIV, ask about PrEP medicine to prevent HIV.  Get tested for HIV at least once in your life, whether you are at risk for HIV or not.  Cancer screening tests  Cervical cancer screening: If you have a cervix, begin getting regular cervical cancer screening tests starting at age 21.  Breast cancer scan (mammogram): If you've ever had breasts, begin having regular mammograms starting at age 40. This is a scan to check for breast cancer.  Colon cancer screening: It is important to start screening for colon cancer at age 45.  Have a colonoscopy test every 10 years (or more often if you're at risk) Or, ask your provider about stool tests like a FIT test every year or Cologuard test every 3 years.  To learn more about your testing options, visit:   .  For help making a decision, visit:   https://bit.ly/ps83226.  Prostate cancer screening test: If you have a prostate, ask your care team if a prostate cancer screening test (PSA) at age 55 is right for you.  Lung cancer screening: If you are a current or former smoker ages 50 to 80, ask your care team if ongoing lung cancer screenings are right for you.  For informational purposes only. Not to replace the advice of your health care provider. Copyright   2023 [employer]. All rights reserved. Clinically reviewed by the [facility]  Transitions Program. Tactile [id number] - REV 01/24.

## 2025-02-14 NOTE — PROGRESS NOTES
"Preventive Care Visit  [facility]  Juan José Roe NP, Family Medicine  Feb 14, 2025      Assessment & Plan       ICD-10-CM    1. Routine general medical examination at a health care facility  Z00.00       2. Acute midline low back pain without sciatica  M54.50 gabapentin (NEURONTIN) 300 MG capsule      3. Hyperuricemia  E79.0 allopurinol (ZYLOPRIM) 300 MG tablet      4. Acute idiopathic gout involving toe of left foot  M10.072 Uric acid     CBC with platelets     Uric acid     CBC with platelets      5. Screening for diabetes mellitus  Z13.1 Comprehensive metabolic panel (BMP + Alb, Alk Phos, ALT, AST, Total. Bili, TP)     Comprehensive metabolic panel (BMP + Alb, Alk Phos, ALT, AST, Total. Bili, TP)      6. Lipid screening  Z13.220 Lipid panel     Lipid panel      7. Tinnitus, bilateral  H93.13 Adult ENT  Referral     Adult Audiology  Referral        Reviewed healthy lifestyle behaviors.  Update annual lab work.  Keep working with spine care.  Referral for audiology/ENT for chronic tinnitus/hearing loss.  Discussed importance of tobacco cessation.          Nicotine/Tobacco Cessation  He reports that he has been smoking cigarettes. He started smoking about 26 years ago. He has a 6.5 pack-year smoking history. He has quit using smokeless tobacco.  Nicotine/Tobacco Cessation Plan  Information offered: Patient not interested at this time      BMI  Estimated body mass index is 32.89 kg/m  as calculated from the following:    Height as of this encounter: 1.626 m (5' 4\").    Weight as of this encounter: 86.9 kg (191 lb 9.6 oz).   Weight management plan: Discussed healthy diet and exercise guidelines          Ursula Pinzon is a 39 year old, presenting for the following:  Recheck Medication (Non fasting- regular follow up)        2/14/2025     2:58 PM   Additional Questions   Roomed by Jazmin Moralez CMA          HPI    Doing okay.  Continues working with spine care for " management of back pain issues.  No recent gout flares.  Has been dealing with tinnitus for the past 2.5 decades.      Health Care Directive  Patient does not have a Health Care Directive: Previously reviewed       No data to display                   No data to display                   No data to display                  12/15/2023   Social Factors   Worry food won't last until get money to buy more No   Food not last or not have enough money for food? No   Do you have housing? (Housing is defined as stable permanent housing and does not include staying ouside in a car, in a tent, in an abandoned building, in an overnight shelter, or couch-surfing.) Yes   Are you worried about losing your housing? No   Lack of transportation? No   Unable to get utilities (heat,electricity)? No          No data to display                     Today's PHQ-2 Score:       2/14/2025     2:57 PM   PHQ-2 ( 1999 [employer])   Q1: Little interest or pleasure in doing things 1   Q2: Feeling down, depressed or hopeless 0   PHQ-2 Score 1    Q1: Little interest or pleasure in doing things Several days   Q2: Feeling down, depressed or hopeless Not at all   PHQ-2 Score 1       Patient-reported           2/14/2025   Substance Use   If I could quit smoking, I would Completely disagree   I want to quit somking, worry about health affects Completely disagree   Willing to make a plan to quit smoking Completely disagree   Willing to cut down before quitting Completely disagree     Social History     Tobacco Use    Smoking status: Every Day     Current packs/day: 0.25     Average packs/day: 0.3 packs/day for 26.1 years (6.5 ttl pk-yrs)     Types: Cigarettes     Start date: 1999    Smokeless tobacco: Former   Substance Use Topics    Alcohol use: Yes    Drug use: Never       ASCVD Risk   The 10-year ASCVD risk score (Raj QUIROGA, et al., 2019) is: 6.2%    Values used to calculate the score:      Age: 39 years      Sex: Male      Is Non-   "American: No      Diabetic: No      Tobacco smoker: Yes      Systolic Blood Pressure: 120 mmHg      Is BP treated: No      HDL Cholesterol: 38 mg/dL      Total Cholesterol: 219 mg/dL         No data to display                 Reviewed and updated as needed this visit by Provider                    No past medical history on file.  History reviewed. No pertinent surgical history.      Review of Systems  Constitutional, HEENT, cardiovascular, pulmonary, gi and gu systems are negative, except as otherwise noted.     Objective    Exam  /70 (BP Location: Left arm, Patient Position: Sitting, Cuff Size: Adult Large)   Pulse 103   Temp 98.5  F (36.9  C) (Oral)   Resp 18   Ht 1.626 m (5' 4\")   Wt 86.9 kg (191 lb 9.6 oz)   SpO2 95%   BMI 32.89 kg/m     Estimated body mass index is 32.89 kg/m  as calculated from the following:    Height as of this encounter: 1.626 m (5' 4\").    Weight as of this encounter: 86.9 kg (191 lb 9.6 oz).    Physical Exam  GENERAL: alert and no distress  EYES: Eyes grossly normal to inspection, PERRL and conjunctivae and sclerae normal  HENT: ear canals and TM's normal, nose and mouth without ulcers or lesions  NECK: no adenopathy, no asymmetry, masses, or scars  RESP: lungs clear to auscultation - no rales, rhonchi or wheezes  CV: regular rate and rhythm, normal S1 S2, no S3 or S4, no murmur, click or rub, no peripheral edema  ABDOMEN: soft, nontender, no hepatosplenomegaly, no masses and bowel sounds normal  MS: no gross musculoskeletal defects noted, no edema  SKIN: no suspicious lesions or rashes  NEURO: Normal strength and tone, mentation intact and speech normal  PSYCH: mentation appears normal, affect normal/bright        Signed Electronically by: Juan José Roe NP    "

## 2025-02-15 LAB
ALBUMIN SERPL BCG-MCNC: 4.6 G/DL (ref 3.5–5.2)
ALP SERPL-CCNC: 74 U/L (ref 40–150)
ALT SERPL W P-5'-P-CCNC: 36 U/L (ref 0–70)
ANION GAP SERPL CALCULATED.3IONS-SCNC: 11 MMOL/L (ref 7–15)
AST SERPL W P-5'-P-CCNC: 28 U/L (ref 0–45)
BILIRUB SERPL-MCNC: 0.3 MG/DL
BUN SERPL-MCNC: 13.4 MG/DL (ref 6–20)
CALCIUM SERPL-MCNC: 9.2 MG/DL (ref 8.8–10.4)
CHLORIDE SERPL-SCNC: 105 MMOL/L (ref 98–107)
CHOLEST SERPL-MCNC: 226 MG/DL
CREAT SERPL-MCNC: 0.83 MG/DL (ref 0.67–1.17)
EGFRCR SERPLBLD CKD-EPI 2021: >90 ML/MIN/1.73M2
FASTING STATUS PATIENT QL REPORTED: ABNORMAL
FASTING STATUS PATIENT QL REPORTED: ABNORMAL
GLUCOSE SERPL-MCNC: 105 MG/DL (ref 70–99)
HCO3 SERPL-SCNC: 26 MMOL/L (ref 22–29)
HDLC SERPL-MCNC: 41 MG/DL
LDLC SERPL CALC-MCNC: 120 MG/DL
NONHDLC SERPL-MCNC: 185 MG/DL
POTASSIUM SERPL-SCNC: 3.9 MMOL/L (ref 3.4–5.3)
PROT SERPL-MCNC: 7.2 G/DL (ref 6.4–8.3)
SODIUM SERPL-SCNC: 142 MMOL/L (ref 135–145)
TRIGL SERPL-MCNC: 325 MG/DL
URATE SERPL-MCNC: 5.7 MG/DL (ref 3.4–7)

## 2025-07-28 ENCOUNTER — OFFICE VISIT (OUTPATIENT)
Dept: PHYSICAL MEDICINE AND REHAB | Facility: CLINIC | Age: 40
End: 2025-07-28
Payer: COMMERCIAL

## 2025-07-28 VITALS
DIASTOLIC BLOOD PRESSURE: 87 MMHG | SYSTOLIC BLOOD PRESSURE: 124 MMHG | BODY MASS INDEX: 32.97 KG/M2 | WEIGHT: 192.1 LBS | HEART RATE: 108 BPM

## 2025-07-28 DIAGNOSIS — M54.6 PAIN IN THORACIC SPINE: Primary | ICD-10-CM

## 2025-07-28 DIAGNOSIS — M54.50 LUMBAR SPINE PAIN: ICD-10-CM

## 2025-07-28 DIAGNOSIS — M79.18 MYOFASCIAL PAIN: ICD-10-CM

## 2025-07-28 PROCEDURE — 99214 OFFICE O/P EST MOD 30 MIN: CPT | Performed by: PHYSICIAN ASSISTANT

## 2025-07-28 PROCEDURE — 3079F DIAST BP 80-89 MM HG: CPT | Performed by: PHYSICIAN ASSISTANT

## 2025-07-28 PROCEDURE — 3074F SYST BP LT 130 MM HG: CPT | Performed by: PHYSICIAN ASSISTANT

## 2025-07-28 PROCEDURE — 1125F AMNT PAIN NOTED PAIN PRSNT: CPT | Performed by: PHYSICIAN ASSISTANT

## 2025-07-28 RX ORDER — BACLOFEN 10 MG/1
TABLET ORAL
Qty: 90 TABLET | Refills: 1 | Status: SHIPPED | OUTPATIENT
Start: 2025-07-28

## 2025-07-28 ASSESSMENT — PAIN SCALES - GENERAL: PAINLEVEL_OUTOF10: MODERATE PAIN (6)

## 2025-07-28 NOTE — LETTER
July 28, 2025      Jeromy Valdivia  [address] [place] [address]        To Whom It May Concern:    Jeromy Valdivia was seen in our clinic. He may return to work with the following: must be able to take a 5-10 minute break every 3 hrs to rest/stretch the low back.       Sincerely,      Isabel Archer        Electronically signed

## 2025-07-28 NOTE — PROGRESS NOTES
Assessment:   Jeromy Valdivia is a 39 y.o. male who is otherwise healthy who presents today for follow-up regarding an acute flare of chronic back pain currently affecting the thoracic and lumbar regions with radiation into the buttocks.  Pain flared up about 3 weeks ago with no trauma.  My review of an MRI lumbar spine from August 2023 shows shallow disc bulges at L3-4, L4-5, and L5-S1.  There is mild spinal canal stenosis L3-4 and L4-5.  I am concerned spondylosis has progressed his pain is worsening.  Patient is neurologically intact on exam.    Plan:     A shared decision making plan was used.  The patient's values and choices were respected.  The following represents what was discussed and decided upon by the physician assistant and the patient.      1.  DIAGNOSTIC TESTS:  - I reviewed the MRI lumbar spine from August 2023.  - I ordered an updated MRI lumbar spine as well as an MRI thoracic spine for further evaluation of his worsening pain.    2.  PHYSICAL THERAPY: Patient completed 6 sessions of physical therapy for lumbar radicular pain ending December 22, 2021.  - Entered a new referral for the patient to be in [facility] physical therapy for low back pain.    3.  MEDICATIONS:  - I refilled the patient baclofen.  - Patient can continue gabapentin.  - Patient can continue ibuprofen as needed.        4.  INTERVENTIONS: No additional interventions were ordered.  We will await the results of his diagnostic test.    5.  WORK:  - I provided a new work note indicating that the patient should maintain his same light duty work restrictions that he needs to be able to take a 5 to 10-minute break every 3 hours of work to stretch.  - Patient also inquired about [facility] paperwork.  I recommended he reach out to his HR at his workplace.  I am happy to fill out [place] paperwork for intermittent absences due to flareups of pain.  He will send the paperwork to me.    6.  FOLLOW-UP: Patient is going to follow-up with me to review his MRI  results.  If he has questions or concerns in the meantime, he should not hesitate to call.    Subjective:     Jeromy Valdivia is a 39 year old male who presents today for follow-up regarding an acute flare of low back pain.  Patient reports that pain became more severe 3 weeks ago.  He denies any injury or event to cause the increased pain    Patient complains of bilateral back pain.  Pain is currently worse on the right than the left.  Pain is in the lower lumbar region and extends into the buttocks.  Yesterday he also had sacral pain in the midline.  Patient also reports pain in the mid thoracic region bilaterally.  He states that he feels like the muscles are very tight in his back.  He denies pain radiating from the back down the legs but he does have pain/tight muscles in both calves.  Denies numbness, tingling, weakness on the legs.  Denies loss of bowel or bladder control.  Denies fevers.  He rates his pain today as a 6 out of 10.  At its worst it is a 9 out of 10.  Out of best it is a 5 out of 10.  Pain is aggravated with standing too long, moving, sitting.  Pain is alleviated with lying down, taking a hot bath, sometimes stretching.  However, recently stretching has actually made his pain worse.  Denies loss of bowel or bladder control.  Denies fevers.        Treatment to date:  - 6 sessions physical therapy for lumbar radicular pain ending December 22, 2021.  He still does home exercises.  He also uses an inversion table.  - Bilateral L3, L4, L5 radiofrequency ablation November 24, 2023 with 50% relief of pain for 5 months  - Right L5-S1 and S1-S2 transforaminal epidural steroid injections March 17, 2023 provided 80% relief of pain for 2 months  - Bilateral L3, L4, L5 medial branch blocks October 11, 2023-positive response  - Bilateral L3, L4, L5 medial branch blocks September 27, 2023-positive spine  - Bilateral L5-S1 transforaminal epidural steroid injections June 21, 2023 with 50% relief  - Gabapentin 300 mg  as needed helpful  - Cyclobenzaprine 10 mg   - Oxycodone   - Medrol Dosepak  - Methocarbamol  - Baclofen as needed helpful.  He feels this is the most effective muscle relaxer he has tried.      Review of Systems:  Positive for headache, pain much worse at night.  Negative for weakness, loss of bowel/bladder control, inability to urinate, numbness/tingling, trip/stumble/falls, difficulty swallowing, difficulty with hand skills, fevers, unintentional weight loss.     Objective:   CONSTITUTIONAL:  Vital signs as above.  Patient is in no acute distress.  The patient is well nourished and well groomed.    PSYCHIATRIC:  The patient is awake, alert, oriented to person, place and time.  The patient is answering questions appropriately with clear speech.  Normal affect.  HEENT: Normocephalic, atraumatic.  Sclera clear.    SKIN:  Skin over the face, posterior torso, bilateral upper and lower extremities is clean, dry, intact without rashes.  VASCULAR: No significant lower extremity edema.  MUSCULOSKELETAL: Patient ambulates with a narrow-base, nonantalgic gait.  Able to ambulate on toes and heels bilaterally without difficulty.  No significant tenderness palpation bilateral lower lumbar paraspinous muscles.  Mild hypertonicity right lumbar paraspinous muscles.    The patient has 5/5 strength for the bilateral hip flexors, knee flexors/extensors, ankle dorsiflexors/plantar flexors.  NEUROLOGICAL:  No ankle clonus bilaterally.  Sensation light touch is intact bilateral lower extremities throughout.     RESULTS: I reviewed the MRI lumbar spine from [facility] dated August 19, 2023.  At L3-4 there is a right paracentral disc bulge with mild spinal canal stenosis.  At L4-5 there is a left paracentral disc bulge with mild spinal canal stenosis.  At L5-S1 there is a right paracentral disc bulge but no neural compromise.

## 2025-07-28 NOTE — LETTER
7/28/2025      Jeromy Valdivia  [address]  [place] [address]      Dear Colleague,    Thank you for referring your patient, Jeromy Valdivia, to the [facility]. Please see a copy of my visit note below.    Assessment:   Jeromy Valdivia is a 39 y.o. male who is otherwise healthy who presents today for follow-up regarding an acute flare of chronic back pain currently affecting the thoracic and lumbar regions with radiation into the buttocks.  Pain flared up about 3 weeks ago with no trauma.  My review of an MRI lumbar spine from August 2023 shows shallow disc bulges at L3-4, L4-5, and L5-S1.  There is mild spinal canal stenosis L3-4 and L4-5.  I am concerned spondylosis has progressed his pain is worsening.  Patient is neurologically intact on exam.    Plan:     A shared decision making plan was used.  The patient's values and choices were respected.  The following represents what was discussed and decided upon by the physician assistant and the patient.      1.  DIAGNOSTIC TESTS:  - I reviewed the MRI lumbar spine from August 2023.  - I ordered an updated MRI lumbar spine as well as an MRI thoracic spine for further evaluation of his worsening pain.    2.  PHYSICAL THERAPY: Patient completed 6 sessions of physical therapy for lumbar radicular pain ending December 22, 2021.  - Entered a new referral for the patient to be in [facility] for low back pain.    3.  MEDICATIONS:  - I refilled the patient baclofen.  - Patient can continue gabapentin.  - Patient can continue ibuprofen as needed.        4.  INTERVENTIONS: No additional interventions were ordered.  We will await the results of his diagnostic test.    5.  WORK:  - I provided a new work note indicating that the patient should maintain his same light duty work restrictions that he needs to be able to take a 5 to 10-minute break every 3 hours of work to stretch.  - Patient also inquired about [place] paperwork.  I recommended he reach out to  his HR at his workplace.  I am happy to fill out [facility] paperwork for intermittent absences due to flareups of pain.  He will send the paperwork to me.    6.  FOLLOW-UP: Patient is going to follow-up with me to review his MRI results.  If he has questions or concerns in the meantime, he should not hesitate to call.    Subjective:     Jeromy Valdivia is a 39 year old male who presents today for follow-up regarding an acute flare of low back pain.  Patient reports that pain became more severe 3 weeks ago.  He denies any injury or event to cause the increased pain    Patient complains of bilateral back pain.  Pain is currently worse on the right than the left.  Pain is in the lower lumbar region and extends into the buttocks.  Yesterday he also had sacral pain in the midline.  Patient also reports pain in the mid thoracic region bilaterally.  He states that he feels like the muscles are very tight in his back.  He denies pain radiating from the back down the legs but he does have pain/tight muscles in both calves.  Denies numbness, tingling, weakness on the legs.  Denies loss of bowel or bladder control.  Denies fevers.  He rates his pain today as a 6 out of 10.  At its worst it is a 9 out of 10.  Out of best it is a 5 out of 10.  Pain is aggravated with standing too long, moving, sitting.  Pain is alleviated with lying down, taking a hot bath, sometimes stretching.  However, recently stretching has actually made his pain worse.  Denies loss of bowel or bladder control.  Denies fevers.        Treatment to date:  - 6 sessions physical therapy for lumbar radicular pain ending December 22, 2021.  He still does home exercises.  He also uses an inversion table.  - Bilateral L3, L4, L5 radiofrequency ablation November 24, 2023 with 50% relief of pain for 5 months  - Right L5-S1 and S1-S2 transforaminal epidural steroid injections March 17, 2023 provided 80% relief of pain for 2 months  - Bilateral L3, L4, L5 medial branch blocks  October 11, 2023-positive response  - Bilateral L3, L4, L5 medial branch blocks September 27, 2023-positive spine  - Bilateral L5-S1 transforaminal epidural steroid injections June 21, 2023 with 50% relief  - Gabapentin 300 mg as needed helpful  - Cyclobenzaprine 10 mg   - Oxycodone   - Medrol Dosepak  - Methocarbamol  - Baclofen as needed helpful.  He feels this is the most effective muscle relaxer he has tried.      Review of Systems:  Positive for headache, pain much worse at night.  Negative for weakness, loss of bowel/bladder control, inability to urinate, numbness/tingling, trip/stumble/falls, difficulty swallowing, difficulty with hand skills, fevers, unintentional weight loss.     Objective:   CONSTITUTIONAL:  Vital signs as above.  Patient is in no acute distress.  The patient is well nourished and well groomed.    PSYCHIATRIC:  The patient is awake, alert, oriented to person, place and time.  The patient is answering questions appropriately with clear speech.  Normal affect.  HEENT: Normocephalic, atraumatic.  Sclera clear.    SKIN:  Skin over the face, posterior torso, bilateral upper and lower extremities is clean, dry, intact without rashes.  VASCULAR: No significant lower extremity edema.  MUSCULOSKELETAL: Patient ambulates with a narrow-base, nonantalgic gait.  Able to ambulate on toes and heels bilaterally without difficulty.  No significant tenderness palpation bilateral lower lumbar paraspinous muscles.  Mild hypertonicity right lumbar paraspinous muscles.    The patient has 5/5 strength for the bilateral hip flexors, knee flexors/extensors, ankle dorsiflexors/plantar flexors.  NEUROLOGICAL:  No ankle clonus bilaterally.  Sensation light touch is intact bilateral lower extremities throughout.     RESULTS: I reviewed the MRI lumbar spine from [facility] dated August 19, 2023.  At L3-4 there is a right paracentral disc bulge with mild spinal canal stenosis.  At L4-5 there is a left paracentral disc  bulge with mild spinal canal stenosis.  At L5-S1 there is a right paracentral disc bulge but no neural compromise.        Again, thank you for allowing me to participate in the care of your patient.        Sincerely,        Isabel Archer PA-C    Electronically signed

## 2025-08-04 ENCOUNTER — HOSPITAL ENCOUNTER (OUTPATIENT)
Dept: MRI IMAGING | Facility: HOSPITAL | Age: 40
Discharge: HOME | End: 2025-08-04
Attending: PHYSICIAN ASSISTANT | Admitting: PHYSICIAN ASSISTANT
Payer: COMMERCIAL

## 2025-08-04 DIAGNOSIS — M54.6 PAIN IN THORACIC SPINE: ICD-10-CM

## 2025-08-04 DIAGNOSIS — M54.50 LUMBAR SPINE PAIN: ICD-10-CM

## 2025-08-04 PROCEDURE — 72148 MRI LUMBAR SPINE W/O DYE: CPT

## 2025-08-04 PROCEDURE — 72146 MRI CHEST SPINE W/O DYE: CPT

## 2025-08-11 ENCOUNTER — NURSE TRIAGE (OUTPATIENT)
Dept: FAMILY MEDICINE | Facility: CLINIC | Age: 40
End: 2025-08-11
Payer: COMMERCIAL

## 2025-08-12 ENCOUNTER — OFFICE VISIT (OUTPATIENT)
Dept: OTOLARYNGOLOGY | Facility: CLINIC | Age: 40
End: 2025-08-12
Attending: NURSE PRACTITIONER
Payer: COMMERCIAL

## 2025-08-12 ENCOUNTER — OFFICE VISIT (OUTPATIENT)
Dept: AUDIOLOGY | Facility: CLINIC | Age: 40
End: 2025-08-12
Attending: NURSE PRACTITIONER
Payer: COMMERCIAL

## 2025-08-12 ENCOUNTER — OFFICE VISIT (OUTPATIENT)
Dept: FAMILY MEDICINE | Facility: CLINIC | Age: 40
End: 2025-08-12
Payer: COMMERCIAL

## 2025-08-12 VITALS
BODY MASS INDEX: 32.78 KG/M2 | SYSTOLIC BLOOD PRESSURE: 110 MMHG | TEMPERATURE: 98.1 F | DIASTOLIC BLOOD PRESSURE: 70 MMHG | HEART RATE: 110 BPM | HEIGHT: 64 IN | OXYGEN SATURATION: 97 % | RESPIRATION RATE: 18 BRPM | WEIGHT: 192 LBS

## 2025-08-12 DIAGNOSIS — Z71.6 ENCOUNTER FOR TOBACCO USE CESSATION COUNSELING: ICD-10-CM

## 2025-08-12 DIAGNOSIS — M10.072 ACUTE IDIOPATHIC GOUT INVOLVING TOE OF LEFT FOOT: ICD-10-CM

## 2025-08-12 DIAGNOSIS — H93.13 TINNITUS, BILATERAL: ICD-10-CM

## 2025-08-12 DIAGNOSIS — F17.200 TOBACCO DEPENDENCE SYNDROME: ICD-10-CM

## 2025-08-12 DIAGNOSIS — M51.26 LUMBAR DISC HERNIATION: ICD-10-CM

## 2025-08-12 DIAGNOSIS — H93.13 TINNITUS, BILATERAL: Primary | ICD-10-CM

## 2025-08-12 DIAGNOSIS — H90.3 SENSORINEURAL HEARING LOSS (SNHL) OF BOTH EARS: Primary | ICD-10-CM

## 2025-08-12 DIAGNOSIS — M54.50 ACUTE MIDLINE LOW BACK PAIN WITHOUT SCIATICA: Primary | ICD-10-CM

## 2025-08-12 DIAGNOSIS — H90.3 BILATERAL HIGH FREQUENCY SENSORINEURAL HEARING LOSS: ICD-10-CM

## 2025-08-12 PROCEDURE — 3078F DIAST BP <80 MM HG: CPT | Performed by: PHYSICIAN ASSISTANT

## 2025-08-12 PROCEDURE — 92550 TYMPANOMETRY & REFLEX THRESH: CPT | Mod: 52 | Performed by: AUDIOLOGIST

## 2025-08-12 PROCEDURE — 99203 OFFICE O/P NEW LOW 30 MIN: CPT | Performed by: OTOLARYNGOLOGY

## 2025-08-12 PROCEDURE — G2211 COMPLEX E/M VISIT ADD ON: HCPCS | Performed by: PHYSICIAN ASSISTANT

## 2025-08-12 PROCEDURE — 99214 OFFICE O/P EST MOD 30 MIN: CPT | Performed by: PHYSICIAN ASSISTANT

## 2025-08-12 PROCEDURE — 92557 COMPREHENSIVE HEARING TEST: CPT | Performed by: AUDIOLOGIST

## 2025-08-12 PROCEDURE — 3074F SYST BP LT 130 MM HG: CPT | Performed by: PHYSICIAN ASSISTANT

## 2025-08-12 RX ORDER — METHYLPREDNISOLONE 4 MG/1
TABLET ORAL
Qty: 21 TABLET | Refills: 0 | Status: SHIPPED | OUTPATIENT
Start: 2025-08-12

## 2025-08-13 ENCOUNTER — PATIENT OUTREACH (OUTPATIENT)
Dept: CARE COORDINATION | Facility: CLINIC | Age: 40
End: 2025-08-13
Payer: COMMERCIAL

## 2025-08-21 ENCOUNTER — OFFICE VISIT (OUTPATIENT)
Dept: PHYSICAL MEDICINE AND REHAB | Facility: CLINIC | Age: 40
End: 2025-08-21
Payer: COMMERCIAL

## 2025-08-21 VITALS
HEIGHT: 64 IN | WEIGHT: 192 LBS | DIASTOLIC BLOOD PRESSURE: 80 MMHG | HEART RATE: 99 BPM | BODY MASS INDEX: 32.78 KG/M2 | SYSTOLIC BLOOD PRESSURE: 120 MMHG

## 2025-08-21 DIAGNOSIS — M54.6 PAIN IN THORACIC SPINE: ICD-10-CM

## 2025-08-21 DIAGNOSIS — M62.830 BACK MUSCLE SPASM: Primary | ICD-10-CM

## 2025-08-21 DIAGNOSIS — M54.50 LUMBAR SPINE PAIN: ICD-10-CM

## 2025-08-21 ASSESSMENT — PAIN SCALES - GENERAL: PAINLEVEL_OUTOF10: MILD PAIN (3)

## 2025-09-03 ENCOUNTER — THERAPY VISIT (OUTPATIENT)
Dept: PHYSICAL THERAPY | Facility: CLINIC | Age: 40
End: 2025-09-03
Payer: COMMERCIAL

## 2025-09-03 DIAGNOSIS — M54.50 LUMBAR SPINE PAIN: ICD-10-CM

## 2025-09-03 DIAGNOSIS — M54.6 PAIN IN THORACIC SPINE: ICD-10-CM

## 2025-09-03 DIAGNOSIS — M79.18 MYOFASCIAL PAIN: Primary | ICD-10-CM

## 2025-09-03 PROCEDURE — 97110 THERAPEUTIC EXERCISES: CPT | Mod: GP | Performed by: PHYSICAL THERAPIST
